# Patient Record
Sex: FEMALE | Race: WHITE | NOT HISPANIC OR LATINO | Employment: UNEMPLOYED | URBAN - METROPOLITAN AREA
[De-identification: names, ages, dates, MRNs, and addresses within clinical notes are randomized per-mention and may not be internally consistent; named-entity substitution may affect disease eponyms.]

---

## 2018-04-06 ENCOUNTER — HISTORICAL (OUTPATIENT)
Dept: ADMINISTRATIVE | Facility: HOSPITAL | Age: 23
End: 2018-04-06

## 2018-04-06 LAB
ALBUMIN SERPL-MCNC: 3.4 GM/DL (ref 3.4–5)
ALBUMIN/GLOB SERPL: 1 RATIO (ref 1–2)
ALP SERPL-CCNC: 128 UNIT/L (ref 45–117)
ALT SERPL-CCNC: 20 UNIT/L (ref 12–78)
APPEARANCE, UA: CLEAR
AST SERPL-CCNC: 16 UNIT/L (ref 15–37)
BACTERIA #/AREA URNS AUTO: ABNORMAL /[HPF]
BILIRUB SERPL-MCNC: 0.6 MG/DL (ref 0.2–1)
BILIRUB UR QL STRIP: NEGATIVE
BILIRUBIN DIRECT+TOT PNL SERPL-MCNC: 0.2 MG/DL
BILIRUBIN DIRECT+TOT PNL SERPL-MCNC: 0.4 MG/DL
BUN SERPL-MCNC: 7 MG/DL (ref 7–18)
CALCIUM SERPL-MCNC: 8.6 MG/DL (ref 8.5–10.1)
CHLORIDE SERPL-SCNC: 109 MMOL/L (ref 98–107)
CO2 SERPL-SCNC: 25 MMOL/L (ref 21–32)
COLOR UR: YELLOW
CREAT SERPL-MCNC: 0.9 MG/DL (ref 0.6–1.3)
DEPRECATED CALCIDIOL+CALCIFEROL SERPL-MC: 27.38 NG/ML (ref 30–80)
EST. AVERAGE GLUCOSE BLD GHB EST-MCNC: 103 MG/DL
GLOBULIN SER-MCNC: 4.4 GM/ML (ref 2.3–3.5)
GLUCOSE (UA): NORMAL
GLUCOSE SERPL-MCNC: 93 MG/DL (ref 74–106)
HAV IGM SERPL QL IA: NONREACTIVE
HBA1C MFR BLD: 5.2 % (ref 4.2–6.3)
HBV CORE IGM SERPL QL IA: ABNORMAL
HBV SURFACE AG SERPL QL IA: NEGATIVE
HCV AB SERPL QL IA: NONREACTIVE
HGB UR QL STRIP: NEGATIVE
HIV 1+2 AB+HIV1 P24 AG SERPL QL IA: NONREACTIVE
HYALINE CASTS #/AREA URNS LPF: ABNORMAL /[LPF]
KETONES UR QL STRIP: NEGATIVE
LEUKOCYTE ESTERASE UR QL STRIP: 25 LEU/UL
NITRITE UR QL STRIP: NEGATIVE
PH UR STRIP: 7 [PH] (ref 4.5–8)
POTASSIUM SERPL-SCNC: 4.1 MMOL/L (ref 3.5–5.1)
PROT SERPL-MCNC: 7.8 GM/DL (ref 6.4–8.2)
PROT UR QL STRIP: NEGATIVE
RBC #/AREA URNS AUTO: ABNORMAL /[HPF]
SODIUM SERPL-SCNC: 143 MMOL/L (ref 136–145)
SP GR UR STRIP: 1.01 (ref 1–1.03)
SQUAMOUS #/AREA URNS LPF: ABNORMAL /[LPF]
TSH SERPL-ACNC: 0.96 MIU/L (ref 0.36–3.74)
UROBILINOGEN UR STRIP-ACNC: NORMAL
WBC #/AREA URNS AUTO: ABNORMAL /HPF

## 2018-06-06 ENCOUNTER — HISTORICAL (OUTPATIENT)
Dept: ADMINISTRATIVE | Facility: HOSPITAL | Age: 23
End: 2018-06-06

## 2018-06-06 LAB
HBV CORE IGM SERPL QL IA: ABNORMAL
HBV SURFACE AB SER-ACNC: <3.1 MIU/ML
HBV SURFACE AB SERPL IA-ACNC: NONREACTIVE M[IU]/ML

## 2018-08-09 ENCOUNTER — HISTORICAL (OUTPATIENT)
Dept: INTERNAL MEDICINE | Facility: CLINIC | Age: 23
End: 2018-08-09

## 2018-08-09 LAB
ABS NEUT (OLG): 5.52 X10(3)/MCL (ref 2.1–9.2)
BASOPHILS # BLD AUTO: 0.08 X10(3)/MCL
BASOPHILS NFR BLD AUTO: 1 %
EOSINOPHIL # BLD AUTO: 0.26 X10(3)/MCL
EOSINOPHIL NFR BLD AUTO: 3 %
ERYTHROCYTE [DISTWIDTH] IN BLOOD BY AUTOMATED COUNT: 13.2 % (ref 11.5–14.5)
HCT VFR BLD AUTO: 46.1 % (ref 35–46)
HGB BLD-MCNC: 15.4 GM/DL (ref 12–16)
IMM GRANULOCYTES # BLD AUTO: 0.02 10*3/UL
IMM GRANULOCYTES NFR BLD AUTO: 0 %
LYMPHOCYTES # BLD AUTO: 1.8 X10(3)/MCL
LYMPHOCYTES NFR BLD AUTO: 22 % (ref 13–40)
MCH RBC QN AUTO: 28.5 PG (ref 26–34)
MCHC RBC AUTO-ENTMCNC: 33.4 GM/DL (ref 31–37)
MCV RBC AUTO: 85.4 FL (ref 80–100)
MONOCYTES # BLD AUTO: 0.46 X10(3)/MCL
MONOCYTES NFR BLD AUTO: 6 % (ref 4–12)
NEUTROPHILS # BLD AUTO: 5.52 X10(3)/MCL
NEUTROPHILS NFR BLD AUTO: 68 X10(3)/MCL
PLATELET # BLD AUTO: 382 X10(3)/MCL (ref 130–400)
PMV BLD AUTO: 10 FL (ref 7.4–10.4)
RBC # BLD AUTO: 5.4 X10(6)/MCL (ref 4–5.2)
TSH SERPL-ACNC: 1.61 MIU/L (ref 0.36–3.74)
WBC # SPEC AUTO: 8.1 X10(3)/MCL (ref 4.5–11)

## 2018-11-06 ENCOUNTER — HISTORICAL (OUTPATIENT)
Dept: RADIOLOGY | Facility: HOSPITAL | Age: 23
End: 2018-11-06

## 2019-05-24 ENCOUNTER — HISTORICAL (OUTPATIENT)
Dept: INTERNAL MEDICINE | Facility: CLINIC | Age: 24
End: 2019-05-24

## 2019-05-24 LAB
ABS NEUT (OLG): 6.34 X10(3)/MCL (ref 2.1–9.2)
ALBUMIN SERPL-MCNC: 3.4 GM/DL (ref 3.4–5)
ALBUMIN/GLOB SERPL: 0.8 RATIO (ref 1.1–2)
ALP SERPL-CCNC: 135 UNIT/L (ref 45–117)
ALT SERPL-CCNC: 25 UNIT/L (ref 12–78)
AST SERPL-CCNC: 20 UNIT/L (ref 15–37)
BASOPHILS # BLD AUTO: 0.07 X10(3)/MCL
BASOPHILS NFR BLD AUTO: 1 %
BILIRUB SERPL-MCNC: 0.4 MG/DL (ref 0.2–1)
BILIRUBIN DIRECT+TOT PNL SERPL-MCNC: 0.1 MG/DL
BILIRUBIN DIRECT+TOT PNL SERPL-MCNC: 0.3 MG/DL
BUN SERPL-MCNC: 10 MG/DL (ref 7–18)
CALCIUM SERPL-MCNC: 9.2 MG/DL (ref 8.5–10.1)
CHLORIDE SERPL-SCNC: 105 MMOL/L (ref 98–107)
CO2 SERPL-SCNC: 22 MMOL/L (ref 21–32)
CREAT SERPL-MCNC: 1 MG/DL (ref 0.6–1.3)
EOSINOPHIL # BLD AUTO: 0.3 X10(3)/MCL
EOSINOPHIL NFR BLD AUTO: 3 %
ERYTHROCYTE [DISTWIDTH] IN BLOOD BY AUTOMATED COUNT: 13.3 % (ref 11.5–14.5)
GLOBULIN SER-MCNC: 4.4 GM/ML (ref 2.3–3.5)
GLUCOSE SERPL-MCNC: 102 MG/DL (ref 74–106)
HCT VFR BLD AUTO: 43.3 % (ref 35–46)
HGB BLD-MCNC: 14.3 GM/DL (ref 12–16)
IMM GRANULOCYTES # BLD AUTO: 0.04 10*3/UL
IMM GRANULOCYTES NFR BLD AUTO: 0 %
LYMPHOCYTES # BLD AUTO: 2.43 X10(3)/MCL
LYMPHOCYTES NFR BLD AUTO: 25 % (ref 13–40)
MCH RBC QN AUTO: 27.7 PG (ref 26–34)
MCHC RBC AUTO-ENTMCNC: 33 GM/DL (ref 31–37)
MCV RBC AUTO: 83.9 FL (ref 80–100)
MONOCYTES # BLD AUTO: 0.55 X10(3)/MCL
MONOCYTES NFR BLD AUTO: 6 % (ref 4–12)
NEUTROPHILS # BLD AUTO: 6.34 X10(3)/MCL
NEUTROPHILS NFR BLD AUTO: 65 X10(3)/MCL
PLATELET # BLD AUTO: 387 X10(3)/MCL (ref 130–400)
PMV BLD AUTO: 9.9 FL (ref 7.4–10.4)
POTASSIUM SERPL-SCNC: 4 MMOL/L (ref 3.5–5.1)
PROT SERPL-MCNC: 7.8 GM/DL (ref 6.4–8.2)
RBC # BLD AUTO: 5.16 X10(6)/MCL (ref 4–5.2)
SODIUM SERPL-SCNC: 136 MMOL/L (ref 136–145)
WBC # SPEC AUTO: 9.7 X10(3)/MCL (ref 4.5–11)

## 2020-09-22 ENCOUNTER — HISTORICAL (OUTPATIENT)
Dept: INTERNAL MEDICINE | Facility: CLINIC | Age: 25
End: 2020-09-22

## 2020-09-22 LAB
ABS NEUT (OLG): 6.16 X10(3)/MCL (ref 2.1–9.2)
ALBUMIN SERPL-MCNC: 3.9 GM/DL (ref 3.4–5)
ALBUMIN/GLOB SERPL: 0.9 RATIO (ref 1.1–2)
ALP SERPL-CCNC: 114 UNIT/L (ref 45–117)
ALT SERPL-CCNC: 16 UNIT/L (ref 12–78)
AST SERPL-CCNC: 18 UNIT/L (ref 15–37)
BASOPHILS # BLD AUTO: 0.1 X10(3)/MCL (ref 0–0.2)
BASOPHILS NFR BLD AUTO: 1 %
BILIRUB SERPL-MCNC: 0.5 MG/DL (ref 0.2–1)
BILIRUBIN DIRECT+TOT PNL SERPL-MCNC: 0.2 MG/DL (ref 0–0.2)
BILIRUBIN DIRECT+TOT PNL SERPL-MCNC: 0.3 MG/DL
BUN SERPL-MCNC: 9 MG/DL (ref 7–18)
CALCIUM SERPL-MCNC: 9 MG/DL (ref 8.5–10.1)
CHLORIDE SERPL-SCNC: 107 MMOL/L (ref 98–107)
CHOLEST SERPL-MCNC: 213 MG/DL
CHOLEST/HDLC SERPL: 5.5 {RATIO} (ref 0–4.4)
CO2 SERPL-SCNC: 18 MMOL/L (ref 21–32)
CREAT SERPL-MCNC: 1 MG/DL (ref 0.6–1.3)
DEPRECATED CALCIDIOL+CALCIFEROL SERPL-MC: 50.9 NG/ML (ref 30–80)
EOSINOPHIL # BLD AUTO: 0.2 X10(3)/MCL (ref 0–0.9)
EOSINOPHIL NFR BLD AUTO: 2 %
ERYTHROCYTE [DISTWIDTH] IN BLOOD BY AUTOMATED COUNT: 14.5 % (ref 11.5–14.5)
EST. AVERAGE GLUCOSE BLD GHB EST-MCNC: 108 MG/DL
GLOBULIN SER-MCNC: 4.5 GM/ML (ref 2.3–3.5)
GLUCOSE SERPL-MCNC: 94 MG/DL (ref 74–106)
HAV IGM SERPL QL IA: NONREACTIVE
HBA1C MFR BLD: 5.4 % (ref 4.2–6.3)
HBV CORE IGM SERPL QL IA: NONREACTIVE
HBV SURFACE AG SERPL QL IA: NONREACTIVE
HCT VFR BLD AUTO: 44.5 % (ref 35–46)
HCV AB SERPL QL IA: NONREACTIVE
HDLC SERPL-MCNC: 39 MG/DL (ref 40–59)
HGB BLD-MCNC: 14.6 GM/DL (ref 12–16)
HIV 1+2 AB+HIV1 P24 AG SERPL QL IA: NONREACTIVE
IMM GRANULOCYTES # BLD AUTO: 0.03 10*3/UL
IMM GRANULOCYTES NFR BLD AUTO: 0 %
LDLC SERPL CALC-MCNC: 134 MG/DL
LYMPHOCYTES # BLD AUTO: 2.2 X10(3)/MCL (ref 0.6–4.6)
LYMPHOCYTES NFR BLD AUTO: 24 %
MCH RBC QN AUTO: 27 PG (ref 26–34)
MCHC RBC AUTO-ENTMCNC: 32.8 GM/DL (ref 31–37)
MCV RBC AUTO: 82.4 FL (ref 80–100)
MONOCYTES # BLD AUTO: 0.5 X10(3)/MCL (ref 0.1–1.3)
MONOCYTES NFR BLD AUTO: 6 %
NEUTROPHILS # BLD AUTO: 6.16 X10(3)/MCL (ref 2.1–9.2)
NEUTROPHILS NFR BLD AUTO: 68 %
PLATELET # BLD AUTO: 467 X10(3)/MCL (ref 130–400)
PMV BLD AUTO: 9.6 FL (ref 7.4–10.4)
POTASSIUM SERPL-SCNC: 4.3 MMOL/L (ref 3.5–5.1)
PROT SERPL-MCNC: 8.4 GM/DL (ref 6.4–8.2)
RBC # BLD AUTO: 5.4 X10(6)/MCL (ref 4–5.2)
SODIUM SERPL-SCNC: 137 MMOL/L (ref 136–145)
T PALLIDUM AB SER QL: NONREACTIVE
TRIGL SERPL-MCNC: 202 MG/DL
TSH SERPL-ACNC: 2.19 MIU/L (ref 0.36–3.74)
VLDLC SERPL CALC-MCNC: 40 MG/DL
WBC # SPEC AUTO: 9.1 X10(3)/MCL (ref 4.5–11)

## 2021-06-30 LAB
CHLAMYDIA TRACHOMATIS (PRECISION): NEGATIVE
NEISSERIA, GONORRHOAEA DNA, SDA: NEGATIVE
PAP RECOMMENDATION EXT: NORMAL
PAP SMEAR: NORMAL
TRICHOMONAS VAGINALIS: NEGATIVE

## 2021-10-20 ENCOUNTER — HISTORICAL (OUTPATIENT)
Dept: INTERNAL MEDICINE | Facility: CLINIC | Age: 26
End: 2021-10-20

## 2021-10-20 LAB
ABS NEUT (OLG): 4.93 X10(3)/MCL (ref 2.1–9.2)
ALBUMIN SERPL-MCNC: 3.5 GM/DL (ref 3.5–5)
ALBUMIN/GLOB SERPL: 1.1 RATIO (ref 1.1–2)
ALP SERPL-CCNC: 92 UNIT/L (ref 40–150)
ALT SERPL-CCNC: 9 UNIT/L (ref 0–55)
AST SERPL-CCNC: 15 UNIT/L (ref 5–34)
BASOPHILS # BLD AUTO: 0.1 X10(3)/MCL (ref 0–0.2)
BASOPHILS NFR BLD AUTO: 1 %
BILIRUB SERPL-MCNC: 0.2 MG/DL
BILIRUBIN DIRECT+TOT PNL SERPL-MCNC: 0.1 MG/DL (ref 0–0.5)
BILIRUBIN DIRECT+TOT PNL SERPL-MCNC: 0.1 MG/DL (ref 0–0.8)
BUN SERPL-MCNC: 9.7 MG/DL (ref 7–18.7)
CALCIUM SERPL-MCNC: 9.7 MG/DL (ref 8.4–10.2)
CHLORIDE SERPL-SCNC: 108 MMOL/L (ref 98–107)
CHOLEST SERPL-MCNC: 181 MG/DL
CHOLEST/HDLC SERPL: 6 {RATIO} (ref 0–5)
CO2 SERPL-SCNC: 20 MMOL/L (ref 22–29)
CREAT SERPL-MCNC: 0.99 MG/DL (ref 0.55–1.02)
DEPRECATED CALCIDIOL+CALCIFEROL SERPL-MC: 37.1 NG/ML (ref 30–80)
EOSINOPHIL # BLD AUTO: 0.2 X10(3)/MCL (ref 0–0.9)
EOSINOPHIL NFR BLD AUTO: 2 %
ERYTHROCYTE [DISTWIDTH] IN BLOOD BY AUTOMATED COUNT: 14 % (ref 11.5–14.5)
EST. AVERAGE GLUCOSE BLD GHB EST-MCNC: 99.7 MG/DL
GLOBULIN SER-MCNC: 3.3 GM/DL (ref 2.4–3.5)
GLUCOSE SERPL-MCNC: 99 MG/DL (ref 74–100)
HAV IGM SERPL QL IA: NONREACTIVE
HBA1C MFR BLD: 5.1 %
HBV CORE IGM SERPL QL IA: NONREACTIVE
HBV SURFACE AG SERPL QL IA: NONREACTIVE
HCT VFR BLD AUTO: 41.4 % (ref 35–46)
HCV AB SERPL QL IA: NONREACTIVE
HDLC SERPL-MCNC: 29 MG/DL (ref 35–60)
HGB BLD-MCNC: 14.1 GM/DL (ref 12–16)
HIV 1+2 AB+HIV1 P24 AG SERPL QL IA: NONREACTIVE
IMM GRANULOCYTES # BLD AUTO: 0.03 10*3/UL
IMM GRANULOCYTES NFR BLD AUTO: 0 %
LDLC SERPL CALC-MCNC: 82 MG/DL (ref 50–140)
LYMPHOCYTES # BLD AUTO: 3.3 X10(3)/MCL (ref 0.6–4.6)
LYMPHOCYTES NFR BLD AUTO: 36 %
MCH RBC QN AUTO: 27.3 PG (ref 26–34)
MCHC RBC AUTO-ENTMCNC: 34.1 GM/DL (ref 31–37)
MCV RBC AUTO: 80.2 FL (ref 80–100)
MONOCYTES # BLD AUTO: 0.6 X10(3)/MCL (ref 0.1–1.3)
MONOCYTES NFR BLD AUTO: 6 %
NEUTROPHILS # BLD AUTO: 4.93 X10(3)/MCL (ref 2.1–9.2)
NEUTROPHILS NFR BLD AUTO: 54 %
NRBC BLD AUTO-RTO: 0 % (ref 0–0.2)
PLATELET # BLD AUTO: 339 X10(3)/MCL (ref 130–400)
PMV BLD AUTO: 9.2 FL (ref 7.4–10.4)
POTASSIUM SERPL-SCNC: 3.6 MMOL/L (ref 3.5–5.1)
PROT SERPL-MCNC: 6.8 GM/DL (ref 6.4–8.3)
RBC # BLD AUTO: 5.16 X10(6)/MCL (ref 4–5.2)
SODIUM SERPL-SCNC: 136 MMOL/L (ref 136–145)
T PALLIDUM AB SER QL: NONREACTIVE
T4 FREE SERPL-MCNC: 0.78 NG/DL (ref 0.7–1.48)
TRIGL SERPL-MCNC: 351 MG/DL (ref 37–140)
TSH SERPL-ACNC: 1.31 UIU/ML (ref 0.35–4.94)
VLDLC SERPL CALC-MCNC: 70 MG/DL
WBC # SPEC AUTO: 9.1 X10(3)/MCL (ref 4.5–11)

## 2021-12-13 ENCOUNTER — HISTORICAL (OUTPATIENT)
Dept: ADMINISTRATIVE | Facility: HOSPITAL | Age: 26
End: 2021-12-13

## 2021-12-13 LAB
AMPHET UR QL SCN: NEGATIVE
BARBITURATE SCN PRESENT UR: NEGATIVE
BENZODIAZ UR QL SCN: NEGATIVE
CANNABINOIDS UR QL SCN: POSITIVE
COCAINE UR QL SCN: NEGATIVE
FENTANYL UR QL SCN: NEGATIVE
MDMA UR QL SCN: NEGATIVE
OPIATES UR QL SCN: NEGATIVE
PCP UR QL: NEGATIVE
PH UR STRIP.AUTO: 7.5 [PH] (ref 3–11)

## 2021-12-27 ENCOUNTER — HISTORICAL (OUTPATIENT)
Dept: RADIOLOGY | Facility: HOSPITAL | Age: 26
End: 2021-12-27

## 2022-01-19 ENCOUNTER — HISTORICAL (OUTPATIENT)
Dept: CARDIOLOGY | Facility: HOSPITAL | Age: 27
End: 2022-01-19

## 2022-02-10 ENCOUNTER — HISTORICAL (OUTPATIENT)
Dept: RADIOLOGY | Facility: HOSPITAL | Age: 27
End: 2022-02-10

## 2022-02-10 ENCOUNTER — HISTORICAL (OUTPATIENT)
Dept: ADMINISTRATIVE | Facility: HOSPITAL | Age: 27
End: 2022-02-10

## 2022-03-15 ENCOUNTER — HISTORICAL (OUTPATIENT)
Dept: RADIOLOGY | Facility: HOSPITAL | Age: 27
End: 2022-03-15

## 2022-03-15 ENCOUNTER — HISTORICAL (OUTPATIENT)
Dept: ADMINISTRATIVE | Facility: HOSPITAL | Age: 27
End: 2022-03-15

## 2022-04-11 ENCOUNTER — HISTORICAL (OUTPATIENT)
Dept: ADMINISTRATIVE | Facility: HOSPITAL | Age: 27
End: 2022-04-11
Payer: MEDICAID

## 2022-04-24 VITALS
WEIGHT: 245.38 LBS | OXYGEN SATURATION: 98 % | SYSTOLIC BLOOD PRESSURE: 117 MMHG | BODY MASS INDEX: 39.43 KG/M2 | DIASTOLIC BLOOD PRESSURE: 81 MMHG | HEIGHT: 66 IN

## 2022-06-09 PROBLEM — F41.1 ANXIETY STATE: Status: ACTIVE | Noted: 2022-06-09

## 2022-06-09 PROBLEM — E66.9 OBESITY: Status: ACTIVE | Noted: 2022-06-09

## 2022-06-09 PROBLEM — G43.909 MIGRAINE HEADACHE: Status: ACTIVE | Noted: 2022-06-09

## 2022-06-09 PROBLEM — G47.00 INSOMNIA: Status: ACTIVE | Noted: 2022-06-09

## 2022-06-09 PROBLEM — F32.A DEPRESSIVE DISORDER: Status: ACTIVE | Noted: 2022-06-09

## 2022-06-09 PROBLEM — H93.19 TINNITUS: Status: ACTIVE | Noted: 2022-06-09

## 2022-06-09 PROBLEM — R61 HYPERHIDROSIS: Status: ACTIVE | Noted: 2022-06-09

## 2022-06-09 PROBLEM — E55.9 VITAMIN D DEFICIENCY: Status: ACTIVE | Noted: 2022-06-09

## 2022-06-09 NOTE — PROGRESS NOTES
[facility], formerly [facility] Neurology follow-up Office Visit Note    Initial Visit Date:  March 8, 2022  Last Visit Date:  March 8, 2022  Current Visit Date:  06/10/2022    Chief Complaint:     Chief Complaint   Patient presents with    Migraine     States headaches are getting worst       History of Present Illness:      This is 27 y.o. female with history of Anxiety, Depression, Hyperhidrosis, Eating disorder, substance abuse, Tachycardia who is referred for dizziness, tachycardia, and worsening headache disorder due to polypharmacy. Also has over sleeping issue, which leads to worsening daytime fatigue.  During last visit, patient was advised that the concurrent use of prazosin, trazodone, Klonopin, hydroxyzine, glycopyrrolate, meclizine, Effexor can lead to tachycardia, dizziness, orthostatic hypotension, xerostomia, hyperhidrosis, and nightmares.  She had since then attempted to wean herself off of Effexor despite being told to discuss with her psychiatrist regarding medication adjustments.  She had since then been seen at the emergency room for SNRI withdrawal.  Also notes that she is taking cannabis on top of all her medications. Have just Trintellix.    Headache Frequency: 8 migraine headache days per month with daily headache.     Presenting History   Of note, patient was started on Glycopyrrolate in 11/2021 while on Hydroxyzine, Prazosin, Trazodone, Klonopin, Effexor. She noted that around 12/2021, she had one episode whereby she felt dizzy, palpitation, lightheaded, followed by LOC. She noted that since then, she would experience out of body sensation, dizziness, and palpitations during activities. She also noted worsening headaches for the past few months. She reports chronic trouble staying asleep and waking up tired. She would go to bed at around 9 - 10 pm and wake up at around 8 AM in the morning. She also notes hyperhydrosis that has been consistently getting worse in recent years, requiring her to start  glyocopyrrolate.      Medications:     Current Outpatient Medications on File Prior to Visit   Medication Sig Dispense Refill    clonazePAM (KLONOPIN) 0.5 MG tablet Take 0.5 mg by mouth 2 (two) times daily.      fexofenadine-pseudoephedrine (ALLEGRA-D 24) 180-240 mg per 24 hr tablet fexofenadine-pseudoephedrine  mg-240 mg tablet,ext.release 24 hr   Take 1 tablet every day by oral route.      L norgest/e.estradioL-e.estrad (SEASONIQUE) 0.15 mg-30 mcg (84)/10 mcg (7) 3MPk Ashlyna 0.15 mg-30 mcg (84)/10 mcg(7) tablets,3 month dose pack   Take 1 tablet every day by oral route.      OXcarbazepine (TRILEPTAL) 300 MG Tab Take 300 mg by mouth 2 (two) times daily.      pantoprazole (PROTONIX) 40 MG tablet pantoprazole 40 mg tablet,delayed release      traZODone (DESYREL) 50 MG tablet Take  mg by mouth nightly as needed.      glycopyrrolate (ROBINUL) 1 mg Tab Take 1 mg by mouth once daily.       No current facility-administered medications on file prior to visit.       Labs:     Results for orders placed or performed in visit on 12/13/21   Drug Screen, Urine   Result Value Ref Range    pH, UA 7.5 3.0 - 11.0    Fentanyl, Urine Negative >Negative    Cocaine, Urine Negative >Negative    Cannabinoids, Urine Positive (A) >Negative    Barbituates, Urine Negative >Negative    Amphetamines, Urine Negative >Negative    Benzodiazepine, Urine Negative >Negative    Opiates, Urine Negative >Negative    Phencyclidine, Urine Negative >Negative    MDMA, Urine Negative >Negative       Studies:       - routine EEG 1/19/2022: This is a normal routine awake EEG.  - Holter 24 hrs - 42% HR in 100s with premature beats.    Review of Systems:     Review of Systems   All other systems reviewed and are negative.      Physical Exams:     Vitals:    06/10/22 0940   BP: 110/77   Pulse: 63   Resp: 18   Temp: 99 °F (37.2 °C)       Physical Exam  Vitals and nursing note reviewed.   Constitutional:       Appearance: Normal appearance.    HENT:      Head: Normocephalic and atraumatic.      Nose: Nose normal.      Mouth/Throat:      Mouth: Mucous membranes are moist.      Pharynx: Oropharynx is clear.   Eyes:      Conjunctiva/sclera: Conjunctivae normal.   Cardiovascular:      Rate and Rhythm: Normal rate and regular rhythm.      Pulses: Normal pulses.   Pulmonary:      Effort: Pulmonary effort is normal.      Breath sounds: Normal breath sounds.   Abdominal:      General: Abdomen is flat.   Musculoskeletal:         General: Normal range of motion.      Cervical back: Normal range of motion.   Skin:     General: Skin is warm.   Neurological:      Mental Status: She is alert.         Comprehensive Neurological Exam:  Mental Status: Alert Oriented to Self, Date, and Place. Naming, repetition, reading, and writing wnl. Comprehension wnl. No dysarthria.   CN II - XII: EMMA, No APD, Fundus wnl OU, VA grossly intact to finger counting at 6 ft, VFFC, No ptosis OU, EOMI without nystagmus, LT/Temp symmetric in CN V1-3 distribution, Hearing grossly intact, Face Symmetric, Tongue and Uvula midline, Trapezius symmetric bilateral.   Motor: tone and bulk wnl throughout, no abnormal involuntary or voluntary movements, 5/5 to confrontation, Fine finger movements wnl b/l, No pronator drift.   Sensory: LT, Proprioception, Vibration, PP, Temp symmetric. No sensory simultagnosia.   Reflexes: 2+ throughout, plantar reflexes downward bilateral.   Cerebellar: FNF wnl b/l, RAHM wnl b/l  Romberg: Negative  Modified Tandem Romberg: wnl  Gait: normal.      Assessment:     This is 27 y.o. female with history of Anxiety, Depression, Hyperhidrosis, Eating disorder, substance abuse, Tachycardia who is referred for dizziness, tachycardia, and worsening headache disorder due to polypharmacy. Also has over sleeping issue, which leads to worsening daytime fatigue.     Problem List Items Addressed This Visit        Neuro    Migraine headache - Primary       Other    Hyperhidrosis       Other Visit Diagnoses     Polypharmacy              Plan:     [] start Propranolol 20 mg BID   [] start Promethazine 25 mg BID PRN   [] start Maxalt 5 mg BID PRN     RTC 3 months     I have explained the treatment plan, diagnosis, and prognosis to patient. All questions are answered to the best of my knowledge.     Face to face time 30 minutes, including documentation, chart review, counseling, education, review of test results, relevant medical records, and coordination of care.     I have explained the treatment plan, diagnosis, and prognosis to patient. All questions are answered to the best of my knowledge.       Daly Colon MD   General Neurology  06/10/2022

## 2022-06-10 ENCOUNTER — OFFICE VISIT (OUTPATIENT)
Dept: NEUROLOGY | Facility: CLINIC | Age: 27
End: 2022-06-10
Payer: MEDICAID

## 2022-06-10 VITALS
OXYGEN SATURATION: 98 % | SYSTOLIC BLOOD PRESSURE: 110 MMHG | HEIGHT: 66 IN | TEMPERATURE: 99 F | HEART RATE: 63 BPM | RESPIRATION RATE: 18 BRPM | BODY MASS INDEX: 38.72 KG/M2 | WEIGHT: 240.94 LBS | DIASTOLIC BLOOD PRESSURE: 77 MMHG

## 2022-06-10 DIAGNOSIS — R61 HYPERHIDROSIS: ICD-10-CM

## 2022-06-10 DIAGNOSIS — F41.1 ANXIETY STATE: ICD-10-CM

## 2022-06-10 DIAGNOSIS — Z79.899 POLYPHARMACY: ICD-10-CM

## 2022-06-10 DIAGNOSIS — F32.A DEPRESSIVE DISORDER: ICD-10-CM

## 2022-06-10 DIAGNOSIS — G43.709 CHRONIC MIGRAINE WITHOUT AURA WITHOUT STATUS MIGRAINOSUS, NOT INTRACTABLE: Primary | ICD-10-CM

## 2022-06-10 PROCEDURE — 1159F PR MEDICATION LIST DOCUMENTED IN MEDICAL RECORD: ICD-10-PCS | Mod: CPTII,,, | Performed by: PSYCHIATRY & NEUROLOGY

## 2022-06-10 PROCEDURE — 3078F DIAST BP <80 MM HG: CPT | Mod: CPTII,,, | Performed by: PSYCHIATRY & NEUROLOGY

## 2022-06-10 PROCEDURE — 99214 OFFICE O/P EST MOD 30 MIN: CPT | Mod: S$PBB,,, | Performed by: PSYCHIATRY & NEUROLOGY

## 2022-06-10 PROCEDURE — 3008F PR BODY MASS INDEX (BMI) DOCUMENTED: ICD-10-PCS | Mod: CPTII,,, | Performed by: PSYCHIATRY & NEUROLOGY

## 2022-06-10 PROCEDURE — 1159F MED LIST DOCD IN RCRD: CPT | Mod: CPTII,,, | Performed by: PSYCHIATRY & NEUROLOGY

## 2022-06-10 PROCEDURE — 3008F BODY MASS INDEX DOCD: CPT | Mod: CPTII,,, | Performed by: PSYCHIATRY & NEUROLOGY

## 2022-06-10 PROCEDURE — 99213 OFFICE O/P EST LOW 20 MIN: CPT | Mod: PBBFAC | Performed by: PSYCHIATRY & NEUROLOGY

## 2022-06-10 PROCEDURE — 99214 PR OFFICE/OUTPT VISIT, EST, LEVL IV, 30-39 MIN: ICD-10-PCS | Mod: S$PBB,,, | Performed by: PSYCHIATRY & NEUROLOGY

## 2022-06-10 PROCEDURE — 3078F PR MOST RECENT DIASTOLIC BLOOD PRESSURE < 80 MM HG: ICD-10-PCS | Mod: CPTII,,, | Performed by: PSYCHIATRY & NEUROLOGY

## 2022-06-10 PROCEDURE — 3074F SYST BP LT 130 MM HG: CPT | Mod: CPTII,,, | Performed by: PSYCHIATRY & NEUROLOGY

## 2022-06-10 PROCEDURE — 3074F PR MOST RECENT SYSTOLIC BLOOD PRESSURE < 130 MM HG: ICD-10-PCS | Mod: CPTII,,, | Performed by: PSYCHIATRY & NEUROLOGY

## 2022-06-10 RX ORDER — PANTOPRAZOLE SODIUM 40 MG/1
TABLET, DELAYED RELEASE ORAL
COMMUNITY
Start: 2021-10-07 | End: 2022-06-16 | Stop reason: SDUPTHER

## 2022-06-10 RX ORDER — TRAZODONE HYDROCHLORIDE 50 MG/1
50-100 TABLET ORAL NIGHTLY PRN
COMMUNITY
Start: 2022-02-03 | End: 2022-06-16 | Stop reason: SDUPTHER

## 2022-06-10 RX ORDER — PROMETHAZINE HYDROCHLORIDE 12.5 MG/1
12.5 TABLET ORAL EVERY 6 HOURS PRN
Qty: 90 TABLET | Refills: 1 | Status: SHIPPED | OUTPATIENT
Start: 2022-06-10 | End: 2022-06-10

## 2022-06-10 RX ORDER — PROPRANOLOL HYDROCHLORIDE 20 MG/1
20 TABLET ORAL 2 TIMES DAILY
Qty: 60 TABLET | Refills: 4 | Status: SHIPPED | OUTPATIENT
Start: 2022-06-10 | End: 2022-09-13

## 2022-06-10 RX ORDER — RIZATRIPTAN BENZOATE 5 MG/1
5 TABLET ORAL
Qty: 9 TABLET | Refills: 4 | Status: SHIPPED | OUTPATIENT
Start: 2022-06-10 | End: 2022-09-13

## 2022-06-10 RX ORDER — CLONAZEPAM 0.5 MG/1
0.5 TABLET ORAL 2 TIMES DAILY
COMMUNITY
Start: 2022-05-16 | End: 2022-06-16 | Stop reason: SDUPTHER

## 2022-06-10 RX ORDER — GLYCOPYRROLATE 1 MG/1
1 TABLET ORAL DAILY
COMMUNITY
End: 2023-12-19

## 2022-06-10 RX ORDER — LEVONORGESTREL / ETHINYL ESTRADIOL AND ETHINYL ESTRADIOL 150-30(84)
KIT ORAL
COMMUNITY
End: 2022-12-02

## 2022-06-10 RX ORDER — FEXOFENADINE HCL AND PSEUDOEPHEDRINE HCI 180; 240 MG/1; MG/1
TABLET, EXTENDED RELEASE ORAL
COMMUNITY
End: 2022-12-02

## 2022-06-10 RX ORDER — PROMETHAZINE HYDROCHLORIDE 12.5 MG/1
12.5 TABLET ORAL EVERY 4 HOURS PRN
Qty: 90 TABLET | Refills: 4 | Status: SHIPPED | OUTPATIENT
Start: 2022-06-10 | End: 2022-08-09

## 2022-06-10 RX ORDER — OXCARBAZEPINE 300 MG/1
300 TABLET, FILM COATED ORAL 2 TIMES DAILY
COMMUNITY
Start: 2022-05-05 | End: 2022-06-16 | Stop reason: SDUPTHER

## 2022-06-16 ENCOUNTER — TELEPHONE (OUTPATIENT)
Dept: INTERNAL MEDICINE | Facility: CLINIC | Age: 27
End: 2022-06-16

## 2022-06-16 ENCOUNTER — OFFICE VISIT (OUTPATIENT)
Dept: INTERNAL MEDICINE | Facility: CLINIC | Age: 27
End: 2022-06-16
Payer: MEDICAID

## 2022-06-16 VITALS
TEMPERATURE: 99 F | DIASTOLIC BLOOD PRESSURE: 74 MMHG | HEIGHT: 66 IN | SYSTOLIC BLOOD PRESSURE: 114 MMHG | WEIGHT: 234.38 LBS | BODY MASS INDEX: 37.67 KG/M2 | RESPIRATION RATE: 20 BRPM | HEART RATE: 83 BPM

## 2022-06-16 DIAGNOSIS — F32.A DEPRESSIVE DISORDER: Primary | ICD-10-CM

## 2022-06-16 DIAGNOSIS — R00.2 HEART PALPITATIONS: ICD-10-CM

## 2022-06-16 DIAGNOSIS — R55 SYNCOPE, UNSPECIFIED SYNCOPE TYPE: ICD-10-CM

## 2022-06-16 DIAGNOSIS — F41.1 ANXIETY STATE: ICD-10-CM

## 2022-06-16 DIAGNOSIS — G47.00 INSOMNIA, UNSPECIFIED TYPE: ICD-10-CM

## 2022-06-16 PROBLEM — J30.9 ALLERGIC RHINITIS: Status: ACTIVE | Noted: 2022-06-16

## 2022-06-16 PROBLEM — K21.9 GASTROESOPHAGEAL REFLUX DISEASE: Status: ACTIVE | Noted: 2022-06-16

## 2022-06-16 PROBLEM — E78.00 ELEVATED LOW DENSITY LIPOPROTEIN (LDL) CHOLESTEROL LEVEL: Status: ACTIVE | Noted: 2022-06-16

## 2022-06-16 PROCEDURE — 3008F PR BODY MASS INDEX (BMI) DOCUMENTED: ICD-10-PCS | Mod: CPTII,,, | Performed by: NURSE PRACTITIONER

## 2022-06-16 PROCEDURE — 1159F MED LIST DOCD IN RCRD: CPT | Mod: CPTII,,, | Performed by: NURSE PRACTITIONER

## 2022-06-16 PROCEDURE — 3008F BODY MASS INDEX DOCD: CPT | Mod: CPTII,,, | Performed by: NURSE PRACTITIONER

## 2022-06-16 PROCEDURE — 1159F PR MEDICATION LIST DOCUMENTED IN MEDICAL RECORD: ICD-10-PCS | Mod: CPTII,,, | Performed by: NURSE PRACTITIONER

## 2022-06-16 PROCEDURE — 99213 PR OFFICE/OUTPT VISIT, EST, LEVL III, 20-29 MIN: ICD-10-PCS | Mod: S$PBB,,, | Performed by: NURSE PRACTITIONER

## 2022-06-16 PROCEDURE — 3074F SYST BP LT 130 MM HG: CPT | Mod: CPTII,,, | Performed by: NURSE PRACTITIONER

## 2022-06-16 PROCEDURE — 3078F PR MOST RECENT DIASTOLIC BLOOD PRESSURE < 80 MM HG: ICD-10-PCS | Mod: CPTII,,, | Performed by: NURSE PRACTITIONER

## 2022-06-16 PROCEDURE — 99213 OFFICE O/P EST LOW 20 MIN: CPT | Mod: S$PBB,,, | Performed by: NURSE PRACTITIONER

## 2022-06-16 PROCEDURE — 3074F PR MOST RECENT SYSTOLIC BLOOD PRESSURE < 130 MM HG: ICD-10-PCS | Mod: CPTII,,, | Performed by: NURSE PRACTITIONER

## 2022-06-16 PROCEDURE — 3078F DIAST BP <80 MM HG: CPT | Mod: CPTII,,, | Performed by: NURSE PRACTITIONER

## 2022-06-16 PROCEDURE — 99214 OFFICE O/P EST MOD 30 MIN: CPT | Mod: PBBFAC | Performed by: NURSE PRACTITIONER

## 2022-06-16 RX ORDER — OXCARBAZEPINE 300 MG/1
300 TABLET, FILM COATED ORAL 2 TIMES DAILY
Qty: 60 TABLET | Refills: 0 | Status: SHIPPED | OUTPATIENT
Start: 2022-06-16 | End: 2022-07-01 | Stop reason: SDUPTHER

## 2022-06-16 RX ORDER — PANTOPRAZOLE SODIUM 40 MG/1
40 TABLET, DELAYED RELEASE ORAL DAILY
Qty: 90 TABLET | Refills: 3 | Status: SHIPPED | OUTPATIENT
Start: 2022-06-16 | End: 2022-09-13 | Stop reason: SDUPTHER

## 2022-06-16 RX ORDER — TRAZODONE HYDROCHLORIDE 50 MG/1
50 TABLET ORAL NIGHTLY PRN
Qty: 30 TABLET | Refills: 0 | Status: SHIPPED | OUTPATIENT
Start: 2022-06-16 | End: 2022-07-01 | Stop reason: SDUPTHER

## 2022-06-16 RX ORDER — CLONAZEPAM 0.5 MG/1
0.5 TABLET ORAL 2 TIMES DAILY
Qty: 60 TABLET | Refills: 0 | Status: SHIPPED | OUTPATIENT
Start: 2022-06-16 | End: 2022-07-01 | Stop reason: SDUPTHER

## 2022-06-16 NOTE — ASSESSMENT & PLAN NOTE
Chronic anxiety/ depression. She was previously seen by Dr. Carrillo and Shad Grullon, NP at [facility] however she states she has had issues receiving prescriptions and communication with providers/ clinics. She is requesting new provider. She needs refill of Trilleptal (1 day remaining of current script) and unable to receive from prior psychiatrist/ mental health provider. She was on samples of Trintellix per patient however unable to confirm this and she states her insurance was not covering it/ needed a PA?   Referral to Dr. Damian Ward and contact information provided to patient to follow up on appointment status. Any worsening s/s, notify provider.

## 2022-06-16 NOTE — ASSESSMENT & PLAN NOTE
H/o heart palpitations, syncope/ dizziness. See EKG/ Holter/Echo results.   Referred to Cardiology January 2022- no appt received ?   On propranolol per Neurologist pt reports

## 2022-06-16 NOTE — ASSESSMENT & PLAN NOTE
Chronic anxiety/ depression. She was previously seen by Dr. Carrillo and Shad Grullon, NP at [facility] however she states she has had issues receiving prescriptions and communication with providers/ clinics. She is requesting new provider. She needs refill of Klonopin (1 day remaining of current script) and unable to receive from prior psychiatrist/ mental health provider. Referral to Dr. Damian Ward and contact information provided to patient to follow up on appointment status. Any worsening s/s, notify provider.

## 2022-06-16 NOTE — TELEPHONE ENCOUNTER
Spoke with Regla in Cardio clinic. She scheduled pt for 6/21/22 @8:00am. Notified pt of time and date of appt.

## 2022-06-16 NOTE — PROGRESS NOTES
Ariela L Neyda, NP   [facility]  [address] [address]      PATIENT NAME: Salma Montero  : 1995  DATE: 22  MRN: [id number]      Billing Provider: Ariela Faye NP  Level of Service:   Patient PCP Information     Provider PCP Type    Ariela Faye NP General          Reason for Visit / Chief Complaint: Medication Refill       History of Present Illness / Problem Focused Workflow     Salma Montero presents to the clinic with Medication Refill     She presents today for medication refills. She reports she had withdrawals from weaning off of her antipsychotic medications. She reports having trouble getting medication refills from provider Dr. Carrillo and [facility]. She states she has been calling and waiting for a response for refills but has not received anything back. She has 1 day remaining of Trilleptal and Klonopin and unsure what will happen if she runs out of medication. She is anxious and tearful today. She is requesting to see new provider/ psychiatrist. She has not been happy with care previously received from other provider. She denies any other concerns/ complaints.       Review of Systems     Review of Systems   Constitutional: Negative.    HENT: Negative.    Eyes: Negative.    Respiratory: Negative.    Cardiovascular: Negative.    Gastrointestinal: Negative.    Endocrine: Negative.    Genitourinary: Negative.    Musculoskeletal: Negative.    Skin: Negative.    Allergic/Immunologic: Negative.    Neurological: Negative.    Hematological: Negative.    Psychiatric/Behavioral: Negative.        Medical / Social / Family History     Past Medical History:   Diagnosis Date    Headache     Obesity        Past Surgical History:   Procedure Laterality Date    NISSEN FUNDOPLICATION      THROAT SURGERY      TONSILLECTOMY AND ADENOIDECTOMY         Social History  Ms. Marsh  reports that  she has never smoked. She has never used smokeless tobacco. She reports current alcohol use of about 2.0 standard drinks of alcohol per week. She reports that she does not use drugs.    Family History  Ms. Marsh's family history includes Bipolar disorder in her maternal grandmother; Cancer in her maternal grandmother; Diabetes in her maternal grandfather; Heart failure in her father and maternal grandfather.    Medications and Allergies     Medications  Medication List with Changes/Refills   Current Medications    FEXOFENADINE-PSEUDOEPHEDRINE (ALLEGRA-D 24) 180-240 MG PER 24 HR TABLET    fexofenadine-pseudoephedrine  mg-240 mg tablet,ext.release 24 hr   Take 1 tablet every day by oral route.    GLYCOPYRROLATE (ROBINUL) 1 MG TAB    Take 1 mg by mouth once daily.    L NORGEST/E.ESTRADIOL-E.ESTRAD (SEASONIQUE) 0.15 MG-30 MCG (84)/10 MCG (7) 3MPK    Ashlyna 0.15 mg-30 mcg (84)/10 mcg(7) tablets,3 month dose pack   Take 1 tablet every day by oral route.    PROMETHAZINE (PHENERGAN) 12.5 MG TAB    Take 1 tablet (12.5 mg total) by mouth every 4 (four) hours as needed (nausea).    PROPRANOLOL (INDERAL) 20 MG TABLET    Take 1 tablet (20 mg total) by mouth 2 (two) times daily.    RIZATRIPTAN (MAXALT) 5 MG TABLET    Take 1 tablet (5 mg total) by mouth as needed for Migraine (every 2 hour as needed with 2 tablet in 24 hours).   Changed and/or Refilled Medications    Modified Medication Previous Medication    CLONAZEPAM (KLONOPIN) 0.5 MG TABLET clonazePAM (KLONOPIN) 0.5 MG tablet       Take 1 tablet (0.5 mg total) by mouth 2 (two) times daily.    Take 0.5 mg by mouth 2 (two) times daily.    OXCARBAZEPINE (TRILEPTAL) 300 MG TAB OXcarbazepine (TRILEPTAL) 300 MG Tab       Take 1 tablet (300 mg total) by mouth 2 (two) times daily.    Take 300 mg by mouth 2 (two) times daily.    PANTOPRAZOLE (PROTONIX) 40 MG TABLET pantoprazole (PROTONIX) 40 MG tablet       Take 1 tablet (40 mg total) by mouth once daily.    pantoprazole 40 mg  tablet,delayed release    TRAZODONE (DESYREL) 50 MG TABLET traZODone (DESYREL) 50 MG tablet       Take 1 tablet (50 mg total) by mouth nightly as needed for Insomnia.    Take  mg by mouth nightly as needed.       Allergies  Review of patient's allergies indicates:   Allergen Reactions    Corn containing products Anaphylaxis    Corn meal-luffa cylindrica frt Anaphylaxis, Hives, Itching and Rash    Orange Hives and Itching    Sulfur     Sulfa (sulfonamide antibiotics) Rash, Hives and Itching       Physical Examination     Vitals:    06/16/22 1334   BP: 114/74   Pulse: 83   Resp: 20   Temp: 98.6 °F (37 °C)     Physical Exam  Vitals and nursing note reviewed.   Constitutional:       Appearance: Normal appearance. She is not ill-appearing.   HENT:      Head: Normocephalic.      Right Ear: Tympanic membrane normal.      Left Ear: Tympanic membrane normal.      Nose: Nose normal.      Mouth/Throat:      Mouth: Mucous membranes are moist.   Eyes:      Extraocular Movements: Extraocular movements intact.      Conjunctiva/sclera: Conjunctivae normal.      Pupils: Pupils are equal, round, and reactive to light.   Cardiovascular:      Rate and Rhythm: Normal rate and regular rhythm.      Pulses: Normal pulses.   Pulmonary:      Effort: Pulmonary effort is normal. No respiratory distress.      Breath sounds: Normal breath sounds.   Abdominal:      General: Bowel sounds are normal. There is no distension.      Palpations: Abdomen is soft. There is no mass.      Tenderness: There is no abdominal tenderness.      Hernia: No hernia is present.   Musculoskeletal:         General: Normal range of motion.      Cervical back: Normal range of motion and neck supple.      Right lower leg: No edema.      Left lower leg: No edema.   Skin:     General: Skin is warm and dry.      Capillary Refill: Capillary refill takes less than 2 seconds.   Neurological:      Mental Status: She is alert and oriented to person, place, and time.  Mental status is at baseline.      Motor: No weakness.   Psychiatric:         Mood and Affect: Mood normal.         Behavior: Behavior normal.         Thought Content: Thought content normal.         Judgment: Judgment normal.           Results     Lab Results   Component Value Date    WBC 9.1 10/20/2021    RBC 5.16 10/20/2021    HGB 14.1 10/20/2021    HCT 41.4 10/20/2021    MCV 80.2 10/20/2021    MCH 27.3 10/20/2021    MCHC 34.1 10/20/2021    RDW 14.0 10/20/2021     10/20/2021    MPV 9.2 10/20/2021     CMP  Sodium Level   Date Value Ref Range Status   10/20/2021 136 136 - 145 mmol/L Final     Potassium Level   Date Value Ref Range Status   10/20/2021 3.6 3.5 - 5.1 mmol/L Final     Carbon Dioxide   Date Value Ref Range Status   10/20/2021 20 (L) 22 - 29 mmol/L Final     Blood Urea Nitrogen   Date Value Ref Range Status   10/20/2021 9.7 7.0 - 18.7 mg/dL Final     Creatinine   Date Value Ref Range Status   10/20/2021 0.99 0.55 - 1.02 mg/dL Final     Calcium Level Total   Date Value Ref Range Status   10/20/2021 9.7 8.4 - 10.2 mg/dL Final     Albumin Level   Date Value Ref Range Status   10/20/2021 3.5 3.5 - 5.0 gm/dL Final     Bilirubin Total   Date Value Ref Range Status   10/20/2021 0.2 <<=1.5 mg/dL Final     Alkaline Phosphatase   Date Value Ref Range Status   10/20/2021 92 40 - 150 unit/L Final     Aspartate Aminotransferase   Date Value Ref Range Status   10/20/2021 15 5 - 34 unit/L Final     Alanine Aminotransferase   Date Value Ref Range Status   10/20/2021 9 0 - 55 unit/L Final     Estimated GFR-Non    Date Value Ref Range Status   10/20/2021 72 (L) >>=90 mL/min/1.73 m2 Final     Lab Results   Component Value Date    CHOL 181 10/20/2021     Lab Results   Component Value Date    HDL 29 (L) 10/20/2021     No results found for: LDLCALC  Lab Results   Component Value Date    TRIG 351 (H) 10/20/2021     No results found for: CHOLHDL  Lab Results   Component Value Date    TSH 1.3068 10/20/2021      Lab Results   Component Value Date    PHUR 7.0 04/06/2018    PROTEINUA Negative 04/06/2018    GLUCUA Normal 04/06/2018    KETONESU Negative 04/06/2018    OCCULTUA Negative 04/06/2018    NITRITE Negative 04/06/2018    LEUKOCYTESUR 25 (A) 04/06/2018           Assessment and Plan (including Health Maintenance)     Plan:         Health Maintenance Due   Topic Date Due    Hepatitis C Screening  Never done    Pap Smear  Never done    COVID-19 Vaccine (3 - Booster for Pfizer series) 11/02/2021       Problem List Items Addressed This Visit        Psychiatric    Anxiety state    Current Assessment & Plan     Chronic anxiety/ depression. She was previously seen by Dr. Carrillo and Shad Grullon, NP at [facility] however she states she has had issues receiving prescriptions and communication with providers/ clinics. She is requesting new provider. She needs refill of Klonopin (1 day remaining of current script) and unable to receive from prior psychiatrist/ mental health provider. Referral to Dr. Damian Ward and contact information provided to patient to follow up on appointment status. Any worsening s/s, notify provider.            Relevant Orders    Ambulatory referral/consult to Psychiatry    Depressive disorder - Primary    Current Assessment & Plan     Chronic anxiety/ depression. She was previously seen by Dr. Carrillo and Shad Grullon, NP at [facility] however she states she has had issues receiving prescriptions and communication with providers/ clinics. She is requesting new provider. She needs refill of Trilleptal (1 day remaining of current script) and unable to receive from prior psychiatrist/ mental health provider. She was on samples of Trintellix per patient however unable to confirm this and she states her insurance was not covering it/ needed a PA?   Referral to Dr. Damian Ward and contact information provided to patient to follow up on appointment status. Any worsening s/s, notify provider.             Relevant Orders    Ambulatory referral/consult to Psychiatry       Cardiac/Vascular    Heart palpitations    Current Assessment & Plan     H/o heart palpitations, syncope/ dizziness. See EKG/ Holter/Echo results.   Referred to Cardiology January 2022- no appt received ?   On propranolol per Neurologist pt reports              Other    Insomnia    Current Assessment & Plan     Rx refilled for Trazodone.            Syncope    Current Assessment & Plan     H/o heart palpitations, syncope, dizziness. See EKG/ Holter/ Echo results. Referred to Cardiology January 2022- no appointment noted.                 Health Maintenance Topics with due status: Not Due       Topic Last Completion Date    TETANUS VACCINE 11/10/2020       Future Appointments   Date Time Provider Department Center   9/13/2022  8:15 AM Ariela Faye NP [facility]   9/13/2022 10:45 AM Daly Colon MD [facility] NEURO [facility]            Signature:  Ariela Faye NP  [facility]  [address] [id number]    Date of encounter: 6/16/22

## 2022-06-16 NOTE — TELEPHONE ENCOUNTER
Please follow up with [facility] Cardiology clinic. She was referred January 2022 however has not received an appointment. Please follow up regarding.     Thank you

## 2022-06-20 NOTE — PROGRESS NOTES
CHIEF COMPLAINT:   Chief Complaint   Patient presents with    palpitations, dizziness, racing heart     New patient, C/O years of racing heart, dizziness, palpitations, symptoms increased December 2021                   Review of patient's allergies indicates:   Allergen Reactions    Corn containing products Anaphylaxis    Corn meal-luffa cylindrica frt Anaphylaxis, Hives, Itching and Rash    Orange Hives and Itching    Sulfur     Sulfa (sulfonamide antibiotics) Rash, Hives and Itching                                          HPI:  Salma Montero 27 y.o. female who presents to [facility] as an initial referral for palpitations.  She has past medical history of anxiety, depression and GERD.  The patient endorses a long standing history of palpitations since age 14, but reports progression of symptoms with associated chest pressure, dizziness, and syncope.  She reports that the palpitations vary from feeling irregular beat and heart racing.  She also endorses multiple syncopal episodes with the palpitations, however she states this usually occurs in stressful situations.  Of note, the patient reports that her syncopal episode may have also been related to polypharmacy.  Upon adjustment of her medications (removing Effexor) and the addition of propranolol the patient does report improvement in her symptoms.  The patient had echocardiogram on 03/15/2022 that revealed an intact left ventricular ejection fraction that measured approximately 55% and no significant valvular abnormalities.(see below).  24 hour Holter monitor in Febuary 2022 revealed sinus rhythm, average ventricular rate is elevated 100.  Patient was noted to be tachycardiac 42% of the time.  There were rare PACs and PVCs.  No atrial fibrillation, SVT or VT noted.  Of note, the patient has also been followed by Neurology and had a normal EEG in January of 2022.    ECHO 3.15.22  Left ventricular ejection fraction measured  approximately 55%  Structurally normal mitral valve  Structurally normal trileaflet aortic valve  Tricuspid valve is structurally normal  Trace tricuspid regurgitation  Pulmonary arterial systolic pressure is estimated to be normal  Pulmonic valve is structurally normal  Mild pulmonic regurgitation present  Normal size left atrium  Number right atrial size  Normal right ventricular chamber size and function                                                                                                                                                                                          Patient Active Problem List   Diagnosis    Hyperhidrosis    Insomnia    Migraine headache    Tinnitus    Obesity    Depressive disorder    Anxiety state    Vitamin D deficiency    Polypharmacy    Allergic rhinitis    Elevated low density lipoprotein (LDL) cholesterol level    Gastroesophageal reflux disease    Heart palpitations    Syncope     Past Surgical History:   Procedure Laterality Date    NISSEN FUNDOPLICATION      THROAT SURGERY      TONSILLECTOMY AND ADENOIDECTOMY      TONSILLECTOMY, ADENOIDECTOMY       Social History     Socioeconomic History    Marital status:    Tobacco Use    Smoking status: Never Smoker    Smokeless tobacco: Never Used   Substance and Sexual Activity    Alcohol use: Yes     Alcohol/week: 2.0 standard drinks     Types: 2 Shots of liquor per week     Comment: once a month    Drug use: Never    Sexual activity: Yes     Partners: Male     Social Determinants of Health     Physical Activity: Inactive    Days of Exercise per Week: 0 days    Minutes of Exercise per Session: 0 min        Family History   Problem Relation Age of Onset    Heart failure Father     Cancer Maternal Grandmother     Bipolar disorder Maternal Grandmother     Heart failure Maternal Grandfather     Diabetes Maternal Grandfather          Current Outpatient Medications:     clonazePAM (KLONOPIN)  0.5 MG tablet, Take 1 tablet (0.5 mg total) by mouth 2 (two) times daily., Disp: 60 tablet, Rfl: 0    fexofenadine-pseudoephedrine (ALLEGRA-D 24) 180-240 mg per 24 hr tablet, fexofenadine-pseudoephedrine  mg-240 mg tablet,ext.release 24 hr  Take 1 tablet every day by oral route., Disp: , Rfl:     glycopyrrolate (ROBINUL) 1 mg Tab, Take 1 mg by mouth once daily., Disp: , Rfl:     L norgest/e.estradioL-e.estrad (SEASONIQUE) 0.15 mg-30 mcg (84)/10 mcg (7) 3MPk, Ashlyna 0.15 mg-30 mcg (84)/10 mcg(7) tablets,3 month dose pack  Take 1 tablet every day by oral route., Disp: , Rfl:     loratadine (CLARITIN) 10 mg tablet, Take 10 mg by mouth once daily., Disp: , Rfl:     OXcarbazepine (TRILEPTAL) 300 MG Tab, Take 1 tablet (300 mg total) by mouth 2 (two) times daily., Disp: 60 tablet, Rfl: 0    pantoprazole (PROTONIX) 40 MG tablet, Take 1 tablet (40 mg total) by mouth once daily., Disp: 90 tablet, Rfl: 3    promethazine (PHENERGAN) 12.5 MG Tab, Take 1 tablet (12.5 mg total) by mouth every 4 (four) hours as needed (nausea)., Disp: 90 tablet, Rfl: 4    propranoloL (INDERAL) 20 MG tablet, Take 1 tablet (20 mg total) by mouth 2 (two) times daily., Disp: 60 tablet, Rfl: 4    rizatriptan (MAXALT) 5 MG tablet, Take 1 tablet (5 mg total) by mouth as needed for Migraine (every 2 hour as needed with 2 tablet in 24 hours)., Disp: 9 tablet, Rfl: 4    traZODone (DESYREL) 50 MG tablet, Take 1 tablet (50 mg total) by mouth nightly as needed for Insomnia., Disp: 30 tablet, Rfl: 0    TRINTELLIX 5 mg Tab, Take 1 tablet by mouth once daily., Disp: , Rfl:     vitamin D (VITAMIN D3) 1000 units Tab, Take 2,000 Units by mouth once daily., Disp: , Rfl:      ROS:                                                                                                                                                                             Review of Systems   Constitutional: Negative.    HENT: Negative.    Eyes: Negative.    Respiratory:  "Negative.  Negative for shortness of breath.    Cardiovascular: Positive for chest pain and palpitations.   Gastrointestinal: Negative.    Genitourinary: Negative.    Musculoskeletal: Negative.    Skin: Negative.    Neurological: Positive for dizziness.        Syncope   Endo/Heme/Allergies: Negative.    Psychiatric/Behavioral: Negative.         Blood pressure 134/83, pulse 80, temperature 98.4 °F (36.9 °C), resp. rate 20, height 5' 5.75" (1.67 m), weight 107 kg (235 lb 14.3 oz), last menstrual period 05/15/2022.   PE:  Physical Exam  HENT:      Head: Normocephalic.      Nose: Nose normal.   Eyes:      Pupils: Pupils are equal, round, and reactive to light.   Cardiovascular:      Rate and Rhythm: Normal rate and regular rhythm.   Pulmonary:      Effort: Pulmonary effort is normal.   Abdominal:      General: Abdomen is flat. Bowel sounds are normal.      Palpations: Abdomen is soft.   Musculoskeletal:         General: Normal range of motion.      Cervical back: Normal range of motion.   Skin:     General: Skin is warm.   Neurological:      General: No focal deficit present.      Mental Status: She is alert.   Psychiatric:         Mood and Affect: Mood normal.           ASSESSMENT/PLAN:  Palpitations/Tachycardia   - Reports intermittent palpitations and tachycardia, but endorses some improvement with propanolol  - LVEF- 55% with significant valvular abnormalities (March 2022)  - Holter monitor- sinus rhythm, average ventricular rate is elevated 100.  Patient was noted to be tachycardiac 42% of the time.  There were rare PACs and PVCs.  No atrial fibrillation, SVT or VT noted (Feb. 2022)  - Continue propanolol 20 mg BID   - instructed to avoid caffeine and remain properly hydrated.      Syncope   - reprots multiple episodes of syncopal episodes. Last episode April 22 (see HPI)  - Holter monitor - (see above)  - EEG -normal (Jan. 2022)  - 2 week event monitor for further evaluation for any arrhthymias    - Carotid US r/o " carotid stenosis     Chest pain   - Endorses left sided chest pressure with palpitations. Relieves spontanelusly  - TST to assess for ischemic etiology of symptoms      TST  2 week event monitor   Carotid US  Follow up in cardiology clinic in 2 months or sooner pending results  Follow up with PCP/neurology as directed

## 2022-06-21 ENCOUNTER — OFFICE VISIT (OUTPATIENT)
Dept: CARDIOLOGY | Facility: CLINIC | Age: 27
End: 2022-06-21
Payer: MEDICAID

## 2022-06-21 VITALS
HEART RATE: 80 BPM | BODY MASS INDEX: 37.91 KG/M2 | TEMPERATURE: 98 F | SYSTOLIC BLOOD PRESSURE: 134 MMHG | RESPIRATION RATE: 20 BRPM | DIASTOLIC BLOOD PRESSURE: 83 MMHG | WEIGHT: 235.88 LBS | HEIGHT: 66 IN

## 2022-06-21 DIAGNOSIS — G43.709 CHRONIC MIGRAINE WITHOUT AURA WITHOUT STATUS MIGRAINOSUS, NOT INTRACTABLE: ICD-10-CM

## 2022-06-21 DIAGNOSIS — R00.2 PALPITATIONS: Primary | ICD-10-CM

## 2022-06-21 DIAGNOSIS — F41.1 ANXIETY STATE: ICD-10-CM

## 2022-06-21 DIAGNOSIS — R61 HYPERHIDROSIS: ICD-10-CM

## 2022-06-21 DIAGNOSIS — F32.A DEPRESSIVE DISORDER: ICD-10-CM

## 2022-06-21 DIAGNOSIS — R07.89 OTHER CHEST PAIN: ICD-10-CM

## 2022-06-21 DIAGNOSIS — R55 SYNCOPE AND COLLAPSE: ICD-10-CM

## 2022-06-21 DIAGNOSIS — Z79.899 POLYPHARMACY: ICD-10-CM

## 2022-06-21 PROCEDURE — 99214 OFFICE O/P EST MOD 30 MIN: CPT | Mod: PBBFAC | Performed by: NURSE PRACTITIONER

## 2022-06-21 PROCEDURE — 99214 OFFICE O/P EST MOD 30 MIN: CPT | Mod: S$PBB,,, | Performed by: NURSE PRACTITIONER

## 2022-06-21 PROCEDURE — 3079F PR MOST RECENT DIASTOLIC BLOOD PRESSURE 80-89 MM HG: ICD-10-PCS | Mod: CPTII,,, | Performed by: NURSE PRACTITIONER

## 2022-06-21 PROCEDURE — 3075F PR MOST RECENT SYSTOLIC BLOOD PRESS GE 130-139MM HG: ICD-10-PCS | Mod: CPTII,,, | Performed by: NURSE PRACTITIONER

## 2022-06-21 PROCEDURE — 3008F BODY MASS INDEX DOCD: CPT | Mod: CPTII,,, | Performed by: NURSE PRACTITIONER

## 2022-06-21 PROCEDURE — 1160F PR REVIEW ALL MEDS BY PRESCRIBER/CLIN PHARMACIST DOCUMENTED: ICD-10-PCS | Mod: CPTII,,, | Performed by: NURSE PRACTITIONER

## 2022-06-21 PROCEDURE — 1159F PR MEDICATION LIST DOCUMENTED IN MEDICAL RECORD: ICD-10-PCS | Mod: CPTII,,, | Performed by: NURSE PRACTITIONER

## 2022-06-21 PROCEDURE — 1159F MED LIST DOCD IN RCRD: CPT | Mod: CPTII,,, | Performed by: NURSE PRACTITIONER

## 2022-06-21 PROCEDURE — 1160F RVW MEDS BY RX/DR IN RCRD: CPT | Mod: CPTII,,, | Performed by: NURSE PRACTITIONER

## 2022-06-21 PROCEDURE — 3075F SYST BP GE 130 - 139MM HG: CPT | Mod: CPTII,,, | Performed by: NURSE PRACTITIONER

## 2022-06-21 PROCEDURE — 3079F DIAST BP 80-89 MM HG: CPT | Mod: CPTII,,, | Performed by: NURSE PRACTITIONER

## 2022-06-21 PROCEDURE — 99214 PR OFFICE/OUTPT VISIT, EST, LEVL IV, 30-39 MIN: ICD-10-PCS | Mod: S$PBB,,, | Performed by: NURSE PRACTITIONER

## 2022-06-21 PROCEDURE — 3008F PR BODY MASS INDEX (BMI) DOCUMENTED: ICD-10-PCS | Mod: CPTII,,, | Performed by: NURSE PRACTITIONER

## 2022-06-21 RX ORDER — CHOLECALCIFEROL (VITAMIN D3) 25 MCG
2000 TABLET ORAL DAILY
COMMUNITY
End: 2022-12-13 | Stop reason: SDUPTHER

## 2022-06-21 RX ORDER — VORTIOXETINE 5 MG/1
1 TABLET, FILM COATED ORAL DAILY
COMMUNITY
Start: 2022-06-18 | End: 2022-07-01 | Stop reason: DRUGHIGH

## 2022-06-21 RX ORDER — LORATADINE 10 MG/1
10 TABLET ORAL DAILY
COMMUNITY
End: 2023-09-26

## 2022-06-21 NOTE — PATIENT INSTRUCTIONS
TST  2 week event monitor   Carotid US  Follow up in cardiology clinic in 2 months or sooner pending results  Follow up with PCP/neurology as directed

## 2022-06-29 ENCOUNTER — HOSPITAL ENCOUNTER (OUTPATIENT)
Dept: CARDIOLOGY | Facility: HOSPITAL | Age: 27
Discharge: HOME | End: 2022-06-29
Attending: NURSE PRACTITIONER
Payer: MEDICAID

## 2022-06-29 VITALS — HEART RATE: 85 BPM | DIASTOLIC BLOOD PRESSURE: 88 MMHG | RESPIRATION RATE: 20 BRPM | SYSTOLIC BLOOD PRESSURE: 123 MMHG

## 2022-06-29 DIAGNOSIS — R00.2 PALPITATIONS: ICD-10-CM

## 2022-06-29 DIAGNOSIS — R07.89 OTHER CHEST PAIN: ICD-10-CM

## 2022-06-29 LAB
CV STRESS BASE HR: 96 BPM
DIASTOLIC BLOOD PRESSURE: 70 MMHG
OHS CV CPX 85 PERCENT MAX PREDICTED HEART RATE MALE: 155
OHS CV CPX ESTIMATED METS: 7
OHS CV CPX MAX PREDICTED HEART RATE: 182
OHS CV CPX PATIENT IS FEMALE: 1
OHS CV CPX PATIENT IS MALE: 0
OHS CV CPX PEAK DIASTOLIC BLOOD PRESSURE: 74 MMHG
OHS CV CPX PEAK HEAR RATE: 148 BPM
OHS CV CPX PEAK RATE PRESSURE PRODUCT: NORMAL
OHS CV CPX PEAK SYSTOLIC BLOOD PRESSURE: 139 MMHG
OHS CV CPX PERCENT MAX PREDICTED HEART RATE ACHIEVED: 81
OHS CV CPX RATE PRESSURE PRODUCT PRESENTING: NORMAL
STRESS ECHO POST EXERCISE DUR MIN: 10 MINUTES
STRESS ECHO POST EXERCISE DUR SEC: 13 SECONDS
SYSTOLIC BLOOD PRESSURE: 116 MMHG

## 2022-06-29 PROCEDURE — 93017 CV STRESS TEST TRACING ONLY: CPT

## 2022-07-01 ENCOUNTER — OFFICE VISIT (OUTPATIENT)
Dept: BEHAVIORAL HEALTH | Facility: CLINIC | Age: 27
End: 2022-07-01
Payer: MEDICAID

## 2022-07-01 VITALS
HEIGHT: 65 IN | DIASTOLIC BLOOD PRESSURE: 68 MMHG | HEART RATE: 85 BPM | RESPIRATION RATE: 20 BRPM | WEIGHT: 237.5 LBS | SYSTOLIC BLOOD PRESSURE: 119 MMHG | OXYGEN SATURATION: 98 % | TEMPERATURE: 98 F | BODY MASS INDEX: 39.57 KG/M2

## 2022-07-01 DIAGNOSIS — F41.1 GAD (GENERALIZED ANXIETY DISORDER): ICD-10-CM

## 2022-07-01 DIAGNOSIS — G47.00 INSOMNIA, UNSPECIFIED TYPE: ICD-10-CM

## 2022-07-01 DIAGNOSIS — F43.10 PTSD (POST-TRAUMATIC STRESS DISORDER): ICD-10-CM

## 2022-07-01 DIAGNOSIS — F33.1 MODERATE EPISODE OF RECURRENT MAJOR DEPRESSIVE DISORDER: Primary | ICD-10-CM

## 2022-07-01 PROCEDURE — 3008F BODY MASS INDEX DOCD: CPT | Mod: CPTII,AF,HB, | Performed by: STUDENT IN AN ORGANIZED HEALTH CARE EDUCATION/TRAINING PROGRAM

## 2022-07-01 PROCEDURE — 99204 OFFICE O/P NEW MOD 45 MIN: CPT | Mod: S$PBB,AF,HB, | Performed by: STUDENT IN AN ORGANIZED HEALTH CARE EDUCATION/TRAINING PROGRAM

## 2022-07-01 PROCEDURE — 1159F MED LIST DOCD IN RCRD: CPT | Mod: CPTII,AF,HB, | Performed by: STUDENT IN AN ORGANIZED HEALTH CARE EDUCATION/TRAINING PROGRAM

## 2022-07-01 PROCEDURE — 99214 OFFICE O/P EST MOD 30 MIN: CPT | Mod: PBBFAC,PN | Performed by: STUDENT IN AN ORGANIZED HEALTH CARE EDUCATION/TRAINING PROGRAM

## 2022-07-01 PROCEDURE — 3078F PR MOST RECENT DIASTOLIC BLOOD PRESSURE < 80 MM HG: ICD-10-PCS | Mod: CPTII,AF,HB, | Performed by: STUDENT IN AN ORGANIZED HEALTH CARE EDUCATION/TRAINING PROGRAM

## 2022-07-01 PROCEDURE — 3078F DIAST BP <80 MM HG: CPT | Mod: CPTII,AF,HB, | Performed by: STUDENT IN AN ORGANIZED HEALTH CARE EDUCATION/TRAINING PROGRAM

## 2022-07-01 PROCEDURE — 3074F SYST BP LT 130 MM HG: CPT | Mod: CPTII,AF,HB, | Performed by: STUDENT IN AN ORGANIZED HEALTH CARE EDUCATION/TRAINING PROGRAM

## 2022-07-01 PROCEDURE — 1159F PR MEDICATION LIST DOCUMENTED IN MEDICAL RECORD: ICD-10-PCS | Mod: CPTII,AF,HB, | Performed by: STUDENT IN AN ORGANIZED HEALTH CARE EDUCATION/TRAINING PROGRAM

## 2022-07-01 PROCEDURE — 3008F PR BODY MASS INDEX (BMI) DOCUMENTED: ICD-10-PCS | Mod: CPTII,AF,HB, | Performed by: STUDENT IN AN ORGANIZED HEALTH CARE EDUCATION/TRAINING PROGRAM

## 2022-07-01 PROCEDURE — 3074F PR MOST RECENT SYSTOLIC BLOOD PRESSURE < 130 MM HG: ICD-10-PCS | Mod: CPTII,AF,HB, | Performed by: STUDENT IN AN ORGANIZED HEALTH CARE EDUCATION/TRAINING PROGRAM

## 2022-07-01 PROCEDURE — 1160F RVW MEDS BY RX/DR IN RCRD: CPT | Mod: CPTII,AF,HB, | Performed by: STUDENT IN AN ORGANIZED HEALTH CARE EDUCATION/TRAINING PROGRAM

## 2022-07-01 PROCEDURE — 99204 PR OFFICE/OUTPT VISIT, NEW, LEVL IV, 45-59 MIN: ICD-10-PCS | Mod: S$PBB,AF,HB, | Performed by: STUDENT IN AN ORGANIZED HEALTH CARE EDUCATION/TRAINING PROGRAM

## 2022-07-01 PROCEDURE — 1160F PR REVIEW ALL MEDS BY PRESCRIBER/CLIN PHARMACIST DOCUMENTED: ICD-10-PCS | Mod: CPTII,AF,HB, | Performed by: STUDENT IN AN ORGANIZED HEALTH CARE EDUCATION/TRAINING PROGRAM

## 2022-07-01 RX ORDER — OXCARBAZEPINE 300 MG/1
300 TABLET, FILM COATED ORAL 2 TIMES DAILY
Qty: 60 TABLET | Refills: 2 | Status: SHIPPED | OUTPATIENT
Start: 2022-07-01 | End: 2022-09-27 | Stop reason: SDUPTHER

## 2022-07-01 RX ORDER — TRAZODONE HYDROCHLORIDE 50 MG/1
50 TABLET ORAL NIGHTLY PRN
Qty: 30 TABLET | Refills: 5 | Status: SHIPPED | OUTPATIENT
Start: 2022-07-01 | End: 2022-11-07 | Stop reason: SDUPTHER

## 2022-07-01 RX ORDER — CLONAZEPAM 0.5 MG/1
0.5 TABLET ORAL 2 TIMES DAILY
Qty: 60 TABLET | Refills: 2 | Status: SHIPPED | OUTPATIENT
Start: 2022-07-01 | End: 2022-09-27 | Stop reason: SDUPTHER
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
## 2022-07-19 ENCOUNTER — HOSPITAL ENCOUNTER (OUTPATIENT)
Dept: CARDIOLOGY | Facility: HOSPITAL | Age: 27
Discharge: HOME | End: 2022-07-19
Attending: NURSE PRACTITIONER
Payer: MEDICAID

## 2022-07-19 DIAGNOSIS — R00.2 PALPITATIONS: ICD-10-CM

## 2022-07-25 ENCOUNTER — CLINICAL SUPPORT (OUTPATIENT)
Dept: FAMILY MEDICINE | Facility: CLINIC | Age: 27
End: 2022-07-25
Payer: MEDICAID

## 2022-07-25 DIAGNOSIS — F33.1 MODERATE EPISODE OF RECURRENT MAJOR DEPRESSIVE DISORDER: ICD-10-CM

## 2022-07-25 DIAGNOSIS — F41.1 GAD (GENERALIZED ANXIETY DISORDER): ICD-10-CM

## 2022-07-25 LAB
ALBUMIN SERPL-MCNC: 3.9 GM/DL (ref 3.5–5)
ALBUMIN/GLOB SERPL: 1.1 RATIO (ref 1.1–2)
ALP SERPL-CCNC: 83 UNIT/L (ref 40–150)
ALT SERPL-CCNC: 10 UNIT/L (ref 0–55)
AMPHET UR QL SCN: NEGATIVE
AST SERPL-CCNC: 17 UNIT/L (ref 5–34)
BARBITURATE SCN PRESENT UR: NEGATIVE
BENZODIAZ UR QL SCN: POSITIVE
BILIRUBIN DIRECT+TOT PNL SERPL-MCNC: 0.6 MG/DL
BUN SERPL-MCNC: 8.6 MG/DL (ref 7–18.7)
CALCIUM SERPL-MCNC: 9.6 MG/DL (ref 8.4–10.2)
CANNABINOIDS UR QL SCN: POSITIVE
CHLORIDE SERPL-SCNC: 107 MMOL/L (ref 98–107)
CO2 SERPL-SCNC: 20 MMOL/L (ref 22–29)
COCAINE UR QL SCN: NEGATIVE
CREAT SERPL-MCNC: 1 MG/DL (ref 0.55–1.02)
ERYTHROCYTE [DISTWIDTH] IN BLOOD BY AUTOMATED COUNT: 12.5 % (ref 11.5–17)
FENTANYL UR QL SCN: NEGATIVE
GLOBULIN SER-MCNC: 3.7 GM/DL (ref 2.4–3.5)
GLUCOSE SERPL-MCNC: 107 MG/DL (ref 74–100)
HCT VFR BLD AUTO: 41.6 % (ref 37–47)
HGB BLD-MCNC: 14.1 GM/DL (ref 12–16)
MCH RBC QN AUTO: 27.8 PG (ref 27–31)
MCHC RBC AUTO-ENTMCNC: 33.9 MG/DL (ref 33–36)
MCV RBC AUTO: 81.9 FL (ref 80–94)
MDMA UR QL SCN: NEGATIVE
NRBC BLD AUTO-RTO: 0 %
OPIATES UR QL SCN: NEGATIVE
PCP UR QL: NEGATIVE
PH UR: 6 [PH] (ref 3–11)
PLATELET # BLD AUTO: 370 X10(3)/MCL (ref 130–400)
PMV BLD AUTO: 9.8 FL (ref 7.4–10.4)
POTASSIUM SERPL-SCNC: 4.3 MMOL/L (ref 3.5–5.1)
PROT SERPL-MCNC: 7.6 GM/DL (ref 6.4–8.3)
RBC # BLD AUTO: 5.08 X10(6)/MCL (ref 4.2–5.4)
SODIUM SERPL-SCNC: 138 MMOL/L (ref 136–145)
SPECIFIC GRAVITY, URINE AUTO (.000) (OHS): 1.03 (ref 1–1.03)
TSH SERPL-ACNC: 1.62 UIU/ML (ref 0.35–4.94)
WBC # SPEC AUTO: 8 X10(3)/MCL (ref 4.5–11.5)

## 2022-07-25 PROCEDURE — 36415 COLL VENOUS BLD VENIPUNCTURE: CPT

## 2022-07-25 PROCEDURE — 80053 COMPREHEN METABOLIC PANEL: CPT

## 2022-07-25 PROCEDURE — 85027 COMPLETE CBC AUTOMATED: CPT

## 2022-07-25 PROCEDURE — 80307 DRUG TEST PRSMV CHEM ANLYZR: CPT

## 2022-07-25 PROCEDURE — 84443 ASSAY THYROID STIM HORMONE: CPT

## 2022-07-28 ENCOUNTER — OFFICE VISIT (OUTPATIENT)
Dept: BEHAVIORAL HEALTH | Facility: CLINIC | Age: 27
End: 2022-07-28
Payer: MEDICAID

## 2022-07-28 VITALS
HEIGHT: 65 IN | BODY MASS INDEX: 38.84 KG/M2 | OXYGEN SATURATION: 98 % | SYSTOLIC BLOOD PRESSURE: 110 MMHG | DIASTOLIC BLOOD PRESSURE: 70 MMHG | HEART RATE: 75 BPM | WEIGHT: 233.13 LBS | RESPIRATION RATE: 18 BRPM

## 2022-07-28 DIAGNOSIS — G47.00 INSOMNIA, UNSPECIFIED TYPE: ICD-10-CM

## 2022-07-28 DIAGNOSIS — F43.10 PTSD (POST-TRAUMATIC STRESS DISORDER): ICD-10-CM

## 2022-07-28 DIAGNOSIS — F41.1 GAD (GENERALIZED ANXIETY DISORDER): ICD-10-CM

## 2022-07-28 DIAGNOSIS — F33.1 MODERATE EPISODE OF RECURRENT MAJOR DEPRESSIVE DISORDER: Primary | ICD-10-CM

## 2022-07-28 PROCEDURE — 3078F DIAST BP <80 MM HG: CPT | Mod: CPTII,AF,HB, | Performed by: STUDENT IN AN ORGANIZED HEALTH CARE EDUCATION/TRAINING PROGRAM

## 2022-07-28 PROCEDURE — 99213 OFFICE O/P EST LOW 20 MIN: CPT | Mod: S$PBB,AF,HB, | Performed by: STUDENT IN AN ORGANIZED HEALTH CARE EDUCATION/TRAINING PROGRAM

## 2022-07-28 PROCEDURE — 3008F PR BODY MASS INDEX (BMI) DOCUMENTED: ICD-10-PCS | Mod: CPTII,AF,HB, | Performed by: STUDENT IN AN ORGANIZED HEALTH CARE EDUCATION/TRAINING PROGRAM

## 2022-07-28 PROCEDURE — 1159F PR MEDICATION LIST DOCUMENTED IN MEDICAL RECORD: ICD-10-PCS | Mod: CPTII,AF,HB, | Performed by: STUDENT IN AN ORGANIZED HEALTH CARE EDUCATION/TRAINING PROGRAM

## 2022-07-28 PROCEDURE — 1160F RVW MEDS BY RX/DR IN RCRD: CPT | Mod: CPTII,AF,HB, | Performed by: STUDENT IN AN ORGANIZED HEALTH CARE EDUCATION/TRAINING PROGRAM

## 2022-07-28 PROCEDURE — 3074F SYST BP LT 130 MM HG: CPT | Mod: CPTII,AF,HB, | Performed by: STUDENT IN AN ORGANIZED HEALTH CARE EDUCATION/TRAINING PROGRAM

## 2022-07-28 PROCEDURE — 99213 PR OFFICE/OUTPT VISIT, EST, LEVL III, 20-29 MIN: ICD-10-PCS | Mod: S$PBB,AF,HB, | Performed by: STUDENT IN AN ORGANIZED HEALTH CARE EDUCATION/TRAINING PROGRAM

## 2022-07-28 PROCEDURE — 3008F BODY MASS INDEX DOCD: CPT | Mod: CPTII,AF,HB, | Performed by: STUDENT IN AN ORGANIZED HEALTH CARE EDUCATION/TRAINING PROGRAM

## 2022-07-28 PROCEDURE — 1160F PR REVIEW ALL MEDS BY PRESCRIBER/CLIN PHARMACIST DOCUMENTED: ICD-10-PCS | Mod: CPTII,AF,HB, | Performed by: STUDENT IN AN ORGANIZED HEALTH CARE EDUCATION/TRAINING PROGRAM

## 2022-07-28 PROCEDURE — 99214 OFFICE O/P EST MOD 30 MIN: CPT | Mod: PBBFAC,PN | Performed by: STUDENT IN AN ORGANIZED HEALTH CARE EDUCATION/TRAINING PROGRAM

## 2022-07-28 PROCEDURE — 1159F MED LIST DOCD IN RCRD: CPT | Mod: CPTII,AF,HB, | Performed by: STUDENT IN AN ORGANIZED HEALTH CARE EDUCATION/TRAINING PROGRAM

## 2022-07-28 PROCEDURE — 3074F PR MOST RECENT SYSTOLIC BLOOD PRESSURE < 130 MM HG: ICD-10-PCS | Mod: CPTII,AF,HB, | Performed by: STUDENT IN AN ORGANIZED HEALTH CARE EDUCATION/TRAINING PROGRAM

## 2022-07-28 PROCEDURE — 3078F PR MOST RECENT DIASTOLIC BLOOD PRESSURE < 80 MM HG: ICD-10-PCS | Mod: CPTII,AF,HB, | Performed by: STUDENT IN AN ORGANIZED HEALTH CARE EDUCATION/TRAINING PROGRAM

## 2022-07-28 NOTE — PROGRESS NOTES
"Outpatient Psychiatry Follow-Up Visit    7/28/2022    Clinical Status of Patient:  Outpatient (Ambulatory)    Chief Complaint:  Salma Montero is a 27 y.o. female who presents today for follow-up of MDD, FLORENCE, PTSD, insomnia. Patient last seen for initial evaluation on 7/1/2022. Met with patient.      Interval History and Content of Current Session:  Interim Events/Subjective Report/Content of Current Session:   Mood "cullen," better than at last visit.  Anxiety improved, occasionally elevated.  Appetite decreased.  Sleeping better, but sometimes difficult 2/2 migraines.  Energy "crappy," low.  Poor concentration.  Denies problems with motivation.  Denies SI/HI/AVH/paranoia, denies plan or desire for self harm or harm to others.   Notes vomiting (red at times), +GI cramping, +diarrhea.  Notes history of such symptoms (notes GI symptoms were worse in March, worsened recently).  Notes good medication adherence.  Pt with some concern that her GI symptoms and headaches are caused by her current medication regimen.  Pt voices interest in making medication change to address ongoing symptoms.     Psychiatric Review of Systems-is patient experiencing or having changes in  Anxiety: improved but still elevated  Sleep: better  Appetite: decreased  Weight: decreased  Energy: low  Concentration: poor   Libido: no problem voiced  Irritability: yes  Motivation: fair  Guilt/hopelessness: denies  Paranoia/delusions: denies  SIB/risky behavior: denies    Review of Systems   · PSYCHIATRIC: Pertinant items are noted in the narrative.  · CONSTITUTIONAL: No weight gain or loss.  · MUSCULOSKELETAL: No pain or stiffness of the joints.  · NEUROLOGIC: No weakness, sensory changes, seizures, confusion, memory loss, tremor or other abnormal movements.  · RESPIRATORY: No shortness of breath.  · CARDIOVASCULAR: No tachycardia or chest pain.  · GASTROINTESTINAL: +vomiting (sometimes red), poor appetite, diarrhea    Past Medical, Family and " "Social History: The patient's past medical, family and social history have been reviewed and updated as appropriate within the electronic medical record - see encounter notes.    Compliance: good    Side effects: HA and appetite loss    Risk Parameters:  Patient reports no suicidal ideation  Patient reports no homicidal ideation  Patient reports no self-injurious behavior  Patient reports no violent behavior    Exam (detailed: at least 9 elements; comprehensive: all 15 elements)   Constitutional  Vitals:  Most recent vital signs, dated less than 90 days prior to this appointment, were reviewed.     Vitals:    07/28/22 1357   BP: 110/70   Pulse: 75   Resp: 18   SpO2: 98%   Weight: 105.7 kg (233 lb 1.6 oz)   Height: 5' 5" (1.651 m)        General:   Constitutional: No acute distress, appears stated age, casually dressed    Neurologic:   Motor: moves all extremities spontaneously and without difficulty, no abnormal involuntary movements observed  Gait: normal gait and station    Mental status examination:   Appearance: appears stated age, casually dressed, no acute distress  Behavior: unremarkable for situation, calm and cooperative  Mood: "cullen"  Affect: mood congruent and euthymic  Thought process: linear and goal directed  Associations: appropriate for conversation  Thought content: no plan or desire for self harm or harm to others, denies paranoia, no delusional ideation volunteered  Perceptions: denies hallucinations or other altered perceptions  Orientation: oriented to day of week, month, year, location and situation  Language: English, fluid  Attention: able to attend to interview  Insight: good  Judgement: good    PHQ9 7/28/2022   Total Score 15     GAD7 7/28/2022 7/1/2022   1. Feeling nervous, anxious, or on edge? 2 3   2. Not being able to stop or control worrying? 1 3   3. Worrying too much about different things? 2 3   4. Trouble relaxing? 0 3   5. Being so restless that it is hard to sit still? 0 3   6. " Becoming easily annoyed or irritable? 2 3   7. Feeling afraid as if something awful might happen? 2 3   8. If you checked off any problems, how difficult have these problems made it for you to do your work, take care of things at home, or get along with other people? 1 3   FLORENCE-7 Score 9 21     Assessment and Diagnosis   Status/Progress: Based on the examination today, the patient's problem(s) is/are adequately but not ideally controlled.  New problems have not been presented today.   Co-morbidities and Lack of compliance are not complicating management of the primary condition.       General Impression:    ICD-10-CM ICD-9-CM   1. Moderate episode of recurrent major depressive disorder  F33.1 296.32   2. PTSD (post-traumatic stress disorder)  F43.10 309.81   3. FLORENCE (generalized anxiety disorder)  F41.1 300.02   4. Insomnia, unspecified type  G47.00 780.52     Intervention/Counseling/Treatment Plan   · Continue klonopin 1mg bid  · Continue trazodone 50mg nightly prn insomnia  · Continue trileptal 300mg bid  · Continue trintellix 10mg daily, consider uptitration at next visit  · Recommend pt establish with a psychotherapist/counselor, provided names of providers in the [place] area  · Recommended pt discuss her current GI symptoms with PCP, pt with some concern that her trintellix is causing her GI symptoms  · Ordered UDS, CMP, CBC, TSH  · No need for PEC as pt is not an imminent danger to self or others or gravely disabled due to acute psychiatric illness  · Discussed that pt should either call clinic for psychiatric crisis symptoms or present to nearest emergency room    Discussed with patient informed consent including diagnosis, risks and benefits of proposed treatment above vs. alternative treatments vs. no treatment, as well as serious and common side effects of these treatments, and the inherent unpredictability of individual responses to these treatments. The patient expresses understanding of the above and  displays the capacity to agree with this current plan. Patient also agrees that, currently, the benefits outweigh the risks and would like to pursue treatment at this time, and had no other questions.    Instructions:  · Take all medications as prescribed.    · Abstain from recreational drugs and alcohol.  · Present to ED or call 911 for SI/HI plan or intent, psychosis, or medical emergency.    Return to Clinic: Follow up in about 4 weeks (around 8/25/2022).    Total time: Total time spent with patient 20 minutes with >50% spent on counseling/coordination of care.     Damian Ward MD  [facility]

## 2022-08-16 DIAGNOSIS — R55 NEAR SYNCOPE: Primary | ICD-10-CM

## 2022-08-24 ENCOUNTER — DOCUMENTATION ONLY (OUTPATIENT)
Dept: CARDIOLOGY | Facility: CLINIC | Age: 27
End: 2022-08-24
Payer: MEDICAID

## 2022-08-24 ENCOUNTER — TELEPHONE (OUTPATIENT)
Dept: CARDIOLOGY | Facility: CLINIC | Age: 27
End: 2022-08-24
Payer: MEDICAID

## 2022-08-24 NOTE — PROGRESS NOTES
Please excuse patient from jury duty on 9.12.22. The patient has scheduled cardiac diagnostic testing on this day for further evaluation of syncopal episodes.    Thank you   [facility]- [facility]

## 2022-08-24 NOTE — TELEPHONE ENCOUNTER
Spoke with pt to inform her that Jury Duty request would be faxed to [address] at [phone].    ----- Message from Helene Briones sent at 8/24/2022  8:59 AM CDT -----  Patient called this morning and wants to know if she can get a doctor's excuse for Jury Duty that she is scheduled for on 9/12/2022.  She is scheduled that morning at 10:00 to have a Vas US done.  Please advise.    Thank you,

## 2022-08-25 ENCOUNTER — OFFICE VISIT (OUTPATIENT)
Dept: BEHAVIORAL HEALTH | Facility: CLINIC | Age: 27
End: 2022-08-25
Payer: MEDICAID

## 2022-08-25 VITALS
BODY MASS INDEX: 38.38 KG/M2 | WEIGHT: 230.38 LBS | SYSTOLIC BLOOD PRESSURE: 105 MMHG | HEART RATE: 109 BPM | HEIGHT: 65 IN | DIASTOLIC BLOOD PRESSURE: 70 MMHG

## 2022-08-25 DIAGNOSIS — F41.1 GAD (GENERALIZED ANXIETY DISORDER): ICD-10-CM

## 2022-08-25 DIAGNOSIS — F43.10 PTSD (POST-TRAUMATIC STRESS DISORDER): ICD-10-CM

## 2022-08-25 DIAGNOSIS — F33.1 MODERATE EPISODE OF RECURRENT MAJOR DEPRESSIVE DISORDER: Primary | ICD-10-CM

## 2022-08-25 PROCEDURE — 3008F PR BODY MASS INDEX (BMI) DOCUMENTED: ICD-10-PCS | Mod: CPTII,,, | Performed by: STUDENT IN AN ORGANIZED HEALTH CARE EDUCATION/TRAINING PROGRAM

## 2022-08-25 PROCEDURE — 99213 OFFICE O/P EST LOW 20 MIN: CPT | Mod: AF,HB,S$PBB, | Performed by: STUDENT IN AN ORGANIZED HEALTH CARE EDUCATION/TRAINING PROGRAM

## 2022-08-25 PROCEDURE — 3074F PR MOST RECENT SYSTOLIC BLOOD PRESSURE < 130 MM HG: ICD-10-PCS | Mod: CPTII,,, | Performed by: STUDENT IN AN ORGANIZED HEALTH CARE EDUCATION/TRAINING PROGRAM

## 2022-08-25 PROCEDURE — 3008F BODY MASS INDEX DOCD: CPT | Mod: CPTII,,, | Performed by: STUDENT IN AN ORGANIZED HEALTH CARE EDUCATION/TRAINING PROGRAM

## 2022-08-25 PROCEDURE — 99213 PR OFFICE/OUTPT VISIT, EST, LEVL III, 20-29 MIN: ICD-10-PCS | Mod: AF,HB,S$PBB, | Performed by: STUDENT IN AN ORGANIZED HEALTH CARE EDUCATION/TRAINING PROGRAM

## 2022-08-25 PROCEDURE — 99213 OFFICE O/P EST LOW 20 MIN: CPT | Mod: PBBFAC,PN | Performed by: STUDENT IN AN ORGANIZED HEALTH CARE EDUCATION/TRAINING PROGRAM

## 2022-08-25 PROCEDURE — 1160F PR REVIEW ALL MEDS BY PRESCRIBER/CLIN PHARMACIST DOCUMENTED: ICD-10-PCS | Mod: CPTII,,, | Performed by: STUDENT IN AN ORGANIZED HEALTH CARE EDUCATION/TRAINING PROGRAM

## 2022-08-25 PROCEDURE — 3078F DIAST BP <80 MM HG: CPT | Mod: CPTII,,, | Performed by: STUDENT IN AN ORGANIZED HEALTH CARE EDUCATION/TRAINING PROGRAM

## 2022-08-25 PROCEDURE — 3074F SYST BP LT 130 MM HG: CPT | Mod: CPTII,,, | Performed by: STUDENT IN AN ORGANIZED HEALTH CARE EDUCATION/TRAINING PROGRAM

## 2022-08-25 PROCEDURE — 1159F MED LIST DOCD IN RCRD: CPT | Mod: CPTII,,, | Performed by: STUDENT IN AN ORGANIZED HEALTH CARE EDUCATION/TRAINING PROGRAM

## 2022-08-25 PROCEDURE — 1159F PR MEDICATION LIST DOCUMENTED IN MEDICAL RECORD: ICD-10-PCS | Mod: CPTII,,, | Performed by: STUDENT IN AN ORGANIZED HEALTH CARE EDUCATION/TRAINING PROGRAM

## 2022-08-25 PROCEDURE — 1160F RVW MEDS BY RX/DR IN RCRD: CPT | Mod: CPTII,,, | Performed by: STUDENT IN AN ORGANIZED HEALTH CARE EDUCATION/TRAINING PROGRAM

## 2022-08-25 PROCEDURE — 3078F PR MOST RECENT DIASTOLIC BLOOD PRESSURE < 80 MM HG: ICD-10-PCS | Mod: CPTII,,, | Performed by: STUDENT IN AN ORGANIZED HEALTH CARE EDUCATION/TRAINING PROGRAM

## 2022-08-25 NOTE — PROGRESS NOTES
"Outpatient Psychiatry Follow-Up Visit    2022    Clinical Status of Patient:  Outpatient (Ambulatory)    Chief Complaint:  Salma Montero is a 27 y.o. female who presents today for follow-up of MDD, FLORENCE, PTSD, insomnia. Patient last seen for initial evaluation on 2022. Met with patient.      Interval History and Content of Current Session:  Interim Events/Subjective Report/Content of Current Session:   Pt reports doing "ok" since last visit.  Reports mood has been fair and anxiety increased.  Notes due to  father's birthday (today).  Notes sleep has been poor recently 2/2 nocturnal panic/nightmares.  Appetite low, losing weight.  Has ongoing medical workups for tachycardia and LOC.  Pt has upcoming appt with provider at [facility] next month.  Pt denies medication SE.  Pt voices interest in medication change to address ongoing symptoms.      Psychiatric Review of Systems-is patient experiencing or having changes in  Anxiety: increased  Sleep: nightmares, poor  Appetite: decreased  Weight: decreased  Energy: low  Concentration: poor   Libido: no problem voiced  Irritability: yes  Motivation: fair  Guilt/hopelessness: denies  Paranoia/delusions: denies  SIB/risky behavior: denies    Review of Systems   · PSYCHIATRIC: Pertinant items are noted in the narrative.  · CONSTITUTIONAL: No weight gain or loss.  · MUSCULOSKELETAL: No pain or stiffness of the joints.  · NEUROLOGIC: No weakness, sensory changes, seizures, confusion, memory loss, tremor or other abnormal movements.  LOC (  · RESPIRATORY: No shortness of breath.  · CARDIOVASCULAR: No tachycardia or chest pain.  +palpitations.   · GASTROINTESTINAL: +vomiting (sometimes red), poor appetite, diarrhea.  +heartburn.     Past Medical, Family and Social History: The patient's past medical, family and social history have been reviewed and updated as appropriate within the electronic medical record - see encounter notes.    Compliance: " "good    Side effects: denies    Risk Parameters:  Patient reports no suicidal ideation  Patient reports no homicidal ideation  Patient reports no self-injurious behavior  Patient reports no violent behavior    Exam (detailed: at least 9 elements; comprehensive: all 15 elements)   Constitutional  Vitals:  Most recent vital signs, dated less than 90 days prior to this appointment, were reviewed.     Vitals:    08/25/22 1444   BP: 105/70   Pulse: 109   Weight: 104.5 kg (230 lb 6.4 oz)   Height: 5' 5" (1.651 m)        General:   Constitutional: No acute distress, appears stated age, casually dressed    Neurologic:   Motor: moves all extremities spontaneously and without difficulty, no abnormal involuntary movements observed  Gait: normal gait and station    Mental status examination:   Appearance: appears stated age, casually dressed, no acute distress  Behavior: unremarkable for situation, calm and cooperative  Mood: "neutral"  Affect: mood congruent and euthymic  Thought process: linear and goal directed  Associations: appropriate for conversation  Thought content: no plan or desire for self harm or harm to others, denies paranoia, no delusional ideation volunteered  Perceptions: denies hallucinations or other altered perceptions  Orientation: oriented to day of week, month, year, location and situation  Language: English, fluid  Attention: able to attend to interview  Insight: good  Judgement: good    PHQ9 8/25/2022   Total Score 8     GAD7 8/25/2022 7/28/2022 7/1/2022   1. Feeling nervous, anxious, or on edge? 3 2 3   2. Not being able to stop or control worrying? 3 1 3   3. Worrying too much about different things? 3 2 3   4. Trouble relaxing? 1 0 3   5. Being so restless that it is hard to sit still? 1 0 3   6. Becoming easily annoyed or irritable? 1 2 3   7. Feeling afraid as if something awful might happen? 1 2 3   8. If you checked off any problems, how difficult have these problems made it for you to do your " work, take care of things at home, or get along with other people? 3 1 3   FLORENCE-7 Score 13 9 21     Assessment and Diagnosis   Status/Progress: Based on the examination today, the patient's problem(s) is/are improved.  New problems have not been presented today.   Co-morbidities and Lack of compliance are not complicating management of the primary condition.       General Impression:    ICD-10-CM ICD-9-CM   1. Moderate episode of recurrent major depressive disorder  F33.1 296.32   2. FLORENCE (generalized anxiety disorder)  F41.1 300.02   3. PTSD (post-traumatic stress disorder)  F43.10 309.81     Intervention/Counseling/Treatment Plan   · Continue klonopin 0.5mg bid  · Continue trazodone 50mg nightly prn insomnia  · Continue trileptal 300mg bid  · Increase trintellix to 20mg daily  · Consider retrial of prazosin, likely will need higher dose for ideal symptom control (took 1mg nightly in the past)  · Encouraged pt to keep appt with provider at [facility] counseling  · Recommended pt discuss her current GI symptoms with PCP, pt with some concern that her trintellix is causing her GI symptoms  · CBC and CMP unremarkable, TSH wnl, UDS +cannabis and +benzo  · No need for PEC as pt is not an imminent danger to self or others or gravely disabled due to acute psychiatric illness  · Discussed that pt should either call clinic for psychiatric crisis symptoms or present to nearest emergency room    Discussed with patient informed consent including diagnosis, risks and benefits of proposed treatment above vs. alternative treatments vs. no treatment, as well as serious and common side effects of these treatments, and the inherent unpredictability of individual responses to these treatments. The patient expresses understanding of the above and displays the capacity to agree with this current plan. Patient also agrees that, currently, the benefits outweigh the risks and would like to pursue treatment at this time, and had no other  questions.    Instructions:  · Take all medications as prescribed.    · Abstain from recreational drugs and alcohol.  · Present to ED or call 911 for SI/HI plan or intent, psychosis, or medical emergency.    Return to Clinic: Follow up in about 4 weeks (around 9/22/2022).    Total time: Total time spent with patient 20 minutes with >50% spent on counseling/coordination of care.     Damian Ward MD  [facility]

## 2022-09-12 ENCOUNTER — HOSPITAL ENCOUNTER (OUTPATIENT)
Dept: RADIOLOGY | Facility: HOSPITAL | Age: 27
Discharge: HOME | End: 2022-09-12
Attending: NURSE PRACTITIONER
Payer: MEDICAID

## 2022-09-12 DIAGNOSIS — R55 NEAR SYNCOPE: ICD-10-CM

## 2022-09-12 PROBLEM — G43.709 CHRONIC MIGRAINE WITHOUT AURA WITHOUT STATUS MIGRAINOSUS, NOT INTRACTABLE: Status: ACTIVE | Noted: 2022-09-12

## 2022-09-12 PROBLEM — G43.709 CHRONIC MIGRAINE WITHOUT AURA WITHOUT STATUS MIGRAINOSUS, NOT INTRACTABLE: Chronic | Status: ACTIVE | Noted: 2022-09-12

## 2022-09-12 PROBLEM — F41.1 GAD (GENERALIZED ANXIETY DISORDER): Chronic | Status: ACTIVE | Noted: 2022-06-09

## 2022-09-12 PROBLEM — R00.2 HEART PALPITATIONS: Chronic | Status: ACTIVE | Noted: 2022-06-16

## 2022-09-12 PROBLEM — F33.1 MODERATE EPISODE OF RECURRENT MAJOR DEPRESSIVE DISORDER: Chronic | Status: ACTIVE | Noted: 2022-06-09

## 2022-09-12 PROBLEM — F43.10 PTSD (POST-TRAUMATIC STRESS DISORDER): Chronic | Status: ACTIVE | Noted: 2022-07-28

## 2022-09-12 PROCEDURE — 93880 EXTRACRANIAL BILAT STUDY: CPT

## 2022-09-12 NOTE — PROGRESS NOTES
[facility] Neurology Follow Up Office Visit Note    Initial Visit Date: 3/8/2022  Last Visit Date: 6/10/2022  Current Visit Date:  09/13/2022    Chief Complaint:     Chief Complaint   Patient presents with    Headache     F/u headaches and dizziness ,states passed out ,continue dizziness       History of Present Illness:      This is 27 y.o. female with history of Anxiety, Depression, Hyperhidrosis, Eating disorder, substance abuse, Tachycardia who is referred for dizziness, tachycardia, and worsening headache disorder due to polypharmacy. Also has over sleeping issue, which leads to worsening daytime fatigue.  During last visit, propanolol 20 mg twice a day, promethazine 25 mg twice a day as needed, and Maxalt 5 mg twice a day as needed was started. States that headache had gotten worse with Rizatriptan. States that she had been having lightheadedness, palpitations, with position change and LOC spells. Had just started on Trintellix.      Headache Frequency: 8 migraine headache days per month with daily headache.      Presenting History   Of note, patient was started on Glycopyrrolate in 11/2021 while on Hydroxyzine, Prazosin, Trazodone, Klonopin, Effexor. She noted that around 12/2021, she had one episode whereby she felt dizzy, palpitation, lightheaded, followed by LOC. She noted that since then, she would experience out of body sensation, dizziness, and palpitations during activities. She also noted worsening headaches for the past few months. She reports chronic trouble staying asleep and waking up tired. She would go to bed at around 9 - 10 pm and wake up at around 8 AM in the morning. She also notes hyperhydrosis that has been consistently getting worse in recent years, requiring her to start glyocopyrrolate.      Medications:     Current Outpatient Medications on File Prior to Visit   Medication Sig Dispense Refill    clonazePAM (KLONOPIN) 0.5 MG tablet Take 1 tablet (0.5 mg total) by mouth 2 (two) times daily. 60  tablet 2    fexofenadine-pseudoephedrine (ALLEGRA-D 24) 180-240 mg per 24 hr tablet fexofenadine-pseudoephedrine  mg-240 mg tablet,ext.release 24 hr   Take 1 tablet every day by oral route.      glycopyrrolate (ROBINUL) 1 mg Tab Take 1 mg by mouth once daily.      L norgest/e.estradioL-e.estrad (SEASONIQUE) 0.15 mg-30 mcg (84)/10 mcg (7) 3MPk Ashlyna 0.15 mg-30 mcg (84)/10 mcg(7) tablets,3 month dose pack   Take 1 tablet every day by oral route.      loratadine (CLARITIN) 10 mg tablet Take 10 mg by mouth once daily.      OXcarbazepine (TRILEPTAL) 300 MG Tab Take 1 tablet (300 mg total) by mouth 2 (two) times daily. 60 tablet 2    pantoprazole (PROTONIX) 40 MG tablet Take 1 tablet (40 mg total) by mouth once daily. (Patient not taking: Reported on 8/25/2022) 90 tablet 3    propranoloL (INDERAL) 20 MG tablet Take 1 tablet (20 mg total) by mouth 2 (two) times daily. 60 tablet 4    rizatriptan (MAXALT) 5 MG tablet Take 1 tablet (5 mg total) by mouth as needed for Migraine (every 2 hour as needed with 2 tablet in 24 hours). 9 tablet 4    traZODone (DESYREL) 50 MG tablet Take 1 tablet (50 mg total) by mouth nightly as needed for Insomnia. 30 tablet 5    vitamin D (VITAMIN D3) 1000 units Tab Take 2,000 Units by mouth once daily.      vortioxetine (TRINTELLIX) 20 mg Tab Take 1 tablet (20 mg total) by mouth Daily. 30 tablet 5     No current facility-administered medications on file prior to visit.       Labs:     Results for orders placed or performed in visit on 07/25/22   CBC Without Differential   Result Value Ref Range    WBC 8.0 4.5 - 11.5 x10(3)/mcL    RBC 5.08 4.20 - 5.40 x10(6)/mcL    Hgb 14.1 12.0 - 16.0 gm/dL    Hct 41.6 37.0 - 47.0 %    Platelet 370 130 - 400 x10(3)/mcL    MCV 81.9 80.0 - 94.0 fL    MCH 27.8 27.0 - 31.0 pg    MCHC 33.9 33.0 - 36.0 mg/dL    RDW 12.5 11.5 - 17.0 %    MPV 9.8 7.4 - 10.4 fL    NRBC% 0.0 %   Comprehensive Metabolic Panel   Result Value Ref Range    Sodium Level 138 136 - 145  mmol/L    Potassium Level 4.3 3.5 - 5.1 mmol/L    Chloride 107 98 - 107 mmol/L    Carbon Dioxide 20 (L) 22 - 29 mmol/L    Glucose Level 107 (H) 74 - 100 mg/dL    Blood Urea Nitrogen 8.6 7.0 - 18.7 mg/dL    Creatinine 1.00 0.55 - 1.02 mg/dL    Calcium Level Total 9.6 8.4 - 10.2 mg/dL    Protein Total 7.6 6.4 - 8.3 gm/dL    Albumin Level 3.9 3.5 - 5.0 gm/dL    Globulin 3.7 (H) 2.4 - 3.5 gm/dL    Albumin/Globulin Ratio 1.1 1.1 - 2.0 ratio    Bilirubin Total 0.6 <=1.5 mg/dL    Alkaline Phosphatase 83 40 - 150 unit/L    Alanine Aminotransferase 10 0 - 55 unit/L    Aspartate Aminotransferase 17 5 - 34 unit/L    Estimated GFR-Non  >60 mls/min/1.73/m2   Drug Screen, Urine   Result Value Ref Range    Amphetamines, Urine Negative Negative    Barbituates, Urine Negative Negative    Benzodiazepine, Urine Positive (A) Negative    Cannabinoids, Urine Positive (A) Negative    Cocaine, Urine Negative Negative    Fentanyl, Urine Negative Negative    MDMA, Urine Negative Negative    Opiates, Urine Negative Negative    Phencyclidine, Urine Negative Negative    pH, Urine 6.0 3.0 - 11.0    Specific Gravity, Urine Auto 1.026 1.001 - 1.035   TSH   Result Value Ref Range    Thyroid Stimulating Hormone 1.6205 0.3500 - 4.9400 uIU/mL       Studies:      - routine EEG 1/19/2022: This is a normal routine awake EEG.  - Holter 24 hrs - 42% HR in 100s with premature beats.    Review of Systems:     Review of Systems   All other systems reviewed and are negative.    Physical Exams:     Vitals:    09/13/22 1050   BP: 119/83   Pulse: 74   Resp: 18   Temp: 98.2 °F (36.8 °C)       Physical Exam  Vitals and nursing note reviewed.   Constitutional:       Appearance: Normal appearance.   HENT:      Head: Normocephalic and atraumatic.      Nose: Nose normal.      Mouth/Throat:      Mouth: Mucous membranes are moist.      Pharynx: Oropharynx is clear.   Eyes:      Conjunctiva/sclera: Conjunctivae normal.   Cardiovascular:      Rate and  Rhythm: Normal rate and regular rhythm.      Pulses: Normal pulses.   Pulmonary:      Effort: Pulmonary effort is normal.      Breath sounds: Normal breath sounds.   Abdominal:      General: Abdomen is flat.   Musculoskeletal:         General: Normal range of motion.      Cervical back: Normal range of motion.   Skin:     General: Skin is warm.   Neurological:      Mental Status: She is alert.       Comprehensive Neurological Exam:  Mental Status: Alert Oriented to Self, Date, and Place. Comprehension wnl. No dysarthria.   CN II - XII: EMMA, No APD, Fundus wnl OU, VA grossly intact to finger counting at 6 ft, VFFC, No ptosis OU, EOMI without nystagmus, LT/Temp symmetric in CN V1-3 distribution, Hearing grossly intact, Face Symmetric, Tongue and Uvula midline, Trapezius symmetric bilateral.   Motor: tone and bulk wnl throughout, no abnormal involuntary or voluntary movements, 5/5 to confrontation, Fine finger movements wnl b/l, No pronator drift.   Sensory: LT, Proprioception, Vibration, PP, Temp symmetric. No sensory simultagnosia.   Reflexes: 2+ throughout, plantar reflexes downward bilateral.   Cerebellar: FNF wnl b/l, RAHM wnl b/l  Romberg: Negative  Gait: normal.      Assessment:     This is 27 y.o. female with history of Anxiety, Depression, Hyperhidrosis, Eating disorder, Palpitations, and Tachycardia.     Problem List Items Addressed This Visit          Neuro    Chronic migraine without aura without status migrainosus, not intractable - Primary (Chronic)       Psychiatric    PTSD (post-traumatic stress disorder) (Chronic)       Palliative Care    Polypharmacy       Plan:     [] stop Propranolol 20 mg BID   [] Hold Promethazine 25 mg BID PRN for now   [] stop Maxalt 5 mg BID PRN   [] EKG today to check QT   [] Start Metoprolol ER 25 mg daily   [] Referral to [facility] for Tilt Table Testing    RTC 3 monthd     I have explained the treatment plan, diagnosis, and prognosis to patient. All questions are answered to  the best of my knowledge.     Face to face time 40 minutes, including documentation, chart review, counseling, education, review of test results, relevant medical records, and coordination of care.   I have explained the treatment plan, diagnosis, and prognosis to patient. All questions are answered to the best of my knowledge.       Daly Colon MD   General Neurology  09/13/2022

## 2022-09-13 ENCOUNTER — TELEPHONE (OUTPATIENT)
Dept: INTERNAL MEDICINE | Facility: CLINIC | Age: 27
End: 2022-09-13

## 2022-09-13 ENCOUNTER — HOSPITAL ENCOUNTER (OUTPATIENT)
Dept: CARDIOLOGY | Facility: HOSPITAL | Age: 27
Discharge: HOME | End: 2022-09-13
Attending: PSYCHIATRY & NEUROLOGY
Payer: MEDICAID

## 2022-09-13 ENCOUNTER — OFFICE VISIT (OUTPATIENT)
Dept: INTERNAL MEDICINE | Facility: CLINIC | Age: 27
End: 2022-09-13
Payer: MEDICAID

## 2022-09-13 ENCOUNTER — OFFICE VISIT (OUTPATIENT)
Dept: NEUROLOGY | Facility: CLINIC | Age: 27
End: 2022-09-13
Payer: MEDICAID

## 2022-09-13 VITALS
RESPIRATION RATE: 18 BRPM | OXYGEN SATURATION: 98 % | HEIGHT: 65 IN | DIASTOLIC BLOOD PRESSURE: 83 MMHG | HEART RATE: 74 BPM | BODY MASS INDEX: 39.56 KG/M2 | WEIGHT: 237.44 LBS | TEMPERATURE: 98 F | SYSTOLIC BLOOD PRESSURE: 119 MMHG

## 2022-09-13 VITALS
TEMPERATURE: 98 F | SYSTOLIC BLOOD PRESSURE: 111 MMHG | RESPIRATION RATE: 20 BRPM | DIASTOLIC BLOOD PRESSURE: 77 MMHG | HEIGHT: 65 IN | WEIGHT: 237.81 LBS | HEART RATE: 65 BPM | BODY MASS INDEX: 39.62 KG/M2

## 2022-09-13 DIAGNOSIS — F33.1 MODERATE EPISODE OF RECURRENT MAJOR DEPRESSIVE DISORDER: Chronic | ICD-10-CM

## 2022-09-13 DIAGNOSIS — R42 ORTHOSTATIC DIZZINESS: ICD-10-CM

## 2022-09-13 DIAGNOSIS — Z79.899 POLYPHARMACY: ICD-10-CM

## 2022-09-13 DIAGNOSIS — F43.10 PTSD (POST-TRAUMATIC STRESS DISORDER): Chronic | ICD-10-CM

## 2022-09-13 DIAGNOSIS — G43.709 CHRONIC MIGRAINE WITHOUT AURA WITHOUT STATUS MIGRAINOSUS, NOT INTRACTABLE: ICD-10-CM

## 2022-09-13 DIAGNOSIS — F32.A DEPRESSIVE DISORDER: ICD-10-CM

## 2022-09-13 DIAGNOSIS — Z00.00 WELL ADULT EXAM: ICD-10-CM

## 2022-09-13 DIAGNOSIS — E78.5 HYPERLIPIDEMIA, UNSPECIFIED HYPERLIPIDEMIA TYPE: ICD-10-CM

## 2022-09-13 DIAGNOSIS — F43.10 PTSD (POST-TRAUMATIC STRESS DISORDER): ICD-10-CM

## 2022-09-13 DIAGNOSIS — R61 HYPERHIDROSIS: ICD-10-CM

## 2022-09-13 DIAGNOSIS — G43.709 CHRONIC MIGRAINE WITHOUT AURA WITHOUT STATUS MIGRAINOSUS, NOT INTRACTABLE: Primary | ICD-10-CM

## 2022-09-13 DIAGNOSIS — F41.1 ANXIETY STATE: ICD-10-CM

## 2022-09-13 DIAGNOSIS — F41.1 GAD (GENERALIZED ANXIETY DISORDER): Chronic | ICD-10-CM

## 2022-09-13 DIAGNOSIS — K21.9 GASTROESOPHAGEAL REFLUX DISEASE, UNSPECIFIED WHETHER ESOPHAGITIS PRESENT: Primary | ICD-10-CM

## 2022-09-13 DIAGNOSIS — Z11.9 SCREENING EXAMINATION FOR INFECTIOUS DISEASE: ICD-10-CM

## 2022-09-13 LAB
LEFT CBA DIAS: 23 CM/S
LEFT CBA SYS: 125 CM/S
LEFT CCA DIST DIAS: 27 CM/S
LEFT CCA DIST SYS: 123 CM/S
LEFT CCA MID DIAS: 27 CM/S
LEFT CCA MID SYS: 131 CM/S
LEFT CCA PROX DIAS: 25 CM/S
LEFT CCA PROX SYS: 147 CM/S
LEFT ECA DIAS: 11 CM/S
LEFT ECA SYS: 111 CM/S
LEFT ICA DIST DIAS: 29 CM/S
LEFT ICA DIST SYS: 77 CM/S
LEFT ICA MID DIAS: 29 CM/S
LEFT ICA MID SYS: 75 CM/S
LEFT ICA PROX DIAS: 23 CM/S
LEFT ICA PROX SYS: 77 CM/S
LEFT VERTEBRAL DIAS: 14 CM/S
LEFT VERTEBRAL SYS: 50 CM/S
OHS CV CAROTID RIGHT ICA EDV HIGHEST: 28
OHS CV CAROTID ULTRASOUND LEFT ICA/CCA RATIO: 0.63
OHS CV CAROTID ULTRASOUND RIGHT ICA/CCA RATIO: 0.62
OHS CV PV CAROTID LEFT HIGHEST CCA: 147
OHS CV PV CAROTID LEFT HIGHEST ICA: 77
OHS CV PV CAROTID RIGHT HIGHEST CCA: 155
OHS CV PV CAROTID RIGHT HIGHEST ICA: 85
OHS CV US CAROTID LEFT HIGHEST EDV: 29
RIGHT CBA DIAS: 19 CM/S
RIGHT CBA SYS: 89 CM/S
RIGHT CCA DIST DIAS: 22 CM/S
RIGHT CCA DIST SYS: 138 CM/S
RIGHT CCA MID DIAS: 21 CM/S
RIGHT CCA MID SYS: 135 CM/S
RIGHT CCA PROX DIAS: 19 CM/S
RIGHT CCA PROX SYS: 155 CM/S
RIGHT ECA DIAS: 9 CM/S
RIGHT ECA SYS: 98 CM/S
RIGHT ICA DIST DIAS: 25 CM/S
RIGHT ICA DIST SYS: 80 CM/S
RIGHT ICA MID DIAS: 28 CM/S
RIGHT ICA MID SYS: 75 CM/S
RIGHT ICA PROX DIAS: 18 CM/S
RIGHT ICA PROX SYS: 85 CM/S
RIGHT VERTEBRAL DIAS: 15 CM/S
RIGHT VERTEBRAL SYS: 61 CM/S

## 2022-09-13 PROCEDURE — 99215 PR OFFICE/OUTPT VISIT, EST, LEVL V, 40-54 MIN: ICD-10-PCS | Mod: S$PBB,,, | Performed by: PSYCHIATRY & NEUROLOGY

## 2022-09-13 PROCEDURE — 3078F DIAST BP <80 MM HG: CPT | Mod: CPTII,,, | Performed by: NURSE PRACTITIONER

## 2022-09-13 PROCEDURE — 3074F SYST BP LT 130 MM HG: CPT | Mod: CPTII,,, | Performed by: NURSE PRACTITIONER

## 2022-09-13 PROCEDURE — 3078F PR MOST RECENT DIASTOLIC BLOOD PRESSURE < 80 MM HG: ICD-10-PCS | Mod: CPTII,,, | Performed by: NURSE PRACTITIONER

## 2022-09-13 PROCEDURE — 3079F PR MOST RECENT DIASTOLIC BLOOD PRESSURE 80-89 MM HG: ICD-10-PCS | Mod: CPTII,,, | Performed by: PSYCHIATRY & NEUROLOGY

## 2022-09-13 PROCEDURE — 1159F PR MEDICATION LIST DOCUMENTED IN MEDICAL RECORD: ICD-10-PCS | Mod: CPTII,,, | Performed by: PSYCHIATRY & NEUROLOGY

## 2022-09-13 PROCEDURE — 3008F PR BODY MASS INDEX (BMI) DOCUMENTED: ICD-10-PCS | Mod: CPTII,,, | Performed by: NURSE PRACTITIONER

## 2022-09-13 PROCEDURE — 99215 OFFICE O/P EST HI 40 MIN: CPT | Mod: 25,PBBFAC | Performed by: NURSE PRACTITIONER

## 2022-09-13 PROCEDURE — 93005 ELECTROCARDIOGRAM TRACING: CPT

## 2022-09-13 PROCEDURE — 3074F PR MOST RECENT SYSTOLIC BLOOD PRESSURE < 130 MM HG: ICD-10-PCS | Mod: CPTII,,, | Performed by: NURSE PRACTITIONER

## 2022-09-13 PROCEDURE — 1159F MED LIST DOCD IN RCRD: CPT | Mod: CPTII,,, | Performed by: PSYCHIATRY & NEUROLOGY

## 2022-09-13 PROCEDURE — 3008F BODY MASS INDEX DOCD: CPT | Mod: CPTII,,, | Performed by: PSYCHIATRY & NEUROLOGY

## 2022-09-13 PROCEDURE — 3008F PR BODY MASS INDEX (BMI) DOCUMENTED: ICD-10-PCS | Mod: CPTII,,, | Performed by: PSYCHIATRY & NEUROLOGY

## 2022-09-13 PROCEDURE — 99214 PR OFFICE/OUTPT VISIT, EST, LEVL IV, 30-39 MIN: ICD-10-PCS | Mod: S$PBB,,, | Performed by: NURSE PRACTITIONER

## 2022-09-13 PROCEDURE — 99215 OFFICE O/P EST HI 40 MIN: CPT | Mod: S$PBB,,, | Performed by: PSYCHIATRY & NEUROLOGY

## 2022-09-13 PROCEDURE — 3074F SYST BP LT 130 MM HG: CPT | Mod: CPTII,,, | Performed by: PSYCHIATRY & NEUROLOGY

## 2022-09-13 PROCEDURE — 99214 OFFICE O/P EST MOD 30 MIN: CPT | Mod: PBBFAC | Performed by: PSYCHIATRY & NEUROLOGY

## 2022-09-13 PROCEDURE — 3074F PR MOST RECENT SYSTOLIC BLOOD PRESSURE < 130 MM HG: ICD-10-PCS | Mod: CPTII,,, | Performed by: PSYCHIATRY & NEUROLOGY

## 2022-09-13 PROCEDURE — 3008F BODY MASS INDEX DOCD: CPT | Mod: CPTII,,, | Performed by: NURSE PRACTITIONER

## 2022-09-13 PROCEDURE — 99214 OFFICE O/P EST MOD 30 MIN: CPT | Mod: S$PBB,,, | Performed by: NURSE PRACTITIONER

## 2022-09-13 PROCEDURE — 3079F DIAST BP 80-89 MM HG: CPT | Mod: CPTII,,, | Performed by: PSYCHIATRY & NEUROLOGY

## 2022-09-13 RX ORDER — METOPROLOL SUCCINATE 25 MG/1
25 TABLET, EXTENDED RELEASE ORAL DAILY
Qty: 30 TABLET | Refills: 4 | Status: SHIPPED | OUTPATIENT
Start: 2022-09-13 | End: 2022-11-30 | Stop reason: SDUPTHER

## 2022-09-13 RX ORDER — PANTOPRAZOLE SODIUM 40 MG/1
40 TABLET, DELAYED RELEASE ORAL DAILY
Qty: 90 TABLET | Refills: 3 | Status: SHIPPED | OUTPATIENT
Start: 2022-09-13 | End: 2022-09-13

## 2022-09-13 RX ORDER — SUCRALFATE 1 G/1
1 TABLET ORAL
Qty: 28 TABLET | Refills: 0 | Status: SHIPPED | OUTPATIENT
Start: 2022-09-13 | End: 2022-09-20

## 2022-09-13 NOTE — ASSESSMENT & PLAN NOTE
Patient under care of Dr. Ward- continue with f/u, medications and has upcoming appointment with counselor at [facility]

## 2022-09-13 NOTE — ASSESSMENT & PLAN NOTE
Patient under care of Dr. Ward- continue with f/u, medications and has upcoming appointment with counselor at [facility]

## 2022-09-13 NOTE — ASSESSMENT & PLAN NOTE
Uncontrolled since out of Protonix in July  Taking OTC antacid from [facility]  Had bloody emesis in July  + nausea in the morning  Prior EGD with Dr. Britton several years ago- no records for review   GERD diet and precautions discussed such as:  Avoid tobacco/ alcohol, NSAID use; Avoid spicy/ acidic foods, tomato sauce, omer, caffeine, chocolate, onions  Eat smaller meals; keep HOB elevated at least 2 hours after eating  Will attempt PA for Protonix for insurance coverage; in the mean time patient needs to take Prilosec of Nexium 40 mg, famotidine 40 mg at bedtime and Carafate 1 gm QIDACHS  Referral to GI for further evaluation/ EGD  CBC today

## 2022-09-13 NOTE — ASSESSMENT & PLAN NOTE
Patient under care of Dr. Ward- continue with f/u, medications and has upcoming appointment with counselor at [facility]

## 2022-09-13 NOTE — PROGRESS NOTES
Ariela L Neyda, NP   [facility]  [address] [facility] [address]      PATIENT NAME: Salma Montero  : 1995  DATE: 22  MRN: [id number]      Billing Provider: Ariela Faye NP  Level of Service: SD OFFICE/OUTPT VISIT, EST, LEVL IV, 30-39 MIN  Patient PCP Information       Provider PCP Type    Ariela Faye NP General            Reason for Visit / Chief Complaint: bloody emesis July and Gastroesophageal Reflux (Cannot get protonix with insurance )       History of Present Illness / Problem Focused Workflow     Salma Montero presents to the clinic with bloody emesis July and Gastroesophageal Reflux (Cannot get protonix with insurance )     28 yo WF for 3 mo follow up. PMhx includes tachycardia, syncope, hyperhidrosis, anxiety/ depression, h/o eating disorder (childhood), GERD, migraine HA, chronic AR, and obesity. Reports bloody emesis that she describes as bright red blood and coffee ground emesis end of July/ beginning of August (last episode). She states this started when insurance was not paying for Protonix and was not taking anything for acid reflux. She has since started on OTC [employer] brand for antacid. Continues to endorse nausea upon awakening. Trying to follow GERD diet very carefully. Sometimes sips of water cause nausea and GI discomfort. Had prior EGD man years ago with Dr. Britton (no records for review). Appetite is fair since March when her medications were changed per mental health provider. She has since established with [facility] and Dr. Ward for mental health. Has f/u appointments with specialists. Otherwise, no other concerns, bloody stools, diarrhea or vomiting.       Review of Systems     Review of Systems   Constitutional: Negative.  Negative for activity change and unexpected weight change.   HENT: Negative.  Negative for hearing loss, rhinorrhea and trouble swallowing.    Eyes: Negative.   Negative for discharge and visual disturbance.   Respiratory: Negative.  Negative for chest tightness and wheezing.    Cardiovascular:  Positive for palpitations. Negative for chest pain.   Gastrointestinal:  Positive for nausea. Negative for blood in stool, constipation, diarrhea and vomiting.        Acid reflux  Bloody emesis July- August   Endocrine: Negative.  Negative for polydipsia and polyuria.   Genitourinary: Negative.  Negative for difficulty urinating, dysuria, hematuria and menstrual problem.   Musculoskeletal: Negative.  Negative for arthralgias, joint swelling and neck pain.   Skin: Negative.    Allergic/Immunologic: Negative.    Neurological: Negative.  Negative for headaches.   Hematological: Negative.    Psychiatric/Behavioral:  Positive for dysphoric mood. Negative for confusion.      Medical / Social / Family History     Past Medical History:   Diagnosis Date    Dizziness     Headache     Obesity     Palpitations        Past Surgical History:   Procedure Laterality Date    NISSEN FUNDOPLICATION      THROAT SURGERY      TONSILLECTOMY AND ADENOIDECTOMY      TONSILLECTOMY, ADENOIDECTOMY         Social History  Ms. Marsh  reports that she has never smoked. She has never used smokeless tobacco. She reports current alcohol use of about 2.0 standard drinks per week. She reports that she does not use drugs.    Family History  Ms. Marsh's family history includes Anxiety disorder in her maternal grandmother; Bipolar disorder in her cousin, cousin, and sister; Cancer in her maternal grandmother; Diabetes in her maternal grandfather; Heart failure in her father and maternal grandfather.    Medications and Allergies     Medications  Medication List with Changes/Refills   New Medications    METOPROLOL SUCCINATE (TOPROL-XL) 25 MG 24 HR TABLET    Take 1 tablet (25 mg total) by mouth once daily.    SUCRALFATE (CARAFATE) 1 GRAM TABLET    Take 1 tablet (1 g total) by mouth 4 (four) times daily before meals and  nightly. Take on empty stomach for 7 days   Current Medications    CLONAZEPAM (KLONOPIN) 0.5 MG TABLET    Take 1 tablet (0.5 mg total) by mouth 2 (two) times daily.    FEXOFENADINE-PSEUDOEPHEDRINE (ALLEGRA-D 24) 180-240 MG PER 24 HR TABLET    fexofenadine-pseudoephedrine  mg-240 mg tablet,ext.release 24 hr   Take 1 tablet every day by oral route.    GLYCOPYRROLATE (ROBINUL) 1 MG TAB    Take 1 mg by mouth once daily.    L NORGEST/E.ESTRADIOL-E.ESTRAD (SEASONIQUE) 0.15 MG-30 MCG (84)/10 MCG (7) 3MPK    Ashlyna 0.15 mg-30 mcg (84)/10 mcg(7) tablets,3 month dose pack   Take 1 tablet every day by oral route.    LORATADINE (CLARITIN) 10 MG TABLET    Take 10 mg by mouth once daily.    OXCARBAZEPINE (TRILEPTAL) 300 MG TAB    Take 1 tablet (300 mg total) by mouth 2 (two) times daily.    TRAZODONE (DESYREL) 50 MG TABLET    Take 1 tablet (50 mg total) by mouth nightly as needed for Insomnia.    VITAMIN D (VITAMIN D3) 1000 UNITS TAB    Take 2,000 Units by mouth once daily.    VORTIOXETINE (TRINTELLIX) 20 MG TAB    Take 1 tablet (20 mg total) by mouth Daily.   Discontinued Medications    PANTOPRAZOLE (PROTONIX) 40 MG TABLET    Take 1 tablet (40 mg total) by mouth once daily.       Allergies  Review of patient's allergies indicates:   Allergen Reactions    Corn containing products Anaphylaxis    Corn meal-luffa cylindrica frt Anaphylaxis, Hives, Itching and Rash    Orange Hives and Itching    Sulfur     Sulfa (sulfonamide antibiotics) Rash, Hives and Itching       Physical Examination     Vitals:    09/13/22 0923   BP: 111/77   Pulse: 65   Resp: 20   Temp: 98.3 °F (36.8 °C)     Physical Exam  Vitals and nursing note reviewed.   Constitutional:       Appearance: Normal appearance. She is not ill-appearing.   HENT:      Head: Normocephalic.      Right Ear: Tympanic membrane normal.      Left Ear: Tympanic membrane normal.      Nose: Nose normal.      Mouth/Throat:      Mouth: Mucous membranes are moist.   Eyes:       Extraocular Movements: Extraocular movements intact.      Conjunctiva/sclera: Conjunctivae normal.      Pupils: Pupils are equal, round, and reactive to light.   Cardiovascular:      Rate and Rhythm: Normal rate and regular rhythm.      Pulses: Normal pulses.   Pulmonary:      Effort: Pulmonary effort is normal. No respiratory distress.      Breath sounds: Normal breath sounds.   Abdominal:      General: Bowel sounds are normal. There is no distension.      Palpations: Abdomen is soft. There is no mass.      Tenderness: There is no abdominal tenderness.      Hernia: No hernia is present.   Musculoskeletal:         General: Normal range of motion.      Cervical back: Normal range of motion and neck supple.      Right lower leg: No edema.      Left lower leg: No edema.   Skin:     General: Skin is warm and dry.      Capillary Refill: Capillary refill takes less than 2 seconds.   Neurological:      Mental Status: She is alert and oriented to person, place, and time. Mental status is at baseline.      Motor: No weakness.   Psychiatric:         Mood and Affect: Mood normal.         Behavior: Behavior normal.         Thought Content: Thought content normal.         Judgment: Judgment normal.         Results       Assessment and Plan (including Health Maintenance)     Plan:         Health Maintenance Due   Topic Date Due    Hepatitis C Screening  Never done    Pap Smear  Never done    COVID-19 Vaccine (3 - Booster for Pfizer series) 11/02/2021    Influenza Vaccine (1) 09/01/2022       Problem List Items Addressed This Visit          Psychiatric    FLORENCE (generalized anxiety disorder) (Chronic)    Current Assessment & Plan     Patient under care of Dr. Ward- continue with f/u, medications and has upcoming appointment with counselor at [facility]         Moderate episode of recurrent major depressive disorder (Chronic)    Current Assessment & Plan     Patient under care of Dr. Ward- continue with f/u, medications and has  upcoming appointment with counselor at [facility]         PTSD (post-traumatic stress disorder) (Chronic)    Current Assessment & Plan     Patient under care of Dr. Ward- continue with f/u, medications and has upcoming appointment with counselor at [facility]            GI    Gastroesophageal reflux disease - Primary    Current Assessment & Plan     Uncontrolled since out of Protonix in July  Taking OTC antacid from [employer]  Had bloody emesis in July  + nausea in the morning  Prior EGD with Dr. Britton several years ago- no records for review   GERD diet and precautions discussed such as:  Avoid tobacco/ alcohol, NSAID use; Avoid spicy/ acidic foods, tomato sauce, omer, caffeine, chocolate, onions  Eat smaller meals; keep HOB elevated at least 2 hours after eating  Will attempt PA for Protonix for insurance coverage; in the mean time patient needs to take Prilosec of Nexium 40 mg, famotidine 40 mg at bedtime and Carafate 1 gm QIDACHS  Referral to GI for further evaluation/ EGD  CBC today           Relevant Medications    sucralfate (CARAFATE) 1 gram tablet    Other Relevant Orders    Ambulatory referral/consult to Gastroenterology    CBC Auto Differential (Completed)    CBC Auto Differential     Other Visit Diagnoses       Well adult exam        Relevant Orders    CBC Auto Differential    Comprehensive Metabolic Panel    Hemoglobin A1C    Lipid Panel    TSH    Hyperlipidemia, unspecified hyperlipidemia type        Relevant Orders    CBC Auto Differential    Comprehensive Metabolic Panel    Screening examination for infectious disease        Relevant Orders    Hepatitis Panel, Acute    HIV 1/2 Ag/Ab (4th Gen)    SYPHILIS ANTIBODY (WITH REFLEX RPR)    Chlamydia/GC, PCR            Health Maintenance Topics with due status: Not Due       Topic Last Completion Date    TETANUS VACCINE 11/10/2020       Future Appointments   Date Time Provider Department Center   9/21/2022  2:00 PM HANNAH Massey [facility] CARD [place]    9/27/2022  2:30 PM Damian Ward MD [facility]   12/13/2022  9:45 AM Daly Colon MD [facility] NEURO [place]   12/14/2022 12:45 PM [facility]       Follow up in 3 months virtual audio with labs.       Signature:  Ariela Faye NP  [facility]  [address] [facility] [address]    Date of encounter: 9/13/22      Answers submitted by the patient for this visit:  Review of Systems Questionnaire (Submitted on 9/6/2022)  activity change: No  unexpected weight change: No  neck pain: No  hearing loss: No  rhinorrhea: No  trouble swallowing: No  eye discharge: No  visual disturbance: No  chest tightness: No  wheezing: No  chest pain: No  palpitations: Yes  blood in stool: No  constipation: No  vomiting: No  diarrhea: No  polydipsia: No  polyuria: No  difficulty urinating: No  hematuria: No  menstrual problem: No  dysuria: No  joint swelling: No  arthralgias: No  headaches: No  confusion: No  dysphoric mood: Yes

## 2022-09-15 ENCOUNTER — TELEPHONE (OUTPATIENT)
Dept: INTERNAL MEDICINE | Facility: CLINIC | Age: 27
End: 2022-09-15
Payer: MEDICAID

## 2022-09-15 DIAGNOSIS — K21.01 GASTROESOPHAGEAL REFLUX DISEASE WITH ESOPHAGITIS AND HEMORRHAGE: Primary | ICD-10-CM

## 2022-09-15 RX ORDER — PANTOPRAZOLE SODIUM 40 MG/1
40 TABLET, DELAYED RELEASE ORAL DAILY
Qty: 90 TABLET | Refills: 3 | Status: SHIPPED | OUTPATIENT
Start: 2022-09-15 | End: 2023-08-08

## 2022-09-21 ENCOUNTER — OFFICE VISIT (OUTPATIENT)
Dept: CARDIOLOGY | Facility: CLINIC | Age: 27
End: 2022-09-21
Payer: MEDICAID

## 2022-09-21 ENCOUNTER — DOCUMENTATION ONLY (OUTPATIENT)
Dept: CARDIOLOGY | Facility: CLINIC | Age: 27
End: 2022-09-21

## 2022-09-21 VITALS
HEART RATE: 104 BPM | HEIGHT: 66 IN | TEMPERATURE: 98 F | BODY MASS INDEX: 37.99 KG/M2 | SYSTOLIC BLOOD PRESSURE: 114 MMHG | OXYGEN SATURATION: 98 % | RESPIRATION RATE: 17 BRPM | DIASTOLIC BLOOD PRESSURE: 78 MMHG | WEIGHT: 236.38 LBS

## 2022-09-21 DIAGNOSIS — R00.2 HEART PALPITATIONS: Primary | Chronic | ICD-10-CM

## 2022-09-21 DIAGNOSIS — R55 SYNCOPE, UNSPECIFIED SYNCOPE TYPE: ICD-10-CM

## 2022-09-21 PROCEDURE — 3074F PR MOST RECENT SYSTOLIC BLOOD PRESSURE < 130 MM HG: ICD-10-PCS | Mod: CPTII,,, | Performed by: NURSE PRACTITIONER

## 2022-09-21 PROCEDURE — 99214 PR OFFICE/OUTPT VISIT, EST, LEVL IV, 30-39 MIN: ICD-10-PCS | Mod: S$PBB,,, | Performed by: NURSE PRACTITIONER

## 2022-09-21 PROCEDURE — 3008F BODY MASS INDEX DOCD: CPT | Mod: CPTII,,, | Performed by: NURSE PRACTITIONER

## 2022-09-21 PROCEDURE — 99215 OFFICE O/P EST HI 40 MIN: CPT | Mod: PBBFAC | Performed by: NURSE PRACTITIONER

## 2022-09-21 PROCEDURE — 3078F PR MOST RECENT DIASTOLIC BLOOD PRESSURE < 80 MM HG: ICD-10-PCS | Mod: CPTII,,, | Performed by: NURSE PRACTITIONER

## 2022-09-21 PROCEDURE — 1160F PR REVIEW ALL MEDS BY PRESCRIBER/CLIN PHARMACIST DOCUMENTED: ICD-10-PCS | Mod: CPTII,,, | Performed by: NURSE PRACTITIONER

## 2022-09-21 PROCEDURE — 1159F MED LIST DOCD IN RCRD: CPT | Mod: CPTII,,, | Performed by: NURSE PRACTITIONER

## 2022-09-21 PROCEDURE — 3078F DIAST BP <80 MM HG: CPT | Mod: CPTII,,, | Performed by: NURSE PRACTITIONER

## 2022-09-21 PROCEDURE — 3008F PR BODY MASS INDEX (BMI) DOCUMENTED: ICD-10-PCS | Mod: CPTII,,, | Performed by: NURSE PRACTITIONER

## 2022-09-21 PROCEDURE — 1160F RVW MEDS BY RX/DR IN RCRD: CPT | Mod: CPTII,,, | Performed by: NURSE PRACTITIONER

## 2022-09-21 PROCEDURE — 3074F SYST BP LT 130 MM HG: CPT | Mod: CPTII,,, | Performed by: NURSE PRACTITIONER

## 2022-09-21 PROCEDURE — 99214 OFFICE O/P EST MOD 30 MIN: CPT | Mod: S$PBB,,, | Performed by: NURSE PRACTITIONER

## 2022-09-21 PROCEDURE — 1159F PR MEDICATION LIST DOCUMENTED IN MEDICAL RECORD: ICD-10-PCS | Mod: CPTII,,, | Performed by: NURSE PRACTITIONER

## 2022-09-21 NOTE — PROGRESS NOTES
Please excuse patient from jury duty on 10.3.22. The patient has scheduled cardiac diagnostic testing during this time for further evaluation of syncopal episodes.     Thank you   [facility]

## 2022-09-21 NOTE — PROGRESS NOTES
"        CHIEF COMPLAINT:   Chief Complaint   Patient presents with    Follow-up    Dizziness    Palpitations    Shortness of Breath     Patient here for 2 mth f/u. Pt c/o dizziness w position change. Pt stated she fainted when going from sitting on the bed to a standing position. Pt c/o palpitations daily. Pt c/o sob while walking. Pt c/o yifan knees swelling "at times." Pt denies cp. Questions.                    Review of patient's allergies indicates:   Allergen Reactions    Corn containing products Anaphylaxis    Corn meal-luffa cylindrica frt Anaphylaxis, Hives, Itching and Rash    Orange Hives and Itching    Sulfur     Sulfa (sulfonamide antibiotics) Rash, Hives and Itching                                          HPI:  Salma Montero 27 y.o. female with anxiety, depression and GERD who presents for routine follow-up on results of testing.  The patient was originally referred to [facility] as an initial referral palpitations and syncope.  Of note, the patient endorses a long standing history of palpitations since age 14, but reports progression of symptoms with associated chest pressure, dizziness, and syncope. She completed an echocardiogram on 03/15/2022 that revealed an intact left ventricular ejection fraction that measured approximately 55% and no significant valvular abnormalities.(see below).  24 hour Holter monitor in [facility] 2022 revealed sinus rhythm, average ventricular rate is elevated 100.  Patient was noted to be tachycardiac 42% of the time.  There were rare PACs and PVCs.  No atrial fibrillation, SVT or VT noted. The patient completed a 2 week event monitor that revealed the patient was in sinus rhythm with average heart rate is 78 bpm, minimal 56 bpm and maximum 134 bpm.  There were no significant pauses, PVCs, VT, or AFib.  There was 1 run SVT for 4 beats. She completed a treadmill stress test on 06/29/2022 in which the Pineda treadmill score indicated low risk for cardiovascular " events.  She also completed a carotid ultrasound on 9.12.22 that demonstrated no hemodynamically significant stenosis to bilateral internal carotid arteries.  The patient reported symptoms during sinus rhythm.  Today the patient endorses ongoing palpitations and intermittent syncopal episodes.  She reports that she has had 2-3 episodes of syncope since last seen.  Latest episode occurring approximately a week ago.  Patient reports that she was sitting in bed in which she began to have tunnel vision, with associated numbness from head to toe and a tingling sensation.  She describes it as a static feeling.  She denies any precipitating chest pain, orthopnea, or PND.  Of note, the patient has also been followed by Neurology and had a normal EEG in January of 2022.  She reports that she has upcoming tilt table test scheduled for October                                                                                                                                                                                                                                                                                                                                                                                                                                                                                      CARDIAC TESTING:  No results found for this or any previous visit.    Results for orders placed during the hospital encounter of 06/29/22    Exercise Stress - EKG    Interpretation Summary    The EKG portion of this study is negative for ischemia.    The patient reported no chest pain during the stress test.    The blood pressure response to stress was normal.    There were no arrhythmias during stress.    Minutes exercised:  5  - ST deviation:  0 mm  Angina:  no angina (0)  - Angina index:  0  Duke treadmill score:  5    Patient has Duke Treadmill Score = 5.  This indicates low risk for cardiovascular events (predicted 4 year  survival is 99%    ECHO 3.15.22  Left ventricular ejection fraction measured approximately 55%  Structurally normal mitral valve  Structurally normal trileaflet aortic valve  Tricuspid valve is structurally normal  Trace tricuspid regurgitation  Pulmonary arterial systolic pressure is estimated to be normal  Pulmonic valve is structurally normal  Mild pulmonic regurgitation present  Normal size left atrium  Number right atrial size  Normal right ventricular chamber size and function                                                                                            Patient Active Problem List   Diagnosis    Hyperhidrosis    Insomnia    Migraine headache    Tinnitus    Obesity    Moderate episode of recurrent major depressive disorder    FLORENCE (generalized anxiety disorder)    Vitamin D deficiency    Allergic rhinitis    Elevated low density lipoprotein (LDL) cholesterol level    Gastroesophageal reflux disease    Heart palpitations    Syncope    PTSD (post-traumatic stress disorder)    Chronic migraine without aura without status migrainosus, not intractable    Orthostatic dizziness     Past Surgical History:   Procedure Laterality Date    NISSEN FUNDOPLICATION      THROAT SURGERY      TONSILLECTOMY AND ADENOIDECTOMY      TONSILLECTOMY, ADENOIDECTOMY       Social History     Socioeconomic History    Marital status:    Tobacco Use    Smoking status: Never    Smokeless tobacco: Never   Substance and Sexual Activity    Alcohol use: Yes     Alcohol/week: 2.0 standard drinks     Types: 2 Shots of liquor per week     Comment: once a month    Drug use: Never    Sexual activity: Yes     Partners: Male     Social Determinants of Health     Physical Activity: Inactive    Days of Exercise per Week: 0 days    Minutes of Exercise per Session: 0 min        Family History   Problem Relation Age of Onset    Heart failure Father     Bipolar disorder Sister     Cancer Maternal Grandmother     Anxiety disorder Maternal  Grandmother     Heart failure Maternal Grandfather     Diabetes Maternal Grandfather     Bipolar disorder Cousin     Bipolar disorder Cousin          Current Outpatient Medications:     clonazePAM (KLONOPIN) 0.5 MG tablet, Take 1 tablet (0.5 mg total) by mouth 2 (two) times daily., Disp: 60 tablet, Rfl: 2    fexofenadine-pseudoephedrine (ALLEGRA-D 24) 180-240 mg per 24 hr tablet, fexofenadine-pseudoephedrine  mg-240 mg tablet,ext.release 24 hr  Take 1 tablet every day by oral route., Disp: , Rfl:     glycopyrrolate (ROBINUL) 1 mg Tab, Take 1 mg by mouth once daily., Disp: , Rfl:     L norgest/e.estradioL-e.estrad (SEASONIQUE) 0.15 mg-30 mcg (84)/10 mcg (7) 3MPk, Ashlyna 0.15 mg-30 mcg (84)/10 mcg(7) tablets,3 month dose pack  Take 1 tablet every day by oral route., Disp: , Rfl:     loratadine (CLARITIN) 10 mg tablet, Take 10 mg by mouth once daily., Disp: , Rfl:     metoprolol succinate (TOPROL-XL) 25 MG 24 hr tablet, Take 1 tablet (25 mg total) by mouth once daily., Disp: 30 tablet, Rfl: 4    OXcarbazepine (TRILEPTAL) 300 MG Tab, Take 1 tablet (300 mg total) by mouth 2 (two) times daily., Disp: 60 tablet, Rfl: 2    pantoprazole (PROTONIX) 40 MG tablet, Take 1 tablet (40 mg total) by mouth once daily., Disp: 90 tablet, Rfl: 3    traZODone (DESYREL) 50 MG tablet, Take 1 tablet (50 mg total) by mouth nightly as needed for Insomnia., Disp: 30 tablet, Rfl: 5    vitamin D (VITAMIN D3) 1000 units Tab, Take 2,000 Units by mouth once daily., Disp: , Rfl:     vortioxetine (TRINTELLIX) 20 mg Tab, Take 1 tablet (20 mg total) by mouth Daily., Disp: 30 tablet, Rfl: 5     ROS:                                                                                                                                                                             Review of Systems   Constitutional: Negative.    HENT: Negative.     Eyes: Negative.    Respiratory: Negative.  Negative for shortness of breath.    Cardiovascular:  Positive for  "palpitations.   Gastrointestinal: Negative.    Genitourinary: Negative.    Musculoskeletal: Negative.    Skin: Negative.    Neurological: Negative.         Syncope   Endo/Heme/Allergies: Negative.    Psychiatric/Behavioral: Negative.        Blood pressure 114/78, pulse 104, temperature 98.4 °F (36.9 °C), resp. rate 17, height 5' 6" (1.676 m), weight 107.2 kg (236 lb 6.4 oz), last menstrual period 08/17/2022, SpO2 98 %.   PE:  Physical Exam  HENT:      Head: Normocephalic.      Nose: Nose normal.   Eyes:      Pupils: Pupils are equal, round, and reactive to light.   Cardiovascular:      Rate and Rhythm: Normal rate and regular rhythm.   Pulmonary:      Effort: Pulmonary effort is normal.   Abdominal:      General: Abdomen is flat. Bowel sounds are normal.      Palpations: Abdomen is soft.   Musculoskeletal:         General: Normal range of motion.      Cervical back: Normal range of motion.   Skin:     General: Skin is warm.   Neurological:      General: No focal deficit present.      Mental Status: She is alert.   Psychiatric:         Mood and Affect: Mood normal.              ASSESSMENT/PLAN:  Palpitations/Tachycardia   - Reports continued palpitations without improvement.  - LVEF- 55% with significant valvular abnormalities (March 2022)  - Holter monitor- sinus rhythm, average ventricular rate is elevated 100.  Patient was noted to be tachycardiac 42% of the time.  There were rare PACs and PVCs.  No atrial fibrillation, SVT or VT noted (Feb. 2022)  - Reports recently switched from propranolol to metoprolol succinate due to continued palpitations.  Patient would like to hold off on med adjustment at this time because she recent started on the medication.    - Will schedule a nurse visit in 4 weeks.  If patient remains tachycardic with palpitations, will plan to increase metoprolol succinate to 50 mg   - instructed to avoid caffeine and remain properly hydrated.       Syncope   - April 2022, June 30th. Last " September 17th  (see HPI)  - 2 week event monitor for further evaluation - SR with average heart rate is 78 bpm, minimal 56 bpm and maximum 134 bpm.  There were no significant pauses, PVCs, VT, or AFib.  There was 1 run SVT for 4 beats  - Holter monitor - (see above)  - EEG -normal (Jan. 2022)  - TST -  low risk for cardiovascular events, EKG portion of study is negative for ischemia (6.29.22)  - Carotid US - no hemodynamically significant stenosis of bilateral internal carotid arteries (9.12.22)  - Strict ED precautions provided. Denies any precipiating symptoms   - Has scheduled upcoming tilt table scheduled in October                Nurse visit in 4 weeks for BP/HR   Follow up in cardiology clinic in two months after tilt table testing completed   Follow up with PCP/neurology as directed

## 2022-09-21 NOTE — PATIENT INSTRUCTIONS
Nurse visit in 4 weeks for BP/HR   Follow up in cardiology clinic in two months after tilt table testing completed   Follow up with PCP/neurology as directed

## 2022-09-27 ENCOUNTER — OFFICE VISIT (OUTPATIENT)
Dept: BEHAVIORAL HEALTH | Facility: CLINIC | Age: 27
End: 2022-09-27
Payer: MEDICAID

## 2022-09-27 VITALS
WEIGHT: 233.13 LBS | HEIGHT: 66 IN | HEART RATE: 98 BPM | RESPIRATION RATE: 20 BRPM | DIASTOLIC BLOOD PRESSURE: 82 MMHG | BODY MASS INDEX: 37.47 KG/M2 | SYSTOLIC BLOOD PRESSURE: 126 MMHG | OXYGEN SATURATION: 97 %

## 2022-09-27 DIAGNOSIS — F41.1 GAD (GENERALIZED ANXIETY DISORDER): Chronic | ICD-10-CM

## 2022-09-27 DIAGNOSIS — F33.1 MODERATE EPISODE OF RECURRENT MAJOR DEPRESSIVE DISORDER: Primary | Chronic | ICD-10-CM

## 2022-09-27 DIAGNOSIS — F43.10 PTSD (POST-TRAUMATIC STRESS DISORDER): Chronic | ICD-10-CM

## 2022-09-27 PROCEDURE — 3079F PR MOST RECENT DIASTOLIC BLOOD PRESSURE 80-89 MM HG: ICD-10-PCS | Mod: CPTII,,, | Performed by: STUDENT IN AN ORGANIZED HEALTH CARE EDUCATION/TRAINING PROGRAM

## 2022-09-27 PROCEDURE — 3008F PR BODY MASS INDEX (BMI) DOCUMENTED: ICD-10-PCS | Mod: CPTII,,, | Performed by: STUDENT IN AN ORGANIZED HEALTH CARE EDUCATION/TRAINING PROGRAM

## 2022-09-27 PROCEDURE — 1159F MED LIST DOCD IN RCRD: CPT | Mod: CPTII,,, | Performed by: STUDENT IN AN ORGANIZED HEALTH CARE EDUCATION/TRAINING PROGRAM

## 2022-09-27 PROCEDURE — 1160F PR REVIEW ALL MEDS BY PRESCRIBER/CLIN PHARMACIST DOCUMENTED: ICD-10-PCS | Mod: CPTII,,, | Performed by: STUDENT IN AN ORGANIZED HEALTH CARE EDUCATION/TRAINING PROGRAM

## 2022-09-27 PROCEDURE — 3079F DIAST BP 80-89 MM HG: CPT | Mod: CPTII,,, | Performed by: STUDENT IN AN ORGANIZED HEALTH CARE EDUCATION/TRAINING PROGRAM

## 2022-09-27 PROCEDURE — 3008F BODY MASS INDEX DOCD: CPT | Mod: CPTII,,, | Performed by: STUDENT IN AN ORGANIZED HEALTH CARE EDUCATION/TRAINING PROGRAM

## 2022-09-27 PROCEDURE — 99214 PR OFFICE/OUTPT VISIT, EST, LEVL IV, 30-39 MIN: ICD-10-PCS | Mod: AF,HB,S$PBB, | Performed by: STUDENT IN AN ORGANIZED HEALTH CARE EDUCATION/TRAINING PROGRAM

## 2022-09-27 PROCEDURE — 90833 PSYTX W PT W E/M 30 MIN: CPT | Mod: AF,HB,S$PBB, | Performed by: STUDENT IN AN ORGANIZED HEALTH CARE EDUCATION/TRAINING PROGRAM

## 2022-09-27 PROCEDURE — 99214 OFFICE O/P EST MOD 30 MIN: CPT | Mod: AF,HB,S$PBB, | Performed by: STUDENT IN AN ORGANIZED HEALTH CARE EDUCATION/TRAINING PROGRAM

## 2022-09-27 PROCEDURE — 3074F PR MOST RECENT SYSTOLIC BLOOD PRESSURE < 130 MM HG: ICD-10-PCS | Mod: CPTII,,, | Performed by: STUDENT IN AN ORGANIZED HEALTH CARE EDUCATION/TRAINING PROGRAM

## 2022-09-27 PROCEDURE — 1160F RVW MEDS BY RX/DR IN RCRD: CPT | Mod: CPTII,,, | Performed by: STUDENT IN AN ORGANIZED HEALTH CARE EDUCATION/TRAINING PROGRAM

## 2022-09-27 PROCEDURE — 90833 PR PSYCHOTHERAPY W/PATIENT W/E&M, 30 MIN (ADD ON): ICD-10-PCS | Mod: AF,HB,S$PBB, | Performed by: STUDENT IN AN ORGANIZED HEALTH CARE EDUCATION/TRAINING PROGRAM

## 2022-09-27 PROCEDURE — 99213 OFFICE O/P EST LOW 20 MIN: CPT | Mod: PBBFAC,PN | Performed by: STUDENT IN AN ORGANIZED HEALTH CARE EDUCATION/TRAINING PROGRAM

## 2022-09-27 PROCEDURE — 1159F PR MEDICATION LIST DOCUMENTED IN MEDICAL RECORD: ICD-10-PCS | Mod: CPTII,,, | Performed by: STUDENT IN AN ORGANIZED HEALTH CARE EDUCATION/TRAINING PROGRAM

## 2022-09-27 PROCEDURE — 3074F SYST BP LT 130 MM HG: CPT | Mod: CPTII,,, | Performed by: STUDENT IN AN ORGANIZED HEALTH CARE EDUCATION/TRAINING PROGRAM

## 2022-09-27 RX ORDER — CLONAZEPAM 0.5 MG/1
0.5 TABLET ORAL 2 TIMES DAILY
Qty: 60 TABLET | Refills: 2 | Status: SHIPPED | OUTPATIENT
Start: 2022-09-27 | End: 2022-12-22 | Stop reason: SDUPTHER

## 2022-09-27 RX ORDER — PRAZOSIN HYDROCHLORIDE 2 MG/1
2 CAPSULE ORAL 2 TIMES DAILY
Qty: 30 CAPSULE | Refills: 5 | Status: SHIPPED | OUTPATIENT
Start: 2022-09-27 | End: 2022-11-07 | Stop reason: SDUPTHER

## 2022-09-27 RX ORDER — OXCARBAZEPINE 300 MG/1
300 TABLET, FILM COATED ORAL 2 TIMES DAILY
Qty: 60 TABLET | Refills: 5 | Status: SHIPPED | OUTPATIENT
Start: 2022-09-27 | End: 2023-02-06 | Stop reason: SDUPTHER

## 2022-09-27 NOTE — PROGRESS NOTES
"Outpatient Psychiatry Follow-Up Visit    9/27/2022    Clinical Status of Patient:  Outpatient (Ambulatory)    Chief Complaint:  Salma Montero is a 27 y.o. female who presents today for follow-up of MDD, FLORENCE, PTSD, insomnia. Patient last seen for initial evaluation on 7/1/2022. Met with patient.      Interval History and Content of Current Session:  Interim Events/Subjective Report/Content of Current Session:   Pt reports doing poorly since last visit.  Reports that she has continued to experience somatic symptoms (LOC, palpitations), neurological and cardiac workup has been thus far unrevealing.  Also notes continue GI distress.  Pt reports that she was told that she is being overmedicated by her provider and that mental health concerns were probably the cause of her symptoms, pt voiced that she found this very upsetting.  Pt notes mood has been poor, anxiety elevated.  Sleep poor 2/2 nightmares.  Appetite low, continues to lose weight.  Notes hypervigilance.  Endorses some suicidal thinking, denies any specific plan or intention.  Says 0% chance of acting on suicidal thinking in next few weeks.  Denies HI.  Endorses VH of "seeing things in the corner of my eye."  Denies SE from current regimen, feels that her regimen is helpful.  Wants to adjust regimen to address ongoing symptoms.     Psychiatric Review of Systems-is patient experiencing or having changes in  Anxiety: increased  Sleep: nightmares, poor  Appetite: low  Weight: decreased  Energy: low  Concentration: poor   Libido: no problem voiced  Irritability: yes  Motivation: fair  Guilt/hopelessness: denies  Paranoia/delusions: denies  SIB/risky behavior: denies    Review of Systems   PSYCHIATRIC: Pertinant items are noted in the narrative.  CONSTITUTIONAL: No weight gain or loss.  MUSCULOSKELETAL: No pain or stiffness of the joints.  NEUROLOGIC: No weakness, sensory changes, seizures, confusion, memory loss, tremor or other abnormal movements.  +LOC. " "  RESPIRATORY: No shortness of breath.  CARDIOVASCULAR: No tachycardia or chest pain.  +palpitations.   GASTROINTESTINAL: +vomiting (sometimes red), poor appetite, diarrhea.  +heartburn.     Past Medical, Family and Social History: The patient's past medical, family and social history have been reviewed and updated as appropriate within the electronic medical record - see encounter notes.    Compliance: good    Side effects: denies    Risk Parameters:  Patient reports no suicidal ideation  Patient reports no homicidal ideation  Patient reports no self-injurious behavior  Patient reports no violent behavior    Exam (detailed: at least 9 elements; comprehensive: all 15 elements)   Constitutional  Vitals:  Most recent vital signs, dated less than 90 days prior to this appointment, were reviewed.     Vitals:    09/27/22 1428   BP: 126/82   Pulse: 98   Resp: 20   SpO2: 97%   Weight: 105.7 kg (233 lb 1.6 oz)   Height: 5' 6" (1.676 m)        General:   Constitutional: No acute distress, appears stated age, casually dressed    Neurologic:   Motor: moves all extremities spontaneously and without difficulty, no abnormal involuntary movements observed  Gait: normal gait and station    Mental status examination:   Appearance: appears stated age, casually dressed, no acute distress  Behavior: unremarkable for situation, calm and cooperative  Mood: "bad"  Affect: mood congruent, dysphoric, tearful throughout, irritable  Thought process: linear and goal directed  Associations: appropriate for conversation  Thought content: no plan or desire for self harm or harm to others, denies paranoia, no delusional ideation volunteered  Perceptions: denies hallucinations or other altered perceptions  Orientation: oriented to day of week, month, year, location and situation  Language: English, fluid  Attention: able to attend to interview  Insight: good  Judgement: good    PHQ9 9/27/2022   Total Score 24     GAD7 9/27/2022 8/25/2022 7/28/2022 "   1. Feeling nervous, anxious, or on edge? 3 3 2   2. Not being able to stop or control worrying? 3 3 1   3. Worrying too much about different things? 3 3 2   4. Trouble relaxing? 3 1 0   5. Being so restless that it is hard to sit still? 3 1 0   6. Becoming easily annoyed or irritable? 3 1 2   7. Feeling afraid as if something awful might happen? 3 1 2   8. If you checked off any problems, how difficult have these problems made it for you to do your work, take care of things at home, or get along with other people? 3 3 1   FLORENCE-7 Score 21 13 9     PSYCHOTHERAPY ADD-ON +[id number]   16-37 minutes    Site: [facility]  Time: 20 minutes  Participants: Met with patient    Therapeutic Intervention Type: supportive psychotherapy  Why chosen therapy is appropriate versus another modality: relevant to diagnosis    Target symptoms: depression, anxiety   Primary focus: anxiety and depression due to lack of medical explanation for somatic symptoms, h/o trauma  Psychotherapeutic techniques: Discussed recent medical events.  Utilized reflective listening and empathic communication techniques.  Discussed ongoing psychiatric symptoms, particularly hypervigilance and nightmares.  Discussed likely cause is trauma.  Provided psychoeducation on trauma and PTSD.  Discussed treatment options.  Challenged some of pt's beliefs (especially self blame related to trauma).  Discussed available crisis support resources.     Outcome monitoring methods: self-report, checklist/rating scale    Patient's response to intervention:  The patient's response to intervention is accepting.    Progress toward goals:  The patient's progress toward goals is fair .    Assessment and Diagnosis   Status/Progress: Based on the examination today, the patient's problem(s) is/are worsening.  New problems have not been presented today.   Co-morbidities and Lack of compliance are not complicating management of the primary condition.       General  Impression:    ICD-10-CM ICD-9-CM   1. Moderate episode of recurrent major depressive disorder  F33.1 296.32   2. FLORENCE (generalized anxiety disorder)  F41.1 300.02   3. PTSD (post-traumatic stress disorder)  F43.10 309.81     - R/o cluster B personality    Intervention/Counseling/Treatment Plan   Will work to optimize/simplify pt's regimen in the future, but will try to achieve good control of intrusive memories/nightmares/hypervigilance first  Continue klonopin 0.5mg bid  Continue trazodone 50mg nightly prn insomnia  Continue trileptal 300mg bid  Continue trintellix 20mg daily  Start prazosin 2mg nightly, consider uptitrating as needed for good symptom relief  Encouraged pt to reschedule appt with provider at [facility]  Recommended pt continue to follow up with other providers  CBC and CMP unremarkable, TSH wnl, UDS +cannabis and +benzo  Discussed option for IOP/voluntary admission if needed, discussed that pt should call clinic if her symptoms worsen  Will plan for close follow up in the next few weeks, low threshold to escalate level of care  No need for PEC as pt is not an imminent danger to self or others or gravely disabled due to acute psychiatric illness  Discussed that pt should either call clinic for psychiatric crisis symptoms or present to nearest emergency room    Discussed with patient informed consent including diagnosis, risks and benefits of proposed treatment above vs. alternative treatments vs. no treatment, as well as serious and common side effects of these treatments, and the inherent unpredictability of individual responses to these treatments. The patient expresses understanding of the above and displays the capacity to agree with this current plan. Patient also agrees that, currently, the benefits outweigh the risks and would like to pursue treatment at this time, and had no other questions.    Instructions:  Take all medications as prescribed.    Abstain from recreational drugs and  alcohol.  Present to ED or call 911 for SI/HI plan or intent, psychosis, or medical emergency.    Return to Clinic: Follow up in about 2 weeks (around 10/11/2022).    Total time: Total time spent with patient 40 minutes with 20 minutes spent on medication management and 20 minutes spent on psychotherapy.     Damian Ward MD  [facility]

## 2022-10-19 ENCOUNTER — CLINICAL SUPPORT (OUTPATIENT)
Dept: CARDIOLOGY | Facility: CLINIC | Age: 27
End: 2022-10-19
Payer: MEDICAID

## 2022-10-19 VITALS
TEMPERATURE: 98 F | DIASTOLIC BLOOD PRESSURE: 82 MMHG | RESPIRATION RATE: 16 BRPM | HEIGHT: 66 IN | SYSTOLIC BLOOD PRESSURE: 130 MMHG | WEIGHT: 234.56 LBS | OXYGEN SATURATION: 98 % | BODY MASS INDEX: 37.69 KG/M2

## 2022-10-19 DIAGNOSIS — Z01.30 BLOOD PRESSURE CHECK: Primary | ICD-10-CM

## 2022-10-19 PROCEDURE — 99215 OFFICE O/P EST HI 40 MIN: CPT | Mod: PBBFAC

## 2022-10-19 NOTE — PROGRESS NOTES
"Patient here for a nursing visit to assess BP/HR. Pt states she had a syncopal episode on 9/30/2022. Pt states "I'm always dizzy." Pt c/o palpitations, but states "my heart rate is always normal like 70s and 80s when I check it even though I can feel my heart beating fast." Pt denies sob and cp. Pt has home BP log. V/S obtained BP-133/93; 130/82 (Manual), Pulse-95, Resp-16, and [id number]%. Information above presented to [facility]ANNA[facility]. No new orders. Keep appt with [facility] for tilt table test and keep f/u appt w [facility]. Patient acknowledged and verbalized understanding.   "

## 2022-11-07 ENCOUNTER — OFFICE VISIT (OUTPATIENT)
Dept: BEHAVIORAL HEALTH | Facility: CLINIC | Age: 27
End: 2022-11-07
Payer: MEDICAID

## 2022-11-07 VITALS
OXYGEN SATURATION: 97 % | TEMPERATURE: 98 F | DIASTOLIC BLOOD PRESSURE: 80 MMHG | HEART RATE: 79 BPM | WEIGHT: 232.19 LBS | SYSTOLIC BLOOD PRESSURE: 128 MMHG | BODY MASS INDEX: 37.43 KG/M2

## 2022-11-07 DIAGNOSIS — F33.1 MODERATE EPISODE OF RECURRENT MAJOR DEPRESSIVE DISORDER: Chronic | ICD-10-CM

## 2022-11-07 DIAGNOSIS — G47.00 INSOMNIA, UNSPECIFIED TYPE: ICD-10-CM

## 2022-11-07 DIAGNOSIS — F43.10 PTSD (POST-TRAUMATIC STRESS DISORDER): Primary | Chronic | ICD-10-CM

## 2022-11-07 DIAGNOSIS — F41.1 GAD (GENERALIZED ANXIETY DISORDER): Chronic | ICD-10-CM

## 2022-11-07 PROCEDURE — 3079F PR MOST RECENT DIASTOLIC BLOOD PRESSURE 80-89 MM HG: ICD-10-PCS | Mod: AF,HB,CPTII, | Performed by: STUDENT IN AN ORGANIZED HEALTH CARE EDUCATION/TRAINING PROGRAM

## 2022-11-07 PROCEDURE — 3079F DIAST BP 80-89 MM HG: CPT | Mod: AF,HB,CPTII, | Performed by: STUDENT IN AN ORGANIZED HEALTH CARE EDUCATION/TRAINING PROGRAM

## 2022-11-07 PROCEDURE — 99213 OFFICE O/P EST LOW 20 MIN: CPT | Mod: AF,HB,S$PBB, | Performed by: STUDENT IN AN ORGANIZED HEALTH CARE EDUCATION/TRAINING PROGRAM

## 2022-11-07 PROCEDURE — 3074F PR MOST RECENT SYSTOLIC BLOOD PRESSURE < 130 MM HG: ICD-10-PCS | Mod: AF,HB,CPTII, | Performed by: STUDENT IN AN ORGANIZED HEALTH CARE EDUCATION/TRAINING PROGRAM

## 2022-11-07 PROCEDURE — 1160F PR REVIEW ALL MEDS BY PRESCRIBER/CLIN PHARMACIST DOCUMENTED: ICD-10-PCS | Mod: AF,HB,CPTII, | Performed by: STUDENT IN AN ORGANIZED HEALTH CARE EDUCATION/TRAINING PROGRAM

## 2022-11-07 PROCEDURE — 3074F SYST BP LT 130 MM HG: CPT | Mod: AF,HB,CPTII, | Performed by: STUDENT IN AN ORGANIZED HEALTH CARE EDUCATION/TRAINING PROGRAM

## 2022-11-07 PROCEDURE — 3008F BODY MASS INDEX DOCD: CPT | Mod: AF,HB,CPTII, | Performed by: STUDENT IN AN ORGANIZED HEALTH CARE EDUCATION/TRAINING PROGRAM

## 2022-11-07 PROCEDURE — 1159F PR MEDICATION LIST DOCUMENTED IN MEDICAL RECORD: ICD-10-PCS | Mod: AF,HB,CPTII, | Performed by: STUDENT IN AN ORGANIZED HEALTH CARE EDUCATION/TRAINING PROGRAM

## 2022-11-07 PROCEDURE — 1160F RVW MEDS BY RX/DR IN RCRD: CPT | Mod: AF,HB,CPTII, | Performed by: STUDENT IN AN ORGANIZED HEALTH CARE EDUCATION/TRAINING PROGRAM

## 2022-11-07 PROCEDURE — 99213 PR OFFICE/OUTPT VISIT, EST, LEVL III, 20-29 MIN: ICD-10-PCS | Mod: AF,HB,S$PBB, | Performed by: STUDENT IN AN ORGANIZED HEALTH CARE EDUCATION/TRAINING PROGRAM

## 2022-11-07 PROCEDURE — 99213 OFFICE O/P EST LOW 20 MIN: CPT | Mod: PBBFAC,PN | Performed by: STUDENT IN AN ORGANIZED HEALTH CARE EDUCATION/TRAINING PROGRAM

## 2022-11-07 PROCEDURE — 1159F MED LIST DOCD IN RCRD: CPT | Mod: AF,HB,CPTII, | Performed by: STUDENT IN AN ORGANIZED HEALTH CARE EDUCATION/TRAINING PROGRAM

## 2022-11-07 PROCEDURE — 3008F PR BODY MASS INDEX (BMI) DOCUMENTED: ICD-10-PCS | Mod: AF,HB,CPTII, | Performed by: STUDENT IN AN ORGANIZED HEALTH CARE EDUCATION/TRAINING PROGRAM

## 2022-11-07 RX ORDER — TRAZODONE HYDROCHLORIDE 50 MG/1
TABLET ORAL
Qty: 60 TABLET | Refills: 5 | Status: SHIPPED | OUTPATIENT
Start: 2022-11-07 | End: 2022-12-22 | Stop reason: SDUPTHER

## 2022-11-07 RX ORDER — PRAZOSIN HYDROCHLORIDE 2 MG/1
4 CAPSULE ORAL NIGHTLY
Qty: 60 CAPSULE | Refills: 5 | Status: SHIPPED | OUTPATIENT
Start: 2022-11-07 | End: 2022-12-22 | Stop reason: SDUPTHER

## 2022-11-07 NOTE — PROGRESS NOTES
Outpatient Psychiatry Follow-Up Visit    11/7/2022    Clinical Status of Patient:  Outpatient (Ambulatory)    Chief Complaint:  Salma Montero is a 27 y.o. female who presents today for follow-up of MDD, FLORENCE, PTSD, insomnia. Patient last seen for follow-up on 9/27/2022. Met with patient.      Interval History and Content of Current Session:  Interim Events/Subjective Report/Content of Current Session:   Pt somewhat improved relative to last visit.  Reports she's having a hard time dealing with her grandmother's declining health (end stage Alzheimer's, now on hospice).  She reports mood has been down, anxiety stable.  Diagnosed with syncope, cardiology workup ongoing.  Slept better with prazosin but had increased syncope episodes (now taking 2mg nightly).  Cardiologist stopped daytime prazosin.  Tried to set up counseling with [facility] but clinic now closed.  Wants to look into alternative options.  Denies SI/HI/AVH/paranoia, denies plan or desire for self harm or harm to others.  Denies new somatic complaints.  Wants to adjust regimen to address ongoing symptoms.      Review of Systems   PSYCHIATRIC: Pertinant items are noted in the narrative.  CONSTITUTIONAL: No weight gain or loss.  MUSCULOSKELETAL: No pain or stiffness of the joints.  NEUROLOGIC: No weakness, sensory changes, seizures, confusion, memory loss, tremor or other abnormal movements.  +LOC.   RESPIRATORY: No shortness of breath.  CARDIOVASCULAR: No tachycardia or chest pain.  +palpitations.   GASTROINTESTINAL: denies vomiting poor appetite, diarrhea.  +heartburn.     Past Medical, Family and Social History: The patient's past medical, family and social history have been reviewed and updated as appropriate within the electronic medical record - see encounter notes.    Compliance: good    Side effects: denies    Risk Parameters:  Patient reports no suicidal ideation  Patient reports no homicidal ideation  Patient reports no self-injurious  "behavior  Patient reports no violent behavior    Exam (detailed: at least 9 elements; comprehensive: all 15 elements)   Constitutional  Vitals:  Most recent vital signs, dated less than 90 days prior to this appointment, were reviewed.     Vitals:    11/07/22 1056   BP: 128/80   Pulse: 79   Temp: 98 °F (36.7 °C)   SpO2: 97%   Weight: 105.3 kg (232 lb 3.2 oz)        General:   Constitutional: No acute distress, appears stated age, casually dressed    Neurologic:   Motor: moves all extremities spontaneously and without difficulty, no abnormal involuntary movements observed  Gait: normal gait and station    Mental status examination:   Appearance: appears stated age, casually dressed, no acute distress  Behavior: unremarkable for situation, calm and cooperative  Mood: "not good"  Affect: mood congruent, constricted, brighter  Thought process: linear and goal directed  Associations: appropriate for conversation  Thought content: no plan or desire for self harm or harm to others, denies paranoia, no delusional ideation volunteered  Perceptions: denies hallucinations or other altered perceptions  Orientation: oriented to day of week, month, year, location and situation  Language: English, fluid  Attention: able to attend to interview  Insight: good  Judgement: good    PHQ9 11/7/2022   Total Score 17     GAD7 9/27/2022 8/25/2022 7/28/2022   1. Feeling nervous, anxious, or on edge? 3 3 2   2. Not being able to stop or control worrying? 3 3 1   3. Worrying too much about different things? 3 3 2   4. Trouble relaxing? 3 1 0   5. Being so restless that it is hard to sit still? 3 1 0   6. Becoming easily annoyed or irritable? 3 1 2   7. Feeling afraid as if something awful might happen? 3 1 2   8. If you checked off any problems, how difficult have these problems made it for you to do your work, take care of things at home, or get along with other people? 3 3 1   FLORENCE-7 Score 21 13 9     Assessment and Diagnosis "   Status/Progress: Based on the examination today, the patient's problem(s) is/are worsening.  New problems have not been presented today.   Co-morbidities and Lack of compliance are not complicating management of the primary condition.       General Impression:    ICD-10-CM ICD-9-CM   1. PTSD (post-traumatic stress disorder)  F43.10 309.81   2. Moderate episode of recurrent major depressive disorder  F33.1 296.32   3. FLORENCE (generalized anxiety disorder)  F41.1 300.02   4. Insomnia, unspecified type  G47.00 780.52     - R/o cluster B personality    Intervention/Counseling/Treatment Plan   Continue klonopin 0.5mg bid  Change trazodone to 50-100mg nightly prn insomnia  Continue trileptal 300mg bid  Continue trintellix 20mg daily  Continue prazosin 2mg nightly for now.  Recommended pt take BP when taking and about 1 hr after taking.  If BP unchanged, will trial 4mg nightly.  Discussed that I am happy to discuss with her cardiologist.   Recommended pt continue to follow up with other providers  CBC and CMP unremarkable, TSH wnl, UDS +cannabis and +benzo  No need for PEC as pt is not an imminent danger to self or others or gravely disabled due to acute psychiatric illness  Discussed that pt should either call clinic for psychiatric crisis symptoms or present to nearest emergency room    Discussed with patient informed consent including diagnosis, risks and benefits of proposed treatment above vs. alternative treatments vs. no treatment, as well as serious and common side effects of these treatments, and the inherent unpredictability of individual responses to these treatments. The patient expresses understanding of the above and displays the capacity to agree with this current plan. Patient also agrees that, currently, the benefits outweigh the risks and would like to pursue treatment at this time, and had no other questions.    Instructions:  Take all medications as prescribed.    Abstain from recreational drugs and  alcohol.  Present to ED or call 911 for SI/HI plan or intent, psychosis, or medical emergency.    Return to Clinic: Follow up in about 4 weeks (around 12/5/2022).    Total time: Total time spent with patient 20 minutes.     Damian Ward MD  [facility]

## 2022-11-29 ENCOUNTER — PATIENT MESSAGE (OUTPATIENT)
Dept: BEHAVIORAL HEALTH | Facility: CLINIC | Age: 27
End: 2022-11-29
Payer: MEDICAID

## 2022-11-29 NOTE — PROGRESS NOTES
CHIEF COMPLAINT:   Chief Complaint   Patient presents with    Follow-up    Dizziness    Palpitations    Shortness of Breath     Patient here for 2 mth f/u. Pt c/o dizziness while walking and resting. Pt c/o sob while walking and resting. Pt c/o palpitations while walking and resting. Pt denies swelling and cp.                      Review of patient's allergies indicates:   Allergen Reactions    Corn containing products Anaphylaxis    Corn meal-luffa cylindrica frt Anaphylaxis, Hives, Itching and Rash    Orange Hives and Itching    Sulfur     Sulfa (sulfonamide antibiotics) Rash, Hives and Itching                                          HPI:  Salma Montero 27 y.o. female with a past medical history of anxiety, depression and GERD presents for follow up and ongoing care.  Patient was originally referred to Cardiology Clinic for evaluation of palpitations and syncope.  Of note, the patient endorses a long standing history of palpitations since age 14, but reported progression of symptoms with associated chest pressure, dizziness, and syncope.  She completed an Echocardiogram on 03/15/2022 which revealed an intact left ventricular ejection fraction that measured approximately 55% and no significant valvular abnormalities.(see below).  24 hour Holter monitor in [facility] 2022 revealed sinus rhythm, average ventricular rate is elevated 100.  Patient was noted to be tachycardiac 42% of the time.  There were rare PACs and PVCs.  No atrial fibrillation, SVT or VT noted. The patient completed a 2 week event monitor that revealed the patient was in sinus rhythm with average heart rate is 78 bpm, minimal 56 bpm and maximum 134 bpm.  There were no significant pauses, PVCs, VT, or AFib.  There was 1 run SVT for 4 beats. She completed a treadmill stress test on 06/29/2022 in which the Pineda treadmill score indicated low risk for cardiovascular events.  She also completed a carotid ultrasound on 9.12.22 that demonstrated no  hemodynamically significant stenosis to bilateral internal carotid arteries.  The patient reported symptoms during sinus rhythm.      Patient continues to endorse near-syncope, syncope, palpitations, and SOB since her previous appointment.  Of note, patient recently completed a tilt-table test with [facility] in [facility] which was negative for neurocardiogenic syncope.  Patient has also been followed by Neurology and had a normal EEG in January of 2022.  She continues to follow up with Neurology as directed.  She reports compliance with her medications.                                                                                                                                                                                       Exercise Stress - EKG 6.29.22:  Interpretation Summary  The EKG portion of this study is negative for ischemia.  The patient reported no chest pain during the stress test.  The blood pressure response to stress was normal.  There were no arrhythmias during stress.  Minutes exercised:  5  ST deviation:  0 mm  Angina:  no angina (0)  Angina index:  0  [facility] treadmill score:  5  Patient has Pineda Treadmill Score = 5.  This indicates low risk for cardiovascular events (predicted 4 year survival is 99%    ECHO 3.15.22  Left ventricular ejection fraction measured approximately 55%  Structurally normal mitral valve  Structurally normal trileaflet aortic valve  Tricuspid valve is structurally normal  Trace tricuspid regurgitation  Pulmonary arterial systolic pressure is estimated to be normal  Pulmonic valve is structurally normal  Mild pulmonic regurgitation present  Normal size left atrium  Number right atrial size  Normal right ventricular chamber size and function                                                                                            Patient Active Problem List   Diagnosis    Hyperhidrosis    Insomnia    Migraine headache    Tinnitus    Obesity    Moderate episode of recurrent  major depressive disorder    FLORENCE (generalized anxiety disorder)    Vitamin D deficiency    Allergic rhinitis    Elevated low density lipoprotein (LDL) cholesterol level    Gastroesophageal reflux disease    Heart palpitations    Syncope    PTSD (post-traumatic stress disorder)    Chronic migraine without aura without status migrainosus, not intractable    Orthostatic dizziness     Past Surgical History:   Procedure Laterality Date    NISSEN FUNDOPLICATION      THROAT SURGERY      TONSILLECTOMY AND ADENOIDECTOMY      TONSILLECTOMY, ADENOIDECTOMY       Social History     Socioeconomic History    Marital status:    Tobacco Use    Smoking status: Never    Smokeless tobacco: Never   Substance and Sexual Activity    Alcohol use: Yes     Alcohol/week: 2.0 standard drinks     Types: 2 Shots of liquor per week     Comment: once a month    Drug use: Yes     Types: Marijuana    Sexual activity: Yes     Partners: Male     Social Determinants of Health     Physical Activity: Inactive    Days of Exercise per Week: 0 days    Minutes of Exercise per Session: 0 min        Family History   Problem Relation Age of Onset    Heart failure Father     Bipolar disorder Sister     Cancer Maternal Grandmother     Anxiety disorder Maternal Grandmother     Heart failure Maternal Grandfather     Diabetes Maternal Grandfather     Bipolar disorder Cousin     Bipolar disorder Cousin          Current Outpatient Medications:     clonazePAM (KLONOPIN) 0.5 MG tablet, Take 1 tablet (0.5 mg total) by mouth 2 (two) times daily., Disp: 60 tablet, Rfl: 2    fexofenadine-pseudoephedrine (ALLEGRA-D 24) 180-240 mg per 24 hr tablet, fexofenadine-pseudoephedrine  mg-240 mg tablet,ext.release 24 hr  Take 1 tablet every day by oral route., Disp: , Rfl:     glycopyrrolate (ROBINUL) 1 mg Tab, Take 1 mg by mouth once daily., Disp: , Rfl:     L norgest/e.estradioL-e.estrad (SEASONIQUE) 0.15 mg-30 mcg (84)/10 mcg (7) 3MPk, Ashlyna 0.15 mg-30 mcg (84)/10  "mcg(7) tablets,3 month dose pack  Take 1 tablet every day by oral route., Disp: , Rfl:     loratadine (CLARITIN) 10 mg tablet, Take 10 mg by mouth once daily., Disp: , Rfl:     metoprolol succinate (TOPROL-XL) 25 MG 24 hr tablet, Take 1 tablet (25 mg total) by mouth once daily., Disp: 30 tablet, Rfl: 4    OXcarbazepine (TRILEPTAL) 300 MG Tab, Take 1 tablet (300 mg total) by mouth 2 (two) times daily., Disp: 60 tablet, Rfl: 5    pantoprazole (PROTONIX) 40 MG tablet, Take 1 tablet (40 mg total) by mouth once daily., Disp: 90 tablet, Rfl: 3    prazosin (MINIPRESS) 2 MG Cap, Take 2 capsules (4 mg total) by mouth every evening., Disp: 60 capsule, Rfl: 5    traZODone (DESYREL) 50 MG tablet, Take 50-100mg by mouth nightly as needed for insomnia., Disp: 60 tablet, Rfl: 5    vitamin D (VITAMIN D3) 1000 units Tab, Take 2,000 Units by mouth once daily., Disp: , Rfl:     vortioxetine (TRINTELLIX) 20 mg Tab, Take 1 tablet (20 mg total) by mouth Daily., Disp: 30 tablet, Rfl: 5     ROS:                                                                                                                                                                             Review of Systems   Constitutional: Negative.    HENT: Negative.     Eyes: Negative.    Respiratory:  Positive for shortness of breath.    Cardiovascular:  Positive for palpitations.   Gastrointestinal: Negative.    Genitourinary: Negative.    Musculoskeletal: Negative.    Skin: Negative.    Neurological:  Positive for dizziness.        Syncope   Endo/Heme/Allergies: Negative.    Psychiatric/Behavioral: Negative.        Blood pressure 120/81, pulse 105, temperature 98.1 °F (36.7 °C), temperature source Oral, resp. rate 16, height 5' 6" (1.676 m), weight 106.4 kg (234 lb 9.6 oz), last menstrual period 11/10/2022, SpO2 100 %.   PE:  Physical Exam  HENT:      Head: Normocephalic.      Nose: Nose normal.   Eyes:      Pupils: Pupils are equal, round, and reactive to light. "   Cardiovascular:      Rate and Rhythm: Normal rate and regular rhythm.   Pulmonary:      Effort: Pulmonary effort is normal.   Abdominal:      General: Abdomen is flat. Bowel sounds are normal.      Palpations: Abdomen is soft.   Musculoskeletal:         General: Normal range of motion.      Cervical back: Normal range of motion.   Skin:     General: Skin is warm.   Neurological:      General: No focal deficit present.      Mental Status: She is alert.   Psychiatric:         Mood and Affect: Mood normal.        ASSESSMENT/PLAN:  Palpitations/Tachycardia   - LVEF- 55% with significant valvular abnormalities (March 2022)  - Holter monitor- sinus rhythm, average ventricular rate is elevated 100.  Patient was noted to be tachycardiac 42% of the time.  There were rare PACs and PVCs.  No atrial fibrillation, SVT or VT noted (Feb. 2022)  - Will repeat 30 day event monitor  - Increase Metoprolol Succinate to 50mg daily  - Instructed to avoid caffeine and stay hydrated       Syncope   - April 2022, June 30th. Last September 17th  (see HPI)  - 2 week event monitor for further evaluation - SR with average heart rate is 78 bpm, minimal 56 bpm and maximum 134 bpm.  There were no significant pauses, PVCs, VT, or AFib.  There was 1 run SVT for 4 beats  - Holter monitor - (see above)  - EEG -normal (Jan. 2022)  - TST -  low risk for cardiovascular events, EKG portion of study is negative for ischemia (6.29.22)  - Carotid US - no hemodynamically significant stenosis of bilateral internal carotid arteries (9.12.22)  -Tilt Table test October 2022: negative for neurocardiogenic syncope.   - Will repeat 30 day event monitor as patient has had multiple syncopal episodes since her previous appt    Increase metoprolol succinate to 50 mg p.o. daily  30 day event monitor  Follow-up in cardiology clinic in 3 months   Continue to follow up with PCP and Neurology as directed

## 2022-11-30 ENCOUNTER — OFFICE VISIT (OUTPATIENT)
Dept: CARDIOLOGY | Facility: CLINIC | Age: 27
End: 2022-11-30
Payer: MEDICAID

## 2022-11-30 VITALS
TEMPERATURE: 98 F | OXYGEN SATURATION: 100 % | DIASTOLIC BLOOD PRESSURE: 81 MMHG | SYSTOLIC BLOOD PRESSURE: 120 MMHG | RESPIRATION RATE: 16 BRPM | HEIGHT: 66 IN | HEART RATE: 105 BPM | BODY MASS INDEX: 37.71 KG/M2 | WEIGHT: 234.63 LBS

## 2022-11-30 DIAGNOSIS — R55 SYNCOPE, UNSPECIFIED SYNCOPE TYPE: Primary | ICD-10-CM

## 2022-11-30 DIAGNOSIS — R06.02 SOB (SHORTNESS OF BREATH): ICD-10-CM

## 2022-11-30 DIAGNOSIS — R61 HYPERHIDROSIS: ICD-10-CM

## 2022-11-30 DIAGNOSIS — Z79.899 POLYPHARMACY: ICD-10-CM

## 2022-11-30 DIAGNOSIS — F32.A DEPRESSIVE DISORDER: ICD-10-CM

## 2022-11-30 DIAGNOSIS — R00.2 HEART PALPITATIONS: Chronic | ICD-10-CM

## 2022-11-30 DIAGNOSIS — F43.10 PTSD (POST-TRAUMATIC STRESS DISORDER): ICD-10-CM

## 2022-11-30 DIAGNOSIS — F41.1 ANXIETY STATE: ICD-10-CM

## 2022-11-30 DIAGNOSIS — G43.709 CHRONIC MIGRAINE WITHOUT AURA WITHOUT STATUS MIGRAINOSUS, NOT INTRACTABLE: ICD-10-CM

## 2022-11-30 PROCEDURE — 1160F PR REVIEW ALL MEDS BY PRESCRIBER/CLIN PHARMACIST DOCUMENTED: ICD-10-PCS | Mod: CPTII,,, | Performed by: NURSE PRACTITIONER

## 2022-11-30 PROCEDURE — 1159F PR MEDICATION LIST DOCUMENTED IN MEDICAL RECORD: ICD-10-PCS | Mod: CPTII,,, | Performed by: NURSE PRACTITIONER

## 2022-11-30 PROCEDURE — 99215 OFFICE O/P EST HI 40 MIN: CPT | Mod: PBBFAC | Performed by: NURSE PRACTITIONER

## 2022-11-30 PROCEDURE — 3074F PR MOST RECENT SYSTOLIC BLOOD PRESSURE < 130 MM HG: ICD-10-PCS | Mod: CPTII,,, | Performed by: NURSE PRACTITIONER

## 2022-11-30 PROCEDURE — 3008F PR BODY MASS INDEX (BMI) DOCUMENTED: ICD-10-PCS | Mod: CPTII,,, | Performed by: NURSE PRACTITIONER

## 2022-11-30 PROCEDURE — 99214 OFFICE O/P EST MOD 30 MIN: CPT | Mod: S$PBB,,, | Performed by: NURSE PRACTITIONER

## 2022-11-30 PROCEDURE — 1159F MED LIST DOCD IN RCRD: CPT | Mod: CPTII,,, | Performed by: NURSE PRACTITIONER

## 2022-11-30 PROCEDURE — 3074F SYST BP LT 130 MM HG: CPT | Mod: CPTII,,, | Performed by: NURSE PRACTITIONER

## 2022-11-30 PROCEDURE — 3008F BODY MASS INDEX DOCD: CPT | Mod: CPTII,,, | Performed by: NURSE PRACTITIONER

## 2022-11-30 PROCEDURE — 3079F DIAST BP 80-89 MM HG: CPT | Mod: CPTII,,, | Performed by: NURSE PRACTITIONER

## 2022-11-30 PROCEDURE — 3079F PR MOST RECENT DIASTOLIC BLOOD PRESSURE 80-89 MM HG: ICD-10-PCS | Mod: CPTII,,, | Performed by: NURSE PRACTITIONER

## 2022-11-30 PROCEDURE — 99214 PR OFFICE/OUTPT VISIT, EST, LEVL IV, 30-39 MIN: ICD-10-PCS | Mod: S$PBB,,, | Performed by: NURSE PRACTITIONER

## 2022-11-30 PROCEDURE — 1160F RVW MEDS BY RX/DR IN RCRD: CPT | Mod: CPTII,,, | Performed by: NURSE PRACTITIONER

## 2022-11-30 RX ORDER — METOPROLOL SUCCINATE 50 MG/1
50 TABLET, EXTENDED RELEASE ORAL DAILY
Qty: 30 TABLET | Refills: 11 | Status: SHIPPED | OUTPATIENT
Start: 2022-11-30 | End: 2022-12-13

## 2022-11-30 NOTE — PATIENT INSTRUCTIONS
Increase Metoprolol Succinate to 50mg daily  30 Day event monitor  Follow up in Dr. Carcamo's clinic in 3 months

## 2022-12-02 ENCOUNTER — OFFICE VISIT (OUTPATIENT)
Dept: GASTROENTEROLOGY | Facility: CLINIC | Age: 27
End: 2022-12-02
Payer: MEDICAID

## 2022-12-02 VITALS
HEART RATE: 103 BPM | SYSTOLIC BLOOD PRESSURE: 117 MMHG | OXYGEN SATURATION: 96 % | BODY MASS INDEX: 37.26 KG/M2 | WEIGHT: 231.81 LBS | HEIGHT: 66 IN | TEMPERATURE: 98 F | RESPIRATION RATE: 16 BRPM | DIASTOLIC BLOOD PRESSURE: 84 MMHG

## 2022-12-02 DIAGNOSIS — R11.2 NAUSEA AND VOMITING, UNSPECIFIED VOMITING TYPE: Primary | ICD-10-CM

## 2022-12-02 DIAGNOSIS — K21.9 GASTROESOPHAGEAL REFLUX DISEASE, UNSPECIFIED WHETHER ESOPHAGITIS PRESENT: ICD-10-CM

## 2022-12-02 PROCEDURE — 99214 PR OFFICE/OUTPT VISIT, EST, LEVL IV, 30-39 MIN: ICD-10-PCS | Mod: S$PBB,,, | Performed by: NURSE PRACTITIONER

## 2022-12-02 PROCEDURE — 99215 OFFICE O/P EST HI 40 MIN: CPT | Mod: PBBFAC | Performed by: NURSE PRACTITIONER

## 2022-12-02 PROCEDURE — 3008F BODY MASS INDEX DOCD: CPT | Mod: CPTII,,, | Performed by: NURSE PRACTITIONER

## 2022-12-02 PROCEDURE — 3074F PR MOST RECENT SYSTOLIC BLOOD PRESSURE < 130 MM HG: ICD-10-PCS | Mod: CPTII,,, | Performed by: NURSE PRACTITIONER

## 2022-12-02 PROCEDURE — 3079F DIAST BP 80-89 MM HG: CPT | Mod: CPTII,,, | Performed by: NURSE PRACTITIONER

## 2022-12-02 PROCEDURE — 1160F PR REVIEW ALL MEDS BY PRESCRIBER/CLIN PHARMACIST DOCUMENTED: ICD-10-PCS | Mod: CPTII,,, | Performed by: NURSE PRACTITIONER

## 2022-12-02 PROCEDURE — 3074F SYST BP LT 130 MM HG: CPT | Mod: CPTII,,, | Performed by: NURSE PRACTITIONER

## 2022-12-02 PROCEDURE — 3008F PR BODY MASS INDEX (BMI) DOCUMENTED: ICD-10-PCS | Mod: CPTII,,, | Performed by: NURSE PRACTITIONER

## 2022-12-02 PROCEDURE — 3079F PR MOST RECENT DIASTOLIC BLOOD PRESSURE 80-89 MM HG: ICD-10-PCS | Mod: CPTII,,, | Performed by: NURSE PRACTITIONER

## 2022-12-02 PROCEDURE — 1159F MED LIST DOCD IN RCRD: CPT | Mod: CPTII,,, | Performed by: NURSE PRACTITIONER

## 2022-12-02 PROCEDURE — 1160F RVW MEDS BY RX/DR IN RCRD: CPT | Mod: CPTII,,, | Performed by: NURSE PRACTITIONER

## 2022-12-02 PROCEDURE — 1159F PR MEDICATION LIST DOCUMENTED IN MEDICAL RECORD: ICD-10-PCS | Mod: CPTII,,, | Performed by: NURSE PRACTITIONER

## 2022-12-02 PROCEDURE — 99214 OFFICE O/P EST MOD 30 MIN: CPT | Mod: S$PBB,,, | Performed by: NURSE PRACTITIONER

## 2022-12-02 RX ORDER — ACETAMINOPHEN 500 MG
50 TABLET ORAL DAILY
COMMUNITY
Start: 2022-10-19

## 2022-12-02 RX ORDER — LEVONORGESTREL / ETHINYL ESTRADIOL AND ETHINYL ESTRADIOL 150-30(84)
1 KIT ORAL DAILY
COMMUNITY
Start: 2022-03-08 | End: 2023-05-24 | Stop reason: HOSPADM

## 2022-12-02 RX ORDER — LORATADINE 10 MG
2 TABLET ORAL DAILY
COMMUNITY

## 2022-12-02 RX ORDER — PROMETHAZINE HYDROCHLORIDE 12.5 MG/1
12.5 TABLET ORAL
COMMUNITY
Start: 2022-12-01 | End: 2023-08-14

## 2022-12-02 RX ORDER — FAMOTIDINE, CALCIUM CARBONATE, AND MAGNESIUM HYDROXIDE 10; 800; 165 MG/1; MG/1; MG/1
1 TABLET, CHEWABLE ORAL DAILY PRN
COMMUNITY

## 2022-12-02 NOTE — ASSESSMENT & PLAN NOTE
GERD lifestyle modifications  Reflux precautions  Avoid NSAID use  Recommend cessation of marijuana use  Continue pantoprazole and Pepcid as directed  EGD  Call with updates

## 2022-12-02 NOTE — PROGRESS NOTES
"Subjective:       Patient ID: Salma Montero is a 27 y.o. female.    Chief Complaint: Gastroesophageal Reflux (Worse around menstrual cycle) and Emesis (Every morning)    This 27-year-old  female with a history of anorexia with purging and obesity is referred for GERD.  She presents unaccompanied.  She reports a history of purging which she started in the 6th grade.  She does continue to purge "mostly around the holidays."  She reports taking 125 mg of Effexor for approximately 1 year and adding another 75 mg dosage which she took for approximately 6 months.  The medication was stopped altogether (weaned over a 2 week period) in March 2022 as she felt that this was causing her frequent nausea with subsequent vomiting.  She reports being unable to hold down liquids or solids and having other side effects while on this medication.  She reports that the symptoms have persisted since that time.  She will awaken in the morning and experience gagging with subsequent vomiting.  Emesis is consistent of food particles and what she describes as acid as well.  She reports relief with promethazine in the past.  She reports that if she eats supper later than 7:00 p.m., she will vomit up food from the night before the following morning.  Her appetite is decreased and she reports an unintentional weight loss of 20 lb since March 2022.  She has frequent acid reflux symptoms.  She takes pantoprazole 40 mg daily and Pepcid complete on an as-needed basis with some relief noted.  She denies fever, chills, nocturnal symptoms, odynophagia, dysphagia, early satiety, or abdominal pain.  She has 1 formed stool daily and denies melena, hematochezia, fecal urgency, fecal incontinence, or pain with defecation.  She takes a fiber gummy daily.      She reports having an EGD with dilation done in 2009.  She denies ever having a colonoscopy done.  She denies regular NSAID use or use of blood thinners.  She denies tobacco use and " drinks alcohol on occasion.  She smokes marijuana several times per week.  Her sister has a history of Crohn's disease.    Review of patient's allergies indicates:   Allergen Reactions    Corn meal-luffa cylindrica frt Anaphylaxis, Hives, Itching and Rash    Orange Hives and Itching    Sulfa (sulfonamide antibiotics) Rash, Hives and Itching     Past Medical History:   Diagnosis Date    Dizziness     GERD (gastroesophageal reflux disease)     Headache     History of anorexia nervosa     with purging    Obesity     Palpitations      Past Surgical History:   Procedure Laterality Date    TONSILLECTOMY AND ADENOIDECTOMY      UPPER GASTROINTESTINAL ENDOSCOPY       Family History:   family history includes Alzheimer's disease in her maternal grandmother; Anxiety disorder in her maternal grandmother; Bipolar disorder in her cousin, cousin, and sister; Cancer in her maternal grandmother; Crohn's disease in her sister; Diabetes in her maternal grandfather; Heart failure in her father and maternal grandfather; Skin cancer in her maternal grandfather.    Social History:    reports that she has never smoked. She has never used smokeless tobacco. She reports current alcohol use of about 2.0 standard drinks per week. She reports current drug use. Frequency: 5.00 times per week. Drug: Marijuana.    Review of Systems  Negative except as noted in the HPI.      Objective:      Physical Exam  Constitutional:       Appearance: Normal appearance.   HENT:      Head: Normocephalic.      Mouth/Throat:      Mouth: Mucous membranes are moist.   Eyes:      Extraocular Movements: Extraocular movements intact.      Conjunctiva/sclera: Conjunctivae normal.      Pupils: Pupils are equal, round, and reactive to light.   Cardiovascular:      Rate and Rhythm: Normal rate and regular rhythm.      Pulses: Normal pulses.      Heart sounds: Normal heart sounds.   Pulmonary:      Effort: Pulmonary effort is normal.      Breath sounds: Normal breath  sounds.   Abdominal:      General: Bowel sounds are normal.      Palpations: Abdomen is soft.   Musculoskeletal:         General: Normal range of motion.      Cervical back: Normal range of motion and neck supple.   Skin:     General: Skin is warm and dry.   Neurological:      General: No focal deficit present.      Mental Status: She is alert and oriented to person, place, and time.   Psychiatric:         Mood and Affect: Mood normal.         Behavior: Behavior normal.         Thought Content: Thought content normal.         Judgment: Judgment normal.       Home Medications:     Current Outpatient Medications   Medication Sig    cholecalciferol, vitamin D3, (VITAMIN D3) 50 mcg (2,000 unit) Cap capsule Take 50 mcg by mouth once daily.    clonazePAM (KLONOPIN) 0.5 MG tablet Take 1 tablet (0.5 mg total) by mouth 2 (two) times daily.    famotidine-calcium carbonate-magnesium hydroxide (PEPCID COMPLETE) -165 mg Take 1 tablet by mouth daily as needed.    glycopyrrolate (ROBINUL) 1 mg Tab Take 1 mg by mouth once daily.    inulin-chromium picolinate (FIBER SELECT GUMMIES) 2-100 gram-mcg Chew Take 2 tablets by mouth once daily.    L norgest/e.estradioL-e.estrad (SEASONIQUE) 0.15 mg-30 mcg (84)/10 mcg (7) 3MPk Take 1 tablet by mouth once daily.    loratadine (CLARITIN) 10 mg tablet Take 10 mg by mouth once daily.    metoprolol succinate (TOPROL-XL) 50 MG 24 hr tablet Take 1 tablet (50 mg total) by mouth once daily.    OXcarbazepine (TRILEPTAL) 300 MG Tab Take 1 tablet (300 mg total) by mouth 2 (two) times daily.    pantoprazole (PROTONIX) 40 MG tablet Take 1 tablet (40 mg total) by mouth once daily.    prazosin (MINIPRESS) 2 MG Cap Take 2 capsules (4 mg total) by mouth every evening.    promethazine (PHENERGAN) 12.5 MG Tab Take 12.5 mg by mouth as needed.    traZODone (DESYREL) 50 MG tablet Take 50-100mg by mouth nightly as needed for insomnia.    vitamin D (VITAMIN D3) 1000 units Tab Take 2,000 Units by mouth once  daily.    vortioxetine (TRINTELLIX) 20 mg Tab Take 1 tablet (20 mg total) by mouth Daily.     Laboratory Results:     Recent Results (from the past 4032 hour(s))   Exercise Stress - EKG    Collection Time: 06/29/22 10:35 AM   Result Value Ref Range    85% Max Predicted      Max Predicted      [facility] CV CPX PATIENT IS MALE 0.0     [facility] CV CPX PATIENT IS FEMALE 1.0     HR at rest 96 bpm    Systolic blood pressure 116 mmHg    Diastolic blood pressure 70 mmHg    RPP 11,136     Exercise duration (min) 10 minutes    Exercise duration (sec) 13 seconds    Peak  bpm    Peak Systolic  mmHg    Peak Diatolic BP 74 mmHg    Peak RPP 20,572     Estimated METs 7     % Max HR Achieved 81    CBC Without Differential    Collection Time: 07/25/22 10:15 AM   Result Value Ref Range    WBC 8.0 4.5 - 11.5 x10(3)/mcL    RBC 5.08 4.20 - 5.40 x10(6)/mcL    Hgb 14.1 12.0 - 16.0 gm/dL    Hct 41.6 37.0 - 47.0 %    Platelet 370 130 - 400 x10(3)/mcL    MCV 81.9 80.0 - 94.0 fL    MCH 27.8 27.0 - 31.0 pg    MCHC 33.9 33.0 - 36.0 mg/dL    RDW 12.5 11.5 - 17.0 %    MPV 9.8 7.4 - 10.4 fL    NRBC% 0.0 %   Comprehensive Metabolic Panel    Collection Time: 07/25/22 10:15 AM   Result Value Ref Range    Sodium Level 138 136 - 145 mmol/L    Potassium Level 4.3 3.5 - 5.1 mmol/L    Chloride 107 98 - 107 mmol/L    Carbon Dioxide 20 (L) 22 - 29 mmol/L    Glucose Level 107 (H) 74 - 100 mg/dL    Blood Urea Nitrogen 8.6 7.0 - 18.7 mg/dL    Creatinine 1.00 0.55 - 1.02 mg/dL    Calcium Level Total 9.6 8.4 - 10.2 mg/dL    Protein Total 7.6 6.4 - 8.3 gm/dL    Albumin Level 3.9 3.5 - 5.0 gm/dL    Globulin 3.7 (H) 2.4 - 3.5 gm/dL    Albumin/Globulin Ratio 1.1 1.1 - 2.0 ratio    Bilirubin Total 0.6 <=1.5 mg/dL    Alkaline Phosphatase 83 40 - 150 unit/L    Alanine Aminotransferase 10 0 - 55 unit/L    Aspartate Aminotransferase 17 5 - 34 unit/L    Estimated GFR-Non  >60 mls/min/1.73/m2   Drug Screen, Urine    Collection Time: 07/25/22  10:15 AM   Result Value Ref Range    Amphetamines, Urine Negative Negative    Barbituates, Urine Negative Negative    Benzodiazepine, Urine Positive (A) Negative    Cannabinoids, Urine Positive (A) Negative    Cocaine, Urine Negative Negative    Fentanyl, Urine Negative Negative    MDMA, Urine Negative Negative    Opiates, Urine Negative Negative    Phencyclidine, Urine Negative Negative    pH, Urine 6.0 3.0 - 11.0    Specific Gravity, Urine Auto 1.026 1.001 - 1.035   TSH    Collection Time: 07/25/22 10:15 AM   Result Value Ref Range    Thyroid Stimulating Hormone 1.6205 0.3500 - 4.9400 uIU/mL   CV Ultrasound Bilateral Doppler Carotid    Collection Time: 09/12/22 10:49 AM   Result Value Ref Range    Left ICA/CCA ratio 0.63     Right ICA/CCA ratio 0.62     Left Highest ICA 77.00     Left Highest      Right Highest ICA 85.00     Right Highest      Right Highest EDV 28.00     LT Highest EDV 29.00     Right CCA prox sys 155 cm/s    Right CCA prox berman 19 cm/s    Right CCA dist sys 138 cm/s    Right CCA dist berman 22 cm/s    Right ICA prox sys 85 cm/s    Right ICA prox berman 18 cm/s    Right ICA mid sys 75 cm/s    Right ICA mid berman 28 cm/s    Right ICA dist sys 80 cm/s    Right ICA dist berman 25 cm/s    Right ECA sys 98 cm/s    Right vertebral sys 61 cm/s    Left CCA prox sys 147 cm/s    Left CCA prox berman 25 cm/s    Left CCA dist sys 123 cm/s    Left CCA dist berman 27 cm/s    Left ICA prox sys 77 cm/s    Left ICA prox breman 23 cm/s    Left ICA mid sys 75 cm/s    Left ICA mid berman 29 cm/s    Left ICA dist sys 77 cm/s    Left ICA dist berman 29 cm/s    Left ECA sys 111 cm/s    Left vertebral sys 50 cm/s    Left CCA mid sys 131 cm/s    Left CCA mid berman 27 cm/s    Right CCA mid sys 135 cm/s    Right CCA mid berman 21 cm/s    Right vertebral berman 15 cm/s    Right ECA berman 9 cm/s    Left vertebral berman 14 cm/s    Left ECA berman 11 cm/s    Left CBA sys 125 cm/s    Left CBA berman 23 cm/s    Rigth CBA sys 89 cm/s    Right  CBA bemran 19 cm/s   CBC with Differential    Collection Time: 09/13/22 11:43 AM   Result Value Ref Range    WBC 9.5 4.5 - 11.5 x10(3)/mcL    RBC 5.04 4.20 - 5.40 x10(6)/mcL    Hgb 14.2 12.0 - 16.0 gm/dL    Hct 42.9 37.0 - 47.0 %    MCV 85.1 80.0 - 94.0 fL    MCH 28.2 27.0 - 31.0 pg    MCHC 33.1 33.0 - 36.0 mg/dL    RDW 13.8 11.5 - 17.0 %    Platelet 369 130 - 400 x10(3)/mcL    MPV 9.7 7.4 - 10.4 fL    Neut % 62.6 %    Lymph % 28.9 %    Mono % 4.9 %    Eos % 2.7 %    Basophil % 0.7 %    Lymph # 2.75 0.6 - 4.6 x10(3)/mcL    Neut # 6.0 2.1 - 9.2 x10(3)/mcL    Mono # 0.47 0.1 - 1.3 x10(3)/mcL    Eos # 0.26 0 - 0.9 x10(3)/mcL    Baso # 0.07 0 - 0.2 x10(3)/mcL    IG# 0.02 0 - 0.04 x10(3)/mcL    IG% 0.2 %    NRBC% 0.0 %     Assessment/Plan:     Problem List Items Addressed This Visit          GI    Gastroesophageal reflux disease     GERD lifestyle modifications  Reflux precautions  Avoid NSAID use  Recommend cessation of marijuana use  Continue pantoprazole and Pepcid as directed  EGD  Call with updates         Relevant Orders    Case Request Endoscopy: EGD (ESOPHAGOGASTRODUODENOSCOPY) (Completed)    Nausea and vomiting - Primary     See above         Relevant Orders    Case Request Endoscopy: EGD (ESOPHAGOGASTRODUODENOSCOPY) (Completed)

## 2022-12-06 ENCOUNTER — TELEPHONE (OUTPATIENT)
Dept: GASTROENTEROLOGY | Facility: CLINIC | Age: 27
End: 2022-12-06
Payer: MEDICAID

## 2022-12-06 ENCOUNTER — TELEPHONE (OUTPATIENT)
Dept: CARDIOLOGY | Facility: CLINIC | Age: 27
End: 2022-12-06
Payer: MEDICAID

## 2022-12-06 NOTE — TELEPHONE ENCOUNTER
Spoke with pt, she states that she has had a lot cardiac issues this year and had to postpone a procedure this year due to cardiac issues. Pt is requesting that we request clearance prior to any anesthesia. [email or web address] message sent to cardiology.    Pt called to see if clearance request can be sent to Cardiology @ [facility] before she has her scope done. Pt is scheduled in Feb for scope. Pt also said it is okay to leave voicemail if she can't get to the phone.    Pt # [phone]

## 2022-12-06 NOTE — TELEPHONE ENCOUNTER
Pt called today stating [facility] [address] in [place], [place] will not let her  her new prescription of Metoprolol 50mg because their system shows she picked it up on the day it was prescribed, 11-30-22. Pt is adamant that she did not get medication that day. I did let her know we can send another prescription but she states she cannot afford the $20 out of pocket cost. She is now completely out of metoprolol. NCV- 3-14-23. Please advise.

## 2022-12-07 ENCOUNTER — TELEPHONE (OUTPATIENT)
Dept: GASTROENTEROLOGY | Facility: CLINIC | Age: 27
End: 2022-12-07
Payer: MEDICAID

## 2022-12-07 NOTE — TELEPHONE ENCOUNTER
----- Message from Hadley Bucio RN sent at 12/6/2022  1:41 PM CST -----  Regarding: RE: Cardiac clearance  Request sent to provider. Pts LCV 11/30/22. Please keep checking for updated addendum within the next 48-72 hours. Thank you.  ----- Message -----  From: Liya Calles LPN  Sent: 12/6/2022   9:26 AM CST  To: Kareen Berry MA, #  Subject: Cardiac clearance                                Good morning,    Ms. Montero is scheduled for EGD on 2/22/23. Pt called clinic requesting that we notify you and get clearance prior to procedure/anesthesia. Pt states she has already had to postpone one procedure due to cardiac issues this year. If you have any questions, please let us know.    Thank you

## 2022-12-12 NOTE — PROGRESS NOTES
"[facility] Neurology Follow Up Office Visit Note    Initial Visit Date: 3/8/2022  Last Visit Date: 9/13/2022  Current Visit Date:  12/13/2022    Chief Complaint:     Chief Complaint   Patient presents with    F/U headaches--still with debilitating headaches; Rizatript    Had a "fute state" while driving--did not remember picking        History of Present Illness:      This is 27 y.o. female with history of Anxiety, Depression, Hyperhidrosis, Eating disorder, substance abuse, Tachycardia who is referred for dizziness, tachycardia, and worsening headache disorder due to polypharmacy. Also has over sleeping issue, which leads to worsening daytime fatigue.  During last visit, propanolol 20 mg twice a day and Maxalt 5 mg twice a day as needed was discontinued.  EKG was ordered to check for QT. metoprolol succinate 25 mg once a day was started.  She was referred for tilt-table testing.  Since then, patient had seen Cardiology.  Metoprolol succinate was increased to 50 mg once a day by Cardiology.  Tilt-table testing was done, which was negative for neurogenic syncope.  Patient had continued to follow up with psych.    Interim History  Tilt table done. States that her HR has worsened. Has bitemporal and masseter tightness daily. + Bruxism during day and night time. Worsening stressors in life.     Headache Frequency: 3 migraine headache days per month with daily headache.      Presenting History   Of note, patient was started on Glycopyrrolate in 11/2021 while on Hydroxyzine, Prazosin, Trazodone, Klonopin, Effexor. She noted that around 12/2021, she had one episode whereby she felt dizzy, palpitation, lightheaded, followed by LOC. She noted that since then, she would experience out of body sensation, dizziness, and palpitations during activities. She also noted worsening headaches for the past few months. She reports chronic trouble staying asleep and waking up tired. She would go to bed at around 9 - 10 pm and wake up at around " 8 AM in the morning. She also notes hyperhydrosis that has been consistently getting worse in recent years, requiring her to start glyocopyrrolate.      Medications:     Current Outpatient Medications on File Prior to Visit   Medication Sig Dispense Refill    cholecalciferol, vitamin D3, (VITAMIN D3) 50 mcg (2,000 unit) Cap capsule Take 50 mcg by mouth once daily.      clonazePAM (KLONOPIN) 0.5 MG tablet Take 1 tablet (0.5 mg total) by mouth 2 (two) times daily. 60 tablet 2    famotidine-calcium carbonate-magnesium hydroxide (PEPCID COMPLETE) -165 mg Take 1 tablet by mouth daily as needed.      glycopyrrolate (ROBINUL) 1 mg Tab Take 1 mg by mouth once daily.      inulin-chromium picolinate (FIBER SELECT GUMMIES) 2-100 gram-mcg Chew Take 2 tablets by mouth once daily.      L norgest/e.estradioL-e.estrad (SEASONIQUE) 0.15 mg-30 mcg (84)/10 mcg (7) 3MPk Take 1 tablet by mouth once daily.      loratadine (CLARITIN) 10 mg tablet Take 10 mg by mouth once daily.      metoprolol succinate (TOPROL-XL) 50 MG 24 hr tablet Take 1 tablet (50 mg total) by mouth once daily. 30 tablet 11    OXcarbazepine (TRILEPTAL) 300 MG Tab Take 1 tablet (300 mg total) by mouth 2 (two) times daily. 60 tablet 5    pantoprazole (PROTONIX) 40 MG tablet Take 1 tablet (40 mg total) by mouth once daily. 90 tablet 3    prazosin (MINIPRESS) 2 MG Cap Take 2 capsules (4 mg total) by mouth every evening. 60 capsule 5    promethazine (PHENERGAN) 12.5 MG Tab Take 12.5 mg by mouth as needed.      traZODone (DESYREL) 50 MG tablet Take 50-100mg by mouth nightly as needed for insomnia. 60 tablet 5    vitamin D (VITAMIN D3) 1000 units Tab Take 2,000 Units by mouth once daily.      vortioxetine (TRINTELLIX) 20 mg Tab Take 1 tablet (20 mg total) by mouth Daily. 30 tablet 5     No current facility-administered medications on file prior to visit.       Labs:     Results for orders placed or performed in visit on 09/13/22   CBC with Differential   Result Value  Ref Range    WBC 9.5 4.5 - 11.5 x10(3)/mcL    RBC 5.04 4.20 - 5.40 x10(6)/mcL    Hgb 14.2 12.0 - 16.0 gm/dL    Hct 42.9 37.0 - 47.0 %    MCV 85.1 80.0 - 94.0 fL    MCH 28.2 27.0 - 31.0 pg    MCHC 33.1 33.0 - 36.0 mg/dL    RDW 13.8 11.5 - 17.0 %    Platelet 369 130 - 400 x10(3)/mcL    MPV 9.7 7.4 - 10.4 fL    Neut % 62.6 %    Lymph % 28.9 %    Mono % 4.9 %    Eos % 2.7 %    Basophil % 0.7 %    Lymph # 2.75 0.6 - 4.6 x10(3)/mcL    Neut # 6.0 2.1 - 9.2 x10(3)/mcL    Mono # 0.47 0.1 - 1.3 x10(3)/mcL    Eos # 0.26 0 - 0.9 x10(3)/mcL    Baso # 0.07 0 - 0.2 x10(3)/mcL    IG# 0.02 0 - 0.04 x10(3)/mcL    IG% 0.2 %    NRBC% 0.0 %       Studies:      - routine EEG 1/19/2022: This is a normal routine awake EEG.  - Holter 24 hrs - 42% HR in 100s with premature beats.  - 10/20/2022 Tilt Table Test: negative.     Review of Systems:     All other systems reviewed and are negative.    Physical Exams:     Vitals:    12/13/22 0957   BP: 109/75   Pulse: 88   Resp: 14   Temp: 98.2 °F (36.8 °C)       Vitals and nursing note reviewed.   Constitutional:       Appearance: Normal appearance.   HENT:      Head: Normocephalic and atraumatic.      Nose: Nose normal.      Mouth/Throat:      Mouth: Mucous membranes are moist.      Pharynx: Oropharynx is clear.   Eyes:      Conjunctiva/sclera: Conjunctivae normal.   Cardiovascular:      Rate and Rhythm: Normal rate and regular rhythm.      Pulses: Normal pulses.   Pulmonary:      Effort: Pulmonary effort is normal.      Breath sounds: Normal breath sounds.   Abdominal:      General: Abdomen is flat.   Musculoskeletal:         General: Normal range of motion.      Cervical back: Normal range of motion.   Skin:     General: Skin is warm.   Neurological:      Mental Status: She is alert.         Comprehensive Neurological Exam:  Mental Status: Alert Oriented to Self, Date, and Place. Comprehension wnl. No dysarthria.   CN II - XII: EMMA, No APD, Fundus wnl OU, VA grossly intact to finger counting at  6 ft, VFFC, No ptosis OU, EOMI without nystagmus, LT/Temp symmetric in CN V1-3 distribution, Hearing grossly intact, Face Symmetric, Tongue and Uvula midline, Trapezius symmetric bilateral.   Motor: tone and bulk wnl throughout, no abnormal involuntary or voluntary movements, 5/5 to confrontation, Fine finger movements wnl b/l, No pronator drift.   Sensory: LT, Proprioception, Vibration, PP, Temp symmetric. No sensory simultagnosia.   Reflexes: 2+ throughout, plantar reflexes downward bilateral.   Cerebellar: FNF wnl b/l, RAHM wnl b/l  Romberg: Negative  Gait: normal.      Assessment:     This is 27 y.o. female with history of Anxiety, Depression, Hyperhidrosis, Eating disorder, Palpitations, and Tachycardia, Migraine headache and chronic tension headache and Bruxism.     Problem List Items Addressed This Visit          Neuro    Chronic migraine without aura without status migrainosus, not intractable - Primary (Chronic)    Relevant Medications    metoprolol succinate (TOPROL-XL) 100 MG 24 hr tablet    baclofen (LIORESAL) 10 MG tablet       Psychiatric    PTSD (post-traumatic stress disorder) (Chronic)    Relevant Medications    metoprolol succinate (TOPROL-XL) 100 MG 24 hr tablet    baclofen (LIORESAL) 10 MG tablet       Other    Hyperhidrosis    Relevant Medications    metoprolol succinate (TOPROL-XL) 100 MG 24 hr tablet    baclofen (LIORESAL) 10 MG tablet    Orthostatic dizziness    Relevant Medications    metoprolol succinate (TOPROL-XL) 100 MG 24 hr tablet    baclofen (LIORESAL) 10 MG tablet     Other Visit Diagnoses       Depressive disorder        Relevant Medications    metoprolol succinate (TOPROL-XL) 100 MG 24 hr tablet    baclofen (LIORESAL) 10 MG tablet    Anxiety state        Relevant Medications    metoprolol succinate (TOPROL-XL) 100 MG 24 hr tablet    baclofen (LIORESAL) 10 MG tablet    Polypharmacy        Relevant Medications    metoprolol succinate (TOPROL-XL) 100 MG 24 hr tablet    baclofen  (LIORESAL) 10 MG tablet    Bruxism        Relevant Medications    metoprolol succinate (TOPROL-XL) 100 MG 24 hr tablet    baclofen (LIORESAL) 10 MG tablet            Plan:     [] start Baclofen 10 mg twice a day   [] increase Metoprolol ER to 100 mg once at night.     RTC 4 months      I have explained the treatment plan, diagnosis, and prognosis to patient. All questions are answered to the best of my knowledge.     Face to face time 30 minutes, including documentation, chart review, counseling, education, review of test results, relevant medical records, and coordination of care.   I have explained the treatment plan, diagnosis, and prognosis to patient. All questions are answered to the best of my knowledge.       Daly Colon MD   General Neurology  12/13/2022

## 2022-12-13 ENCOUNTER — OFFICE VISIT (OUTPATIENT)
Dept: NEUROLOGY | Facility: CLINIC | Age: 27
End: 2022-12-13
Payer: MEDICAID

## 2022-12-13 VITALS
SYSTOLIC BLOOD PRESSURE: 109 MMHG | OXYGEN SATURATION: 97 % | HEIGHT: 66 IN | HEART RATE: 88 BPM | DIASTOLIC BLOOD PRESSURE: 75 MMHG | RESPIRATION RATE: 14 BRPM | BODY MASS INDEX: 37.96 KG/M2 | TEMPERATURE: 98 F | WEIGHT: 236.19 LBS

## 2022-12-13 DIAGNOSIS — R42 ORTHOSTATIC DIZZINESS: ICD-10-CM

## 2022-12-13 DIAGNOSIS — F32.A DEPRESSIVE DISORDER: ICD-10-CM

## 2022-12-13 DIAGNOSIS — F43.10 PTSD (POST-TRAUMATIC STRESS DISORDER): ICD-10-CM

## 2022-12-13 DIAGNOSIS — F45.8 BRUXISM: ICD-10-CM

## 2022-12-13 DIAGNOSIS — R00.0 TACHYCARDIA: ICD-10-CM

## 2022-12-13 DIAGNOSIS — G43.709 CHRONIC MIGRAINE WITHOUT AURA WITHOUT STATUS MIGRAINOSUS, NOT INTRACTABLE: Primary | ICD-10-CM

## 2022-12-13 DIAGNOSIS — G44.229 CHRONIC TENSION-TYPE HEADACHE, NOT INTRACTABLE: ICD-10-CM

## 2022-12-13 DIAGNOSIS — F41.1 ANXIETY STATE: ICD-10-CM

## 2022-12-13 DIAGNOSIS — Z79.899 POLYPHARMACY: ICD-10-CM

## 2022-12-13 DIAGNOSIS — R61 HYPERHIDROSIS: ICD-10-CM

## 2022-12-13 PROCEDURE — 3008F PR BODY MASS INDEX (BMI) DOCUMENTED: ICD-10-PCS | Mod: CPTII,,, | Performed by: PSYCHIATRY & NEUROLOGY

## 2022-12-13 PROCEDURE — 99214 OFFICE O/P EST MOD 30 MIN: CPT | Mod: PBBFAC | Performed by: PSYCHIATRY & NEUROLOGY

## 2022-12-13 PROCEDURE — 1160F RVW MEDS BY RX/DR IN RCRD: CPT | Mod: CPTII,,, | Performed by: PSYCHIATRY & NEUROLOGY

## 2022-12-13 PROCEDURE — 3074F PR MOST RECENT SYSTOLIC BLOOD PRESSURE < 130 MM HG: ICD-10-PCS | Mod: CPTII,,, | Performed by: PSYCHIATRY & NEUROLOGY

## 2022-12-13 PROCEDURE — 1160F PR REVIEW ALL MEDS BY PRESCRIBER/CLIN PHARMACIST DOCUMENTED: ICD-10-PCS | Mod: CPTII,,, | Performed by: PSYCHIATRY & NEUROLOGY

## 2022-12-13 PROCEDURE — 1159F MED LIST DOCD IN RCRD: CPT | Mod: CPTII,,, | Performed by: PSYCHIATRY & NEUROLOGY

## 2022-12-13 PROCEDURE — 3074F SYST BP LT 130 MM HG: CPT | Mod: CPTII,,, | Performed by: PSYCHIATRY & NEUROLOGY

## 2022-12-13 PROCEDURE — 3078F PR MOST RECENT DIASTOLIC BLOOD PRESSURE < 80 MM HG: ICD-10-PCS | Mod: CPTII,,, | Performed by: PSYCHIATRY & NEUROLOGY

## 2022-12-13 PROCEDURE — 99214 PR OFFICE/OUTPT VISIT, EST, LEVL IV, 30-39 MIN: ICD-10-PCS | Mod: S$PBB,,, | Performed by: PSYCHIATRY & NEUROLOGY

## 2022-12-13 PROCEDURE — 3008F BODY MASS INDEX DOCD: CPT | Mod: CPTII,,, | Performed by: PSYCHIATRY & NEUROLOGY

## 2022-12-13 PROCEDURE — 1159F PR MEDICATION LIST DOCUMENTED IN MEDICAL RECORD: ICD-10-PCS | Mod: CPTII,,, | Performed by: PSYCHIATRY & NEUROLOGY

## 2022-12-13 PROCEDURE — 3078F DIAST BP <80 MM HG: CPT | Mod: CPTII,,, | Performed by: PSYCHIATRY & NEUROLOGY

## 2022-12-13 PROCEDURE — 99214 OFFICE O/P EST MOD 30 MIN: CPT | Mod: S$PBB,,, | Performed by: PSYCHIATRY & NEUROLOGY

## 2022-12-13 RX ORDER — BACLOFEN 10 MG/1
10 TABLET ORAL 2 TIMES DAILY
Qty: 60 TABLET | Refills: 4 | Status: SHIPPED | OUTPATIENT
Start: 2022-12-13 | End: 2023-04-14

## 2022-12-13 RX ORDER — METOPROLOL SUCCINATE 100 MG/1
100 TABLET, EXTENDED RELEASE ORAL NIGHTLY
Qty: 30 TABLET | Refills: 4 | Status: SHIPPED | OUTPATIENT
Start: 2022-12-13 | End: 2023-04-14

## 2022-12-14 ENCOUNTER — OFFICE VISIT (OUTPATIENT)
Dept: INTERNAL MEDICINE | Facility: CLINIC | Age: 27
End: 2022-12-14
Payer: MEDICAID

## 2022-12-14 DIAGNOSIS — F33.1 MODERATE EPISODE OF RECURRENT MAJOR DEPRESSIVE DISORDER: Primary | Chronic | ICD-10-CM

## 2022-12-14 DIAGNOSIS — G44.229 CHRONIC TENSION-TYPE HEADACHE, NOT INTRACTABLE: ICD-10-CM

## 2022-12-14 DIAGNOSIS — F45.8 BRUXISM: ICD-10-CM

## 2022-12-14 DIAGNOSIS — K21.9 GASTROESOPHAGEAL REFLUX DISEASE, UNSPECIFIED WHETHER ESOPHAGITIS PRESENT: ICD-10-CM

## 2022-12-14 DIAGNOSIS — Z00.00 WELLNESS EXAMINATION: ICD-10-CM

## 2022-12-14 DIAGNOSIS — R00.2 HEART PALPITATIONS: Chronic | ICD-10-CM

## 2022-12-14 DIAGNOSIS — H65.92 LEFT OTITIS MEDIA WITH EFFUSION: ICD-10-CM

## 2022-12-14 PROCEDURE — 99214 PR OFFICE/OUTPT VISIT, EST, LEVL IV, 30-39 MIN: ICD-10-PCS | Mod: 95,,, | Performed by: NURSE PRACTITIONER

## 2022-12-14 PROCEDURE — 1160F RVW MEDS BY RX/DR IN RCRD: CPT | Mod: CPTII,95,, | Performed by: NURSE PRACTITIONER

## 2022-12-14 PROCEDURE — 1160F PR REVIEW ALL MEDS BY PRESCRIBER/CLIN PHARMACIST DOCUMENTED: ICD-10-PCS | Mod: CPTII,95,, | Performed by: NURSE PRACTITIONER

## 2022-12-14 PROCEDURE — 1159F MED LIST DOCD IN RCRD: CPT | Mod: CPTII,95,, | Performed by: NURSE PRACTITIONER

## 2022-12-14 PROCEDURE — 99214 OFFICE O/P EST MOD 30 MIN: CPT | Mod: 95,,, | Performed by: NURSE PRACTITIONER

## 2022-12-14 PROCEDURE — 1159F PR MEDICATION LIST DOCUMENTED IN MEDICAL RECORD: ICD-10-PCS | Mod: CPTII,95,, | Performed by: NURSE PRACTITIONER

## 2022-12-14 NOTE — ASSESSMENT & PLAN NOTE
She was evaluated by [facility] Neurology 12/13/22 with addition of baclofen added for benefit

## 2022-12-14 NOTE — ASSESSMENT & PLAN NOTE
She was evaluated by [facility] Neurology 12/13/22 with addition of baclofen and increased dose of metoprolol for headache and bruxism treatment

## 2022-12-14 NOTE — PROGRESS NOTES
"Established Patient - Audio Only Telehealth Visit     The patient location is: home  The chief complaint leading to consultation is: "try to cope with grandmother actively dying"  Visit type: Virtual visit with audio only (telephone)  Total time spent with patient: 15 minutes       The reason for the audio only service rather than synchronous audio and video virtual visit was related to technical difficulties or patient preference/necessity.     Each patient to whom I provide medical services by telemedicine is:  (1) informed of the relationship between the physician and patient and the respective role of any other health care provider with respect to management of the patient; and (2) notified that they may decline to receive medical services by telemedicine and may withdraw from such care at any time. Patient verbally consented to receive this service via voice-only telephone call.       HPI: 26 yo WF for 3 mo follow up and lab results. PMhx includes tachycardia, syncope, hyperhidrosis, anxiety/ depression, h/o eating disorder (childhood), GERD, migraine HA, chronic AR, and obesity. She reports her grandmother is on hospice since November and really unsure how much longer she has. She is handling things okay though. She has had follow ups with psychiatrist and compliant with medications. She has Holter monitor to complete soon. Will need to complete and get cardiac clearance for EGD which is scheduled in February. She is compliant with Protonix. She had visit with neurology yesterday and additional medication added. She has not completed labs prior to visit but will return within 1 week to complete. She had PAP at the beginning of the year with DEAN Oscar at [facility] in [place]. Also reports treated for left ear infection around Thanksgiving and continues to have occasional ache and hears "squeak" when she blows her nose. Otherwise, denies fever/ chills, pain, drainage or other " concerns.    Other providers   [facility] D[facility] for GYN  [facility] Neurology  [facility] Cardiology  [facility] GI- EGD scheduled 2/22/23  Dr. Ed Olea- Dermatologist    Medication List with Changes/Refills   Current Medications    BACLOFEN (LIORESAL) 10 MG TABLET    Take 1 tablet (10 mg total) by mouth 2 (two) times daily.    CHOLECALCIFEROL, VITAMIN D3, (VITAMIN D3) 50 MCG (2,000 UNIT) CAP CAPSULE    Take 50 mcg by mouth once daily.    CLONAZEPAM (KLONOPIN) 0.5 MG TABLET    Take 1 tablet (0.5 mg total) by mouth 2 (two) times daily.    FAMOTIDINE-CALCIUM CARBONATE-MAGNESIUM HYDROXIDE (PEPCID COMPLETE) -165 MG    Take 1 tablet by mouth daily as needed.    GLYCOPYRROLATE (ROBINUL) 1 MG TAB    Take 1 mg by mouth once daily.    INULIN-CHROMIUM PICOLINATE (FIBER SELECT GUMMIES) 2-100 GRAM-MCG CHEW    Take 2 tablets by mouth once daily.    L NORGEST/E.ESTRADIOL-E.ESTRAD (SEASONIQUE) 0.15 MG-30 MCG (84)/10 MCG (7) 3MPK    Take 1 tablet by mouth once daily.    LORATADINE (CLARITIN) 10 MG TABLET    Take 10 mg by mouth once daily.    METOPROLOL SUCCINATE (TOPROL-XL) 100 MG 24 HR TABLET    Take 1 tablet (100 mg total) by mouth every evening.    OXCARBAZEPINE (TRILEPTAL) 300 MG TAB    Take 1 tablet (300 mg total) by mouth 2 (two) times daily.    PANTOPRAZOLE (PROTONIX) 40 MG TABLET    Take 1 tablet (40 mg total) by mouth once daily.    PRAZOSIN (MINIPRESS) 2 MG CAP    Take 2 capsules (4 mg total) by mouth every evening.    PROMETHAZINE (PHENERGAN) 12.5 MG TAB    Take 12.5 mg by mouth as needed.    TRAZODONE (DESYREL) 50 MG TABLET    Take 50-100mg by mouth nightly as needed for insomnia.    VORTIOXETINE (TRINTELLIX) 20 MG TAB    Take 1 tablet (20 mg total) by mouth Daily.       Assessment and plan:    Problem List Items Addressed This Visit          Neuro    Chronic tension-type headache, not intractable    Current Assessment & Plan     She was evaluated by [facility] Neurology 12/13/22 with addition of baclofen  "and increased dose of metoprolol for headache and bruxism treatment            Psychiatric    Moderate episode of recurrent major depressive disorder - Primary (Chronic)    Current Assessment & Plan     Overall stable however grandmother is on hospice at this time  She follow ups with psychiatrist/ counselor, encouraged continued f/u and medications  Discussed dying process different for each individual and grieving process is unique to each person. Encouraged her to take the time she needs to process/ accept and grieve appropriately. She was appreciative of conversation            ENT    Bruxism    Current Assessment & Plan     She was evaluated by [facility] Neurology 12/13/22 with addition of baclofen added for benefit         Left otitis media with effusion    Current Assessment & Plan     She reports treated for left ear infection x 3 weeks ago but continues to endorse "squeeking" nose when she blows her nose and occasional ache  Advised patient she needs physical examination to evaluate; she will go to nearest walk in clinic             Cardiac/Vascular    Heart palpitations (Chronic)    Current Assessment & Plan     She was evaluated by [facility] Cardiology 11/30/22 with repeat Holter monitor ordered  Will need to complete testing and obtain cardiac clearance prior to EGD             GI    Gastroesophageal reflux disease    Current Assessment & Plan     GERD diet and precautions discussed such as:  Avoid tobacco/ alcohol, NSAID use; Avoid spicy/ acidic foods, tomato sauce, omer, caffeine, chocolate, onions  Eat smaller meals; keep HOB elevated at least 2 hours after eating  Continue with current medication regimen- Rx provided x 1 year (September 2022 with [place] approval)  Scheduled for EGD 2/22/22 with [facility] GI pending cardiology approval          Other Visit Diagnoses       Wellness examination        Labs to be completed as ordered          Wellness labs ordered, will notify once receive results  PAP records requested  Keep " all appointments with specialists  Follow up in a year or sooner if needed                    This service was not originating from a related E/M service provided within the previous 7 days nor will  to an E/M service or procedure within the next 24 hours or my soonest available appointment.  Prevailing standard of care was able to be met in this audio-only visit.

## 2022-12-14 NOTE — ASSESSMENT & PLAN NOTE
She was evaluated by [facility] 11/30/22 with repeat Holter monitor ordered  Will need to complete testing and obtain cardiac clearance prior to EGD

## 2022-12-14 NOTE — ASSESSMENT & PLAN NOTE
GERD diet and precautions discussed such as:  Avoid tobacco/ alcohol, NSAID use; Avoid spicy/ acidic foods, tomato sauce, oemr, caffeine, chocolate, onions  Eat smaller meals; keep HOB elevated at least 2 hours after eating  Continue with current medication regimen- Rx provided x 1 year (September 2022 with [place] approval)  Scheduled for EGD 2/22/22 with [facility] GI pending cardiology approval

## 2022-12-14 NOTE — ASSESSMENT & PLAN NOTE
Overall stable however grandmother is on hospice at this time  She follow ups with psychiatrist/ counselor, encouraged continued f/u and medications  Discussed dying process different for each individual and grieving process is unique to each person. Encouraged her to take the time she needs to process/ accept and grieve appropriately. She was appreciative of conversation

## 2022-12-14 NOTE — ASSESSMENT & PLAN NOTE
"She reports treated for left ear infection x 3 weeks ago but continues to endorse "squeak" nose when she blows her nose and occasional ache  Advised patient she needs physical examination to evaluate; she will go to nearest walk in clinic   "

## 2022-12-15 ENCOUNTER — TELEPHONE (OUTPATIENT)
Dept: INTERNAL MEDICINE | Facility: CLINIC | Age: 27
End: 2022-12-15
Payer: MEDICAID

## 2022-12-15 ENCOUNTER — LAB VISIT (OUTPATIENT)
Dept: LAB | Facility: HOSPITAL | Age: 27
End: 2022-12-15
Attending: NURSE PRACTITIONER
Payer: MEDICAID

## 2022-12-15 DIAGNOSIS — Z00.00 WELL ADULT EXAM: ICD-10-CM

## 2022-12-15 DIAGNOSIS — E78.00 ELEVATED LOW DENSITY LIPOPROTEIN (LDL) CHOLESTEROL LEVEL: ICD-10-CM

## 2022-12-15 DIAGNOSIS — E78.5 HYPERLIPIDEMIA, UNSPECIFIED HYPERLIPIDEMIA TYPE: ICD-10-CM

## 2022-12-15 DIAGNOSIS — E66.9 CLASS 2 OBESITY WITH BODY MASS INDEX (BMI) OF 38.0 TO 38.9 IN ADULT, UNSPECIFIED OBESITY TYPE, UNSPECIFIED WHETHER SERIOUS COMORBIDITY PRESENT: Primary | ICD-10-CM

## 2022-12-15 DIAGNOSIS — Z11.9 SCREENING EXAMINATION FOR INFECTIOUS DISEASE: ICD-10-CM

## 2022-12-15 DIAGNOSIS — K21.9 GASTROESOPHAGEAL REFLUX DISEASE, UNSPECIFIED WHETHER ESOPHAGITIS PRESENT: ICD-10-CM

## 2022-12-15 LAB
ALBUMIN SERPL-MCNC: 3.6 G/DL (ref 3.5–5)
ALBUMIN/GLOB SERPL: 1 RATIO (ref 1.1–2)
ALP SERPL-CCNC: 111 UNIT/L (ref 40–150)
ALT SERPL-CCNC: 14 UNIT/L (ref 0–55)
AST SERPL-CCNC: 11 UNIT/L (ref 5–34)
BASOPHILS # BLD AUTO: 0.04 X10(3)/MCL (ref 0–0.2)
BASOPHILS NFR BLD AUTO: 0.6 %
BILIRUBIN DIRECT+TOT PNL SERPL-MCNC: 0.3 MG/DL
BUN SERPL-MCNC: 6.9 MG/DL (ref 7–18.7)
C TRACH DNA SPEC QL NAA+PROBE: NOT DETECTED
CALCIUM SERPL-MCNC: 8.9 MG/DL (ref 8.4–10.2)
CHLORIDE SERPL-SCNC: 109 MMOL/L (ref 98–107)
CHOLEST SERPL-MCNC: 179 MG/DL
CHOLEST/HDLC SERPL: 5 {RATIO} (ref 0–5)
CO2 SERPL-SCNC: 21 MMOL/L (ref 22–29)
CREAT SERPL-MCNC: 0.95 MG/DL (ref 0.55–1.02)
EOSINOPHIL # BLD AUTO: 0.19 X10(3)/MCL (ref 0–0.9)
EOSINOPHIL NFR BLD AUTO: 2.8 %
ERYTHROCYTE [DISTWIDTH] IN BLOOD BY AUTOMATED COUNT: 13.3 % (ref 11–14.5)
EST. AVERAGE GLUCOSE BLD GHB EST-MCNC: 99.7 MG/DL
GFR SERPLBLD CREATININE-BSD FMLA CKD-EPI: >60 MLS/MIN/1.73/M2
GLOBULIN SER-MCNC: 3.6 GM/DL (ref 2.4–3.5)
GLUCOSE SERPL-MCNC: 109 MG/DL (ref 74–100)
HAV IGM SERPL QL IA: NONREACTIVE
HBA1C MFR BLD: 5.1 %
HBV CORE IGM SERPL QL IA: NONREACTIVE
HBV SURFACE AG SERPL QL IA: NONREACTIVE
HCT VFR BLD AUTO: 39.6 % (ref 37–47)
HCV AB SERPL QL IA: NONREACTIVE
HDLC SERPL-MCNC: 36 MG/DL (ref 35–60)
HGB BLD-MCNC: 13.5 GM/DL (ref 12–16)
HIV 1+2 AB+HIV1 P24 AG SERPL QL IA: NONREACTIVE
IMM GRANULOCYTES # BLD AUTO: 0.01 X10(3)/MCL (ref 0–0.04)
IMM GRANULOCYTES NFR BLD AUTO: 0.1 %
LDLC SERPL CALC-MCNC: 113 MG/DL (ref 50–140)
LYMPHOCYTES # BLD AUTO: 2.43 X10(3)/MCL (ref 0.6–4.6)
LYMPHOCYTES NFR BLD AUTO: 36.2 %
MCH RBC QN AUTO: 28.6 PG
MCHC RBC AUTO-ENTMCNC: 34.1 MG/DL (ref 33–36)
MCV RBC AUTO: 83.9 FL (ref 80–94)
MONOCYTES # BLD AUTO: 0.37 X10(3)/MCL (ref 0.1–1.3)
MONOCYTES NFR BLD AUTO: 5.5 %
N GONORRHOEA DNA SPEC QL NAA+PROBE: NOT DETECTED
NEUTROPHILS # BLD AUTO: 3.67 X10(3)/MCL (ref 2.1–9.2)
NEUTROPHILS NFR BLD AUTO: 54.8 %
NRBC BLD AUTO-RTO: 0 % (ref 0–1)
PLATELET # BLD AUTO: 334 X10(3)/MCL (ref 140–371)
PMV BLD AUTO: 9.5 FL (ref 9.4–12.4)
POTASSIUM SERPL-SCNC: 3.9 MMOL/L (ref 3.5–5.1)
PROT SERPL-MCNC: 7.2 GM/DL (ref 6.4–8.3)
RBC # BLD AUTO: 4.72 X10(6)/MCL (ref 4.2–5.4)
SODIUM SERPL-SCNC: 139 MMOL/L (ref 136–145)
T PALLIDUM AB SER QL: NONREACTIVE
TRIGL SERPL-MCNC: 150 MG/DL (ref 37–140)
TSH SERPL-ACNC: 1.85 UIU/ML (ref 0.35–4.94)
VLDLC SERPL CALC-MCNC: 30 MG/DL
WBC # SPEC AUTO: 6.7 X10(3)/MCL (ref 4.5–11.5)

## 2022-12-15 PROCEDURE — 80074 ACUTE HEPATITIS PANEL: CPT

## 2022-12-15 PROCEDURE — 87591 N.GONORRHOEAE DNA AMP PROB: CPT

## 2022-12-15 PROCEDURE — 85025 COMPLETE CBC W/AUTO DIFF WBC: CPT

## 2022-12-15 PROCEDURE — 87491 CHLMYD TRACH DNA AMP PROBE: CPT

## 2022-12-15 PROCEDURE — 80061 LIPID PANEL: CPT

## 2022-12-15 PROCEDURE — 80053 COMPREHEN METABOLIC PANEL: CPT

## 2022-12-15 PROCEDURE — 87389 HIV-1 AG W/HIV-1&-2 AB AG IA: CPT

## 2022-12-15 PROCEDURE — 86780 TREPONEMA PALLIDUM: CPT

## 2022-12-15 PROCEDURE — 36415 COLL VENOUS BLD VENIPUNCTURE: CPT

## 2022-12-15 PROCEDURE — 83036 HEMOGLOBIN GLYCOSYLATED A1C: CPT

## 2022-12-15 PROCEDURE — 84443 ASSAY THYROID STIM HORMONE: CPT

## 2022-12-15 NOTE — TELEPHONE ENCOUNTER
Please let patient know lab results reviewed and all look within acceptable levels/ wnl.     Thank you

## 2022-12-16 LAB — PATH REV: NORMAL

## 2022-12-22 ENCOUNTER — OFFICE VISIT (OUTPATIENT)
Dept: BEHAVIORAL HEALTH | Facility: CLINIC | Age: 27
End: 2022-12-22
Payer: MEDICAID

## 2022-12-22 VITALS
DIASTOLIC BLOOD PRESSURE: 73 MMHG | SYSTOLIC BLOOD PRESSURE: 107 MMHG | BODY MASS INDEX: 39.45 KG/M2 | WEIGHT: 244.38 LBS | TEMPERATURE: 98 F | HEART RATE: 62 BPM

## 2022-12-22 DIAGNOSIS — F33.1 MODERATE EPISODE OF RECURRENT MAJOR DEPRESSIVE DISORDER: Primary | Chronic | ICD-10-CM

## 2022-12-22 DIAGNOSIS — F41.1 GAD (GENERALIZED ANXIETY DISORDER): Chronic | ICD-10-CM

## 2022-12-22 DIAGNOSIS — F43.10 PTSD (POST-TRAUMATIC STRESS DISORDER): Chronic | ICD-10-CM

## 2022-12-22 DIAGNOSIS — G47.00 INSOMNIA, UNSPECIFIED TYPE: ICD-10-CM

## 2022-12-22 PROCEDURE — 3078F DIAST BP <80 MM HG: CPT | Mod: AF,HB,CPTII, | Performed by: STUDENT IN AN ORGANIZED HEALTH CARE EDUCATION/TRAINING PROGRAM

## 2022-12-22 PROCEDURE — 1159F PR MEDICATION LIST DOCUMENTED IN MEDICAL RECORD: ICD-10-PCS | Mod: AF,HB,CPTII, | Performed by: STUDENT IN AN ORGANIZED HEALTH CARE EDUCATION/TRAINING PROGRAM

## 2022-12-22 PROCEDURE — 3008F BODY MASS INDEX DOCD: CPT | Mod: AF,HB,CPTII, | Performed by: STUDENT IN AN ORGANIZED HEALTH CARE EDUCATION/TRAINING PROGRAM

## 2022-12-22 PROCEDURE — 3074F PR MOST RECENT SYSTOLIC BLOOD PRESSURE < 130 MM HG: ICD-10-PCS | Mod: AF,HB,CPTII, | Performed by: STUDENT IN AN ORGANIZED HEALTH CARE EDUCATION/TRAINING PROGRAM

## 2022-12-22 PROCEDURE — 3074F SYST BP LT 130 MM HG: CPT | Mod: AF,HB,CPTII, | Performed by: STUDENT IN AN ORGANIZED HEALTH CARE EDUCATION/TRAINING PROGRAM

## 2022-12-22 PROCEDURE — 99214 OFFICE O/P EST MOD 30 MIN: CPT | Mod: AF,HB,S$PBB, | Performed by: STUDENT IN AN ORGANIZED HEALTH CARE EDUCATION/TRAINING PROGRAM

## 2022-12-22 PROCEDURE — 3008F PR BODY MASS INDEX (BMI) DOCUMENTED: ICD-10-PCS | Mod: AF,HB,CPTII, | Performed by: STUDENT IN AN ORGANIZED HEALTH CARE EDUCATION/TRAINING PROGRAM

## 2022-12-22 PROCEDURE — 99214 PR OFFICE/OUTPT VISIT, EST, LEVL IV, 30-39 MIN: ICD-10-PCS | Mod: AF,HB,S$PBB, | Performed by: STUDENT IN AN ORGANIZED HEALTH CARE EDUCATION/TRAINING PROGRAM

## 2022-12-22 PROCEDURE — 1160F RVW MEDS BY RX/DR IN RCRD: CPT | Mod: AF,HB,CPTII, | Performed by: STUDENT IN AN ORGANIZED HEALTH CARE EDUCATION/TRAINING PROGRAM

## 2022-12-22 PROCEDURE — 99213 OFFICE O/P EST LOW 20 MIN: CPT | Mod: PBBFAC,PN | Performed by: STUDENT IN AN ORGANIZED HEALTH CARE EDUCATION/TRAINING PROGRAM

## 2022-12-22 PROCEDURE — 3078F PR MOST RECENT DIASTOLIC BLOOD PRESSURE < 80 MM HG: ICD-10-PCS | Mod: AF,HB,CPTII, | Performed by: STUDENT IN AN ORGANIZED HEALTH CARE EDUCATION/TRAINING PROGRAM

## 2022-12-22 PROCEDURE — 1160F PR REVIEW ALL MEDS BY PRESCRIBER/CLIN PHARMACIST DOCUMENTED: ICD-10-PCS | Mod: AF,HB,CPTII, | Performed by: STUDENT IN AN ORGANIZED HEALTH CARE EDUCATION/TRAINING PROGRAM

## 2022-12-22 PROCEDURE — 1159F MED LIST DOCD IN RCRD: CPT | Mod: AF,HB,CPTII, | Performed by: STUDENT IN AN ORGANIZED HEALTH CARE EDUCATION/TRAINING PROGRAM

## 2022-12-22 RX ORDER — PRAZOSIN HYDROCHLORIDE 2 MG/1
2 CAPSULE ORAL NIGHTLY
Qty: 30 CAPSULE | Refills: 5 | Status: SHIPPED | OUTPATIENT
Start: 2022-12-22 | End: 2023-03-21 | Stop reason: SDUPTHER

## 2022-12-22 RX ORDER — CLONAZEPAM 0.5 MG/1
0.5 TABLET ORAL 2 TIMES DAILY
Qty: 60 TABLET | Refills: 0 | Status: SHIPPED | OUTPATIENT
Start: 2022-12-22 | End: 2023-02-06 | Stop reason: ALTCHOICE

## 2022-12-22 RX ORDER — TRAZODONE HYDROCHLORIDE 100 MG/1
TABLET ORAL
Qty: 60 TABLET | Refills: 5 | Status: SHIPPED | OUTPATIENT
Start: 2022-12-22 | End: 2023-07-06 | Stop reason: SDUPTHER

## 2022-12-22 NOTE — PROGRESS NOTES
"Outpatient Psychiatry Follow-Up Visit    12/22/2022    Clinical Status of Patient:  Outpatient (Ambulatory)    Chief Complaint:  Salma Montero is a 27 y.o. female who presents today for follow-up of MDD, FLORENCE, PTSD, insomnia. Patient last seen for follow-up on 11/7/2022. Met with patient.      Interval History and Content of Current Session:  Interim Events/Subjective Report/Content of Current Session:   Pt reports doing "ok" since last visit.  Still feeling stressed by her grandmother's declining health.  Notes anxiety and mood are relatively unchanged.  Sleeping poorly, notes trazodone is working "ok" but sometimes inadequate.  Denies problems with appetite.  Energy low, pt attributes to sleep.  Denies SI/HI/AVH/paranoia, denies plan or desire for self harm or harm to others.  Denies SE from current regimen.  Denies change in somatic complaints.  Pt reports that she wants to discontinue her birth control in February 2023 and wants to be off of klonopin at that time.  Pt voices interest in adjustments to regimen.     Review of Systems   PSYCHIATRIC: Pertinant items are noted in the narrative.  CONSTITUTIONAL: No weight gain or loss.  HEENT: +jaw clenching at night, +clicking of jaw with mouth opening/closing  MUSCULOSKELETAL: No pain or stiffness of the joints.  NEUROLOGIC: No weakness, sensory changes, seizures, confusion, memory loss, tremor or other abnormal movements.  +LOC. +HA.   RESPIRATORY: No shortness of breath.  CARDIOVASCULAR: No tachycardia or chest pain.  +palpitations.   GASTROINTESTINAL: denies vomiting poor appetite, diarrhea.  +heartburn.     Past Medical, Family and Social History: The patient's past medical, family and social history have been reviewed and updated as appropriate within the electronic medical record - see encounter notes.    Compliance: good    Side effects: denies    Risk Parameters:  Patient reports no suicidal ideation  Patient reports no homicidal ideation  Patient " "reports no self-injurious behavior  Patient reports no violent behavior    Exam (detailed: at least 9 elements; comprehensive: all 15 elements)   Constitutional  Vitals:  Most recent vital signs, dated less than 90 days prior to this appointment, were reviewed.     Vitals:    12/22/22 1136   BP: 107/73   Pulse: 62   Temp: 98.3 °F (36.8 °C)   TempSrc: Oral   Weight: 110.9 kg (244 lb 6.4 oz)        General:   Constitutional: No acute distress, appears stated age, casually dressed    Neurologic:   Motor: moves all extremities spontaneously and without difficulty, no abnormal involuntary movements observed  Gait: normal gait and station    Mental status examination:   Appearance: appears stated age, casually dressed, no acute distress  Behavior: unremarkable for situation, calm and cooperative  Mood: "ok"  Affect: mood congruent, constricted, brighter  Thought process: linear and goal directed  Associations: appropriate for conversation  Thought content: no plan or desire for self harm or harm to others, denies paranoia, no delusional ideation volunteered  Perceptions: denies hallucinations or other altered perceptions  Orientation: oriented to day of week, month, year, location and situation  Language: English, fluid  Attention: able to attend to interview  Insight: good  Judgement: good    PHQ9 12/22/2022   Total Score 9     GAD7 12/22/2022 9/27/2022 8/25/2022   1. Feeling nervous, anxious, or on edge? 3 3 3   2. Not being able to stop or control worrying? 0 3 3   3. Worrying too much about different things? 3 3 3   4. Trouble relaxing? 1 3 1   5. Being so restless that it is hard to sit still? 0 3 1   6. Becoming easily annoyed or irritable? 2 3 1   7. Feeling afraid as if something awful might happen? 1 3 1   8. If you checked off any problems, how difficult have these problems made it for you to do your work, take care of things at home, or get along with other people? 2 3 3   FLORENCE-7 Score 10 21 13     Assessment " and Diagnosis   Status/Progress: Based on the examination today, the patient's problem(s) is/are worsening.  New problems have not been presented today.   Co-morbidities and Lack of compliance are not complicating management of the primary condition.       General Impression:    ICD-10-CM ICD-9-CM   1. Moderate episode of recurrent major depressive disorder  F33.1 296.32   2. FLORENCE (generalized anxiety disorder)  F41.1 300.02   3. PTSD (post-traumatic stress disorder)  F43.10 309.81   4. Insomnia, unspecified type  G47.00 780.52     - R/o cluster B personality     Intervention/Counseling/Treatment Plan   Continue klonopin 0.5mg bid, discussed options for gradual downtitration, discussed risks of sudden discontinuation including life threatening withdrawals  Increase trazodone to 100-200mg nightly prn insomnia  Continue trileptal 300mg bid  Continue trintellix 20mg daily  Continue prazosin 2mg nightly for now  Recommended pt continue to follow up with other providers  CBC and CMP unremarkable, TSH wnl, UDS +cannabis and +benzo  No need for PEC as pt is not an imminent danger to self or others or gravely disabled due to acute psychiatric illness  Discussed that pt should either call clinic for psychiatric crisis symptoms or present to nearest emergency room    Discussed with patient informed consent including diagnosis, risks and benefits of proposed treatment above vs. alternative treatments vs. no treatment, as well as serious and common side effects of these treatments, and the inherent unpredictability of individual responses to these treatments. The patient expresses understanding of the above and displays the capacity to agree with this current plan. Patient also agrees that, currently, the benefits outweigh the risks and would like to pursue treatment at this time, and had no other questions.    Instructions:  Take all medications as prescribed.    Abstain from recreational drugs and alcohol.  Present to ED or  call 911 for SI/HI plan or intent, psychosis, or medical emergency.    Return to Clinic: Follow up in about 6 weeks (around 2/2/2023).    Total time: Total time spent with patient 25 minutes.     Damian Ward MD  [facility]

## 2022-12-23 ENCOUNTER — TELEPHONE (OUTPATIENT)
Dept: BEHAVIORAL HEALTH | Facility: CLINIC | Age: 27
End: 2022-12-23
Payer: MEDICAID

## 2023-02-06 ENCOUNTER — OFFICE VISIT (OUTPATIENT)
Dept: BEHAVIORAL HEALTH | Facility: CLINIC | Age: 28
End: 2023-02-06
Payer: MEDICAID

## 2023-02-06 VITALS
OXYGEN SATURATION: 100 % | WEIGHT: 242.13 LBS | SYSTOLIC BLOOD PRESSURE: 111 MMHG | HEART RATE: 87 BPM | BODY MASS INDEX: 39.08 KG/M2 | TEMPERATURE: 98 F | DIASTOLIC BLOOD PRESSURE: 77 MMHG

## 2023-02-06 DIAGNOSIS — F43.10 PTSD (POST-TRAUMATIC STRESS DISORDER): Chronic | ICD-10-CM

## 2023-02-06 DIAGNOSIS — F33.1 MODERATE EPISODE OF RECURRENT MAJOR DEPRESSIVE DISORDER: Primary | Chronic | ICD-10-CM

## 2023-02-06 DIAGNOSIS — F41.1 GAD (GENERALIZED ANXIETY DISORDER): Chronic | ICD-10-CM

## 2023-02-06 PROCEDURE — 1159F PR MEDICATION LIST DOCUMENTED IN MEDICAL RECORD: ICD-10-PCS | Mod: CPTII,,, | Performed by: STUDENT IN AN ORGANIZED HEALTH CARE EDUCATION/TRAINING PROGRAM

## 2023-02-06 PROCEDURE — 99213 OFFICE O/P EST LOW 20 MIN: CPT | Mod: PBBFAC,PN | Performed by: STUDENT IN AN ORGANIZED HEALTH CARE EDUCATION/TRAINING PROGRAM

## 2023-02-06 PROCEDURE — 99213 OFFICE O/P EST LOW 20 MIN: CPT | Mod: AF,HB,S$PBB, | Performed by: STUDENT IN AN ORGANIZED HEALTH CARE EDUCATION/TRAINING PROGRAM

## 2023-02-06 PROCEDURE — 99213 PR OFFICE/OUTPT VISIT, EST, LEVL III, 20-29 MIN: ICD-10-PCS | Mod: AF,HB,S$PBB, | Performed by: STUDENT IN AN ORGANIZED HEALTH CARE EDUCATION/TRAINING PROGRAM

## 2023-02-06 PROCEDURE — 3008F BODY MASS INDEX DOCD: CPT | Mod: CPTII,,, | Performed by: STUDENT IN AN ORGANIZED HEALTH CARE EDUCATION/TRAINING PROGRAM

## 2023-02-06 PROCEDURE — 3078F DIAST BP <80 MM HG: CPT | Mod: CPTII,,, | Performed by: STUDENT IN AN ORGANIZED HEALTH CARE EDUCATION/TRAINING PROGRAM

## 2023-02-06 PROCEDURE — 3078F PR MOST RECENT DIASTOLIC BLOOD PRESSURE < 80 MM HG: ICD-10-PCS | Mod: CPTII,,, | Performed by: STUDENT IN AN ORGANIZED HEALTH CARE EDUCATION/TRAINING PROGRAM

## 2023-02-06 PROCEDURE — 3074F PR MOST RECENT SYSTOLIC BLOOD PRESSURE < 130 MM HG: ICD-10-PCS | Mod: CPTII,,, | Performed by: STUDENT IN AN ORGANIZED HEALTH CARE EDUCATION/TRAINING PROGRAM

## 2023-02-06 PROCEDURE — 1160F RVW MEDS BY RX/DR IN RCRD: CPT | Mod: CPTII,,, | Performed by: STUDENT IN AN ORGANIZED HEALTH CARE EDUCATION/TRAINING PROGRAM

## 2023-02-06 PROCEDURE — 3074F SYST BP LT 130 MM HG: CPT | Mod: CPTII,,, | Performed by: STUDENT IN AN ORGANIZED HEALTH CARE EDUCATION/TRAINING PROGRAM

## 2023-02-06 PROCEDURE — 3008F PR BODY MASS INDEX (BMI) DOCUMENTED: ICD-10-PCS | Mod: CPTII,,, | Performed by: STUDENT IN AN ORGANIZED HEALTH CARE EDUCATION/TRAINING PROGRAM

## 2023-02-06 PROCEDURE — 1160F PR REVIEW ALL MEDS BY PRESCRIBER/CLIN PHARMACIST DOCUMENTED: ICD-10-PCS | Mod: CPTII,,, | Performed by: STUDENT IN AN ORGANIZED HEALTH CARE EDUCATION/TRAINING PROGRAM

## 2023-02-06 PROCEDURE — 1159F MED LIST DOCD IN RCRD: CPT | Mod: CPTII,,, | Performed by: STUDENT IN AN ORGANIZED HEALTH CARE EDUCATION/TRAINING PROGRAM

## 2023-02-06 RX ORDER — OXCARBAZEPINE 300 MG/1
300 TABLET, FILM COATED ORAL 2 TIMES DAILY
Qty: 60 TABLET | Refills: 5 | Status: SHIPPED | OUTPATIENT
Start: 2023-02-06 | End: 2023-07-06 | Stop reason: SDUPTHER

## 2023-02-06 NOTE — PROGRESS NOTES
"Outpatient Psychiatry Follow-Up Visit    2/6/2023    Clinical Status of Patient:  Outpatient (Ambulatory)    Chief Complaint:  Salma Montero is a 27 y.o. female who presents today for follow-up of MDD, FLORENCE, PTSD, insomnia. Patient last seen for follow-up on 12/22/2022. Met with patient.      Interval History and Content of Current Session:  Interim Events/Subjective Report/Content of Current Session:   Pt reports doing "good" since last visit.  Reports mood is stable.  Anxiety present, reports difficulty with managing anxiety.  Sleeping better, trazodone effective.  Appetite low,  weight fluctuates.  Energy fair but lower than preferred.  Motivation good.  Denies SI/HI/AVH/paranoia, denies plan or desire for self harm or harm to others.  Denies SI/HI/AVH/paranoia, denies plan or desire for self harm or harm to others.  Pt happy with current medication regimen and voices desire to establish with a psychotherapist.     Review of Systems   PSYCHIATRIC: Pertinant items are noted in the narrative.  CONSTITUTIONAL: No weight gain or loss.  HEENT: +jaw clenching at night, +clicking of jaw with mouth opening/closing   MUSCULOSKELETAL: No pain or stiffness of the joints.  NEUROLOGIC: No weakness, sensory changes, seizures, confusion, memory loss, tremor or other abnormal movements.  +LOC (resolved). +HA (improved).   RESPIRATORY: No shortness of breath.  CARDIOVASCULAR: No tachycardia or chest pain.  +palpitations (better).   GASTROINTESTINAL: denies vomiting poor appetite, diarrhea.  +heartburn.     Past Medical, Family and Social History: The patient's past medical, family and social history have been reviewed and updated as appropriate within the electronic medical record - see encounter notes.    Compliance: good    Side effects: denies    Risk Parameters:  Patient reports no suicidal ideation  Patient reports no homicidal ideation  Patient reports no self-injurious behavior  Patient reports no violent " "behavior    Exam (detailed: at least 9 elements; comprehensive: all 15 elements)   Constitutional  Vitals:  Most recent vital signs, dated less than 90 days prior to this appointment, were reviewed.     Vitals:    02/06/23 1354   BP: 111/77   Pulse: 87   Temp: 98.3 °F (36.8 °C)   SpO2: 100%   Weight: 109.8 kg (242 lb 1.6 oz)        General:   Constitutional: No acute distress, appears stated age, casually dressed    Neurologic:   Motor: moves all extremities spontaneously and without difficulty, no abnormal involuntary movements observed  Gait: normal gait and station    Mental status examination:   Appearance: appears stated age, casually dressed, no acute distress  Behavior: unremarkable for situation, calm and cooperative  Mood: "ok"  Affect: mood congruent, constricted, brighter  Thought process: linear and goal directed  Associations: appropriate for conversation  Thought content: no plan or desire for self harm or harm to others, denies paranoia, no delusional ideation volunteered  Perceptions: denies hallucinations or other altered perceptions  Orientation: oriented to day of week, month, year, location and situation  Language: English, fluid  Attention: able to attend to interview  Insight: good  Judgement: good    PHQ9 2/6/2023   Total Score 10     GAD7 2/6/2023 12/22/2022 9/27/2022   1. Feeling nervous, anxious, or on edge? 2 3 3   2. Not being able to stop or control worrying? 1 0 3   3. Worrying too much about different things? 3 3 3   4. Trouble relaxing? 0 1 3   5. Being so restless that it is hard to sit still? 0 0 3   6. Becoming easily annoyed or irritable? 0 2 3   7. Feeling afraid as if something awful might happen? 0 1 3   8. If you checked off any problems, how difficult have these problems made it for you to do your work, take care of things at home, or get along with other people? 1 2 3   FLORENCE-7 Score 6 10 21     Assessment and Diagnosis   Status/Progress: Based on the examination today, the " patient's problem(s) is/are improved.  New problems have not been presented today.   Co-morbidities and Lack of compliance are not complicating management of the primary condition.       General Impression:    ICD-10-CM ICD-9-CM   1. Moderate episode of recurrent major depressive disorder  F33.1 296.32   2. FLORENCE (generalized anxiety disorder)  F41.1 300.02   3. PTSD (post-traumatic stress disorder)  F43.10 309.81     - R/o cluster B personality     Intervention/Counseling/Treatment Plan   Continue trazodone 100-200mg nightly prn insomnia  Continue trileptal 300mg bid  Continue trintellix 20mg daily  Continue prazosin 2mg nightly for now  Recommended pt continue to follow up with other providers, will refer for in-house psychotherapy  CBC and CMP unremarkable, TSH wnl, UDS +cannabis and +benzo  No need for PEC as pt is not an imminent danger to self or others or gravely disabled due to acute psychiatric illness  Discussed that pt should either call clinic for psychiatric crisis symptoms or present to nearest emergency room    Discussed with patient informed consent including diagnosis, risks and benefits of proposed treatment above vs. alternative treatments vs. no treatment, as well as serious and common side effects of these treatments, and the inherent unpredictability of individual responses to these treatments. The patient expresses understanding of the above and displays the capacity to agree with this current plan. Patient also agrees that, currently, the benefits outweigh the risks and would like to pursue treatment at this time, and had no other questions.    Instructions:  Take all medications as prescribed.    Abstain from recreational drugs and alcohol.  Present to ED or call 911 for SI/HI plan or intent, psychosis, or medical emergency.    Return to Clinic: Follow up in about 6 weeks (around 3/20/2023).    Total time: Total time spent with patient 18 minutes.     Damian Ward MD  [facility]  Center

## 2023-02-14 ENCOUNTER — TELEPHONE (OUTPATIENT)
Dept: BEHAVIORAL HEALTH | Facility: CLINIC | Age: 28
End: 2023-02-14

## 2023-02-14 ENCOUNTER — TELEPHONE (OUTPATIENT)
Dept: BEHAVIORAL HEALTH | Facility: CLINIC | Age: 28
End: 2023-02-14
Payer: MEDICAID

## 2023-02-14 NOTE — TELEPHONE ENCOUNTER
Called and let pt know that letter was in the . Pt requested I fax letter to [facility].  [phone].

## 2023-02-14 NOTE — LETTER
February 14, 2023    Salma Montero  [address]  [facility] [address]     To whom it may concern,    Ms. Salma Montero is currently under my treatment at [facility].  She has been diagnosed with depression (F33.1), anxiety (F41.1), and post traumatic stress disorder (F43.10).  She has been scheduled for jury duty on 3/6/2023.  She may have some difficulty with jury duty due to her anxiety and depression symptoms.  Please consider excusing her from jury duty obligations.  If you have any questions, please feel free to contact my office at your convenience.      Sincerely,            Damian Ward MD  [facility]    [address] [address]  Phone: [phone]  Fax: [phone]

## 2023-02-23 ENCOUNTER — HOSPITAL ENCOUNTER (OUTPATIENT)
Dept: CARDIOLOGY | Facility: HOSPITAL | Age: 28
Discharge: HOME | End: 2023-02-23
Attending: NURSE PRACTITIONER
Payer: MEDICAID

## 2023-02-23 DIAGNOSIS — R55 SYNCOPE, UNSPECIFIED SYNCOPE TYPE: ICD-10-CM

## 2023-02-23 DIAGNOSIS — R00.2 HEART PALPITATIONS: Chronic | ICD-10-CM

## 2023-02-23 PROCEDURE — 93229 REMOTE 30 DAY ECG TECH SUPP: CPT

## 2023-03-01 ENCOUNTER — PATIENT OUTREACH (OUTPATIENT)
Dept: ADMINISTRATIVE | Facility: HOSPITAL | Age: 28
End: 2023-03-01
Payer: MEDICAID

## 2023-03-01 NOTE — PROGRESS NOTES
Population Health Outreach. Chart Review.   The following record(s) below have been received.    Pap Smear /HPV 6.30.2021    Record(s) will be hyperlinked into chart for Health Maintenance.

## 2023-03-14 ENCOUNTER — OFFICE VISIT (OUTPATIENT)
Dept: CARDIOLOGY | Facility: CLINIC | Age: 28
End: 2023-03-14
Payer: MEDICAID

## 2023-03-14 VITALS
OXYGEN SATURATION: 99 % | WEIGHT: 242.94 LBS | HEART RATE: 78 BPM | BODY MASS INDEX: 39.04 KG/M2 | TEMPERATURE: 99 F | DIASTOLIC BLOOD PRESSURE: 75 MMHG | RESPIRATION RATE: 20 BRPM | HEIGHT: 66 IN | SYSTOLIC BLOOD PRESSURE: 108 MMHG

## 2023-03-14 DIAGNOSIS — R00.0 TACHYCARDIA: ICD-10-CM

## 2023-03-14 DIAGNOSIS — R55 SYNCOPE, UNSPECIFIED SYNCOPE TYPE: ICD-10-CM

## 2023-03-14 DIAGNOSIS — R00.2 HEART PALPITATIONS: Primary | Chronic | ICD-10-CM

## 2023-03-14 DIAGNOSIS — R42 ORTHOSTATIC DIZZINESS: ICD-10-CM

## 2023-03-14 DIAGNOSIS — E78.00 ELEVATED LOW DENSITY LIPOPROTEIN (LDL) CHOLESTEROL LEVEL: ICD-10-CM

## 2023-03-14 PROCEDURE — 99215 OFFICE O/P EST HI 40 MIN: CPT | Mod: PBBFAC | Performed by: STUDENT IN AN ORGANIZED HEALTH CARE EDUCATION/TRAINING PROGRAM

## 2023-03-14 NOTE — PROGRESS NOTES
[facility]   Cardiology Clinic - Follow Up     Date of Visit: 3/14/2023  Reason for Visit/Chief Complaint:   Chief Complaint    f/u visit, 30day event monitor results; Dizziness; Palpitations          I have explained to Ms. Salma Montero that I am not a cardiologist. She understands that I am an internal medicine physician seeing patients in the cardiology clinic. Ms. Salma Montero has expressed understanding of this fact and is willing to proceed with this visit.     The patient was discussed with the cardiologist at the time of the appointment.     History of Present Illness:      Salma Montero is a 27 y.o. female with a PMH significant for anxiety, depression and GERD presents for follow up and ongoing care.  Patient was originally referred to Cardiology Clinic for evaluation of palpitations and syncope.  Of note, the patient endorses a long standing history of palpitations since age 14, but reported progression of symptoms with associated chest pressure, dizziness, and syncope.  She has had extesnive testing, all of which is unremarkable. Complete list of workup is as below. Today she reports continued issues with syncope. She reports pre-syncope anytime she lifts her hands above her head while seating. She reports syncope with raising her hands above her head while standing. She describes the episodes as having lost control over her arms and feels a tingling sensation throughout her body before passing out. We discussed a referral to [facility] for autonomic testing today. She is willing to proceed.     Past Medical History:        Past Medical History:   Diagnosis Date    Dizziness     GERD (gastroesophageal reflux disease)     Headache     Obesity     Palpitations        Surgical History:        Past Surgical History:   Procedure Laterality Date    TONSILLECTOMY AND ADENOIDECTOMY      UPPER GASTROINTESTINAL ENDOSCOPY         Family History:        Family History    Problem Relation Age of Onset    Heart failure Father     Bipolar disorder Sister     Crohn's disease Sister     Cancer Maternal Grandmother     Anxiety disorder Maternal Grandmother     Alzheimer's disease Maternal Grandmother     Heart failure Maternal Grandfather     Diabetes Maternal Grandfather     Skin cancer Maternal Grandfather     Bipolar disorder Cousin     Bipolar disorder Cousin        Social History:        Social History     Tobacco Use    Smoking status: Never    Smokeless tobacco: Never   Substance Use Topics    Alcohol use: Yes     Alcohol/week: 2.0 standard drinks     Types: 2 Shots of liquor per week     Comment: once a month    Drug use: Yes     Frequency: 21.0 times per week     Types: Marijuana       Allergies:         Review of patient's allergies indicates:   Allergen Reactions    Corn meal-luffa cylindrica frt Anaphylaxis, Hives, Itching and Rash    Orange Hives and Itching    Sulfa (sulfonamide antibiotics) Rash, Hives and Itching       Medications:        Current Outpatient Medications   Medication Sig Dispense Refill    cholecalciferol, vitamin D3, (VITAMIN D3) 50 mcg (2,000 unit) Cap capsule Take 50 mcg by mouth once daily.      famotidine-calcium carbonate-magnesium hydroxide (PEPCID COMPLETE) -165 mg Take 1 tablet by mouth daily as needed.      inulin-chromium picolinate (FIBER SELECT GUMMIES) 2-100 gram-mcg Chew Take 2 tablets by mouth once daily.      L norgest/e.estradioL-e.estrad (SEASONIQUE) 0.15 mg-30 mcg (84)/10 mcg (7) 3MPk Take 1 tablet by mouth once daily.      loratadine (CLARITIN) 10 mg tablet Take 10 mg by mouth once daily.      metoprolol succinate (TOPROL-XL) 100 MG 24 hr tablet Take 1 tablet (100 mg total) by mouth every evening. 30 tablet 4    OXcarbazepine (TRILEPTAL) 300 MG Tab Take 1 tablet (300 mg total) by mouth 2 (two) times daily. 60 tablet 5    pantoprazole (PROTONIX) 40 MG tablet Take 1 tablet (40 mg total) by mouth once daily. 90 tablet 3    prazosin  (MINIPRESS) 2 MG Cap Take 1 capsule (2 mg total) by mouth every evening. 30 capsule 5    promethazine (PHENERGAN) 12.5 MG Tab Take 12.5 mg by mouth as needed.      traZODone (DESYREL) 100 MG tablet Take 100-200mg by mouth nightly as needed for insomnia. 60 tablet 5    vortioxetine (TRINTELLIX) 20 mg Tab Take 1 tablet (20 mg total) by mouth Daily. 30 tablet 5    baclofen (LIORESAL) 10 MG tablet Take 1 tablet (10 mg total) by mouth 2 (two) times daily. (Patient not taking: Reported on 3/14/2023) 60 tablet 4    glycopyrrolate (ROBINUL) 1 mg Tab Take 1 mg by mouth once daily.       No current facility-administered medications for this visit.       I have reviewed and updated the patient's medications, allergies, past medical history, surgical history, social history and family history as needed.    Review of Systems:      Review of Systems   Constitutional:  Positive for malaise/fatigue. Negative for chills, diaphoresis and fever.   HENT:  Negative for hearing loss and nosebleeds.    Eyes:  Negative for blurred vision.   Respiratory:  Negative for shortness of breath.    Cardiovascular:  Positive for palpitations. Negative for chest pain, orthopnea, claudication and PND.   Gastrointestinal:  Negative for nausea and vomiting.   Genitourinary:  Negative for dysuria.   Musculoskeletal:  Negative for myalgias.   Skin:  Negative for rash.   Neurological:  Positive for dizziness and weakness. Negative for headaches.        Pre-syncope and syncope     Objective:        Vitals:    03/14/23 1054   BP: 108/75   Pulse: 78   Resp: 20   Temp: 98.5 °F (36.9 °C)     Wt Readings from Last 3 Encounters:   03/14/23 110.2 kg (242 lb 15.2 oz)   02/06/23 109.8 kg (242 lb 1.6 oz)   12/22/22 110.9 kg (244 lb 6.4 oz)     Temp Readings from Last 3 Encounters:   03/14/23 98.5 °F (36.9 °C) (Oral)   02/06/23 98.3 °F (36.8 °C)   12/22/22 98.3 °F (36.8 °C) (Oral)     BP Readings from Last 3 Encounters:   03/14/23 108/75   02/06/23 111/77   12/22/22  "107/73     Pulse Readings from Last 3 Encounters:   03/14/23 78   02/06/23 87   12/22/22 62       Vitals:    03/14/23 1054   BP: 108/75   BP Location: Left arm   Patient Position: Sitting   BP Method: X-Large (Automatic)   Pulse: 78   Resp: 20   Temp: 98.5 °F (36.9 °C)   TempSrc: Oral   SpO2: 99%   Weight: 110.2 kg (242 lb 15.2 oz)   Height: 5' 6" (1.676 m)     Body mass index is 39.21 kg/m².    Physical Exam  Constitutional:       Appearance: She is well-developed.   HENT:      Head: Normocephalic and atraumatic.   Eyes:      Conjunctiva/sclera: Conjunctivae normal.   Neck:      Vascular: No carotid bruit or JVD.   Cardiovascular:      Rate and Rhythm: Normal rate and regular rhythm.      Chest Wall: PMI is not displaced.      Pulses: Normal pulses and intact distal pulses.      Heart sounds: No midsystolic click. No murmur heard.    No friction rub. No gallop.   Pulmonary:      Effort: Pulmonary effort is normal.      Breath sounds: Normal breath sounds.   Abdominal:      General: Abdomen is flat. Bowel sounds are normal.   Musculoskeletal:      Right lower leg: No edema.      Left lower leg: No edema.   Skin:     General: Skin is warm and dry.   Neurological:      Mental Status: She is alert. Mental status is at baseline.   Psychiatric:         Mood and Affect: Mood normal.         Behavior: Behavior normal. Behavior is cooperative.         Judgment: Judgment normal.        Labs:      I have reviewed the following labs below:      CBC:  Lab Results   Component Value Date    WBC 6.7 12/15/2022    HGB 13.5 12/15/2022    HCT 39.6 12/15/2022     12/15/2022    MCV 83.9 12/15/2022    RDW 13.3 12/15/2022     BMP:  Lab Results   Component Value Date     12/15/2022    K 3.9 12/15/2022    CO2 21 (L) 12/15/2022    BUN 6.9 (L) 12/15/2022    CALCIUM 8.9 12/15/2022     LFTs:  Lab Results   Component Value Date    ALBUMIN 3.6 12/15/2022    BILITOT 0.3 12/15/2022    AST 11 12/15/2022    ALKPHOS 111 12/15/2022    ALT " 14 12/15/2022     FLP:  Cholesterol Total   Date Value Ref Range Status   12/15/2022 179 <=200 mg/dL Final     HDL Cholesterol   Date Value Ref Range Status   12/15/2022 36 35 - 60 mg/dL Final     LDL Cholesterol   Date Value Ref Range Status   12/15/2022 113.00 50.00 - 140.00 mg/dL Final     Triglyceride   Date Value Ref Range Status   12/15/2022 150 (H) 37 - 140 mg/dL Final     DM:  Lab Results   Component Value Date    HGBA1C 5.1 12/15/2022    HGBA1C 5.1 10/20/2021    HGBA1C 5.4 09/22/2020    CREATININE 0.95 12/15/2022     Thyroid:  Lab Results   Component Value Date    TSH 1.852 12/15/2022         Cardiac Studies/Imaging:        I have reviewed the following studies below:      30 Day Event Monitor - 1/25/2023  Sinus Rhythm during symptoms. Patients is in sinus with normal or mildly elevated HR.      Tilt Table Test (performed at [facility]) - 10/20/2022  Findings:   Baselne BP HR: 136/86  87  Vital Signs:   Barber of BP is 70 degrees and maximum heart rate is 70 degrees, 94bpm, 122/86  Barber blood pressure with SL nitro maximum heart rate with SL Nitro 118bpm, 111/71    Impression: negative for neurocardiogenic syndrome       Carotid US - 9/12/2022  The right internal carotid artery demonstrated no hemodynamically significant stenosis.  The left internal carotid artery demonstrated no hemodynamically significant stenosis.  There was no substantial carotid plaque identified on this scan.   The bilateral vertebral arteries were patent with antegrade flow.       30 Day Event Monitor - 7/18/2022  Sinus rhythm, average HR 78 bpm, minimum 56 bpm and Max 134 bpm  No significant pauses  No PVCs or VT  1 SVT, 4 beats  No Afib  Reported symptoms during NSR      Stress Test  Results for orders placed during the hospital encounter of 06/29/22  Exercise Stress - EKG  Interpretation Summary    The EKG portion of this study is negative for ischemia.    The patient reported no chest pain during the stress test.    The blood pressure  response to stress was normal.    There were no arrhythmias during stress.  Minutes exercised:  5  - ST deviation:  0 mm  Angina:  no angina (0)  - Angina index:  0  [facility] treadmill score:  5  Patient has Pineda Treadmill Score = 5.  This indicates low risk for cardiovascular events (predicted 4 year survival is 99%       ECHO 3.15.22  Left ventricular ejection fraction measured approximately 55%  Structurally normal mitral valve  Structurally normal trileaflet aortic valve  Tricuspid valve is structurally normal  Trace tricuspid regurgitation  Pulmonary arterial systolic pressure is estimated to be normal  Pulmonic valve is structurally normal  Mild pulmonic regurgitation present  Normal size left atrium  Number right atrial size  Normal right ventricular chamber size and function                 Assessment & Plan:      27 y.o. female with the following medical problems:    Syncope & Collapse   - testing is as above  - most recent 30 day event monitor was unremarkable  - symptoms are concerning for carotid hypersensitivity  - will refer to Dr. Ismael Arredondo at [facility] for further evaluation and autonomic testing   - continue metoprolol   - counseled on hydration and compression socks     Palpitatons  - most recent 30-day event monitor was unremarkable  - continue metoprolol         Return to clinic in 4 months.      Future Appointments   Date Time Provider Department Center   3/16/2023  1:30 PM Afia Rodriguez LCSW [facility]   3/21/2023  8:30 AM Damian Ward MD [facility]   4/14/2023  9:45 AM Daly Colon MD [facility] NEURO [facility]    12/14/2023  9:00 AM Ariela Faye NP [facility] INTMED [facility]         Padmini Alex DO  Internal Medicine Physician   Department of [facility]   [facility]    03/14/2023 11:57 AM

## 2023-03-16 ENCOUNTER — OFFICE VISIT (OUTPATIENT)
Dept: BEHAVIORAL HEALTH | Facility: CLINIC | Age: 28
End: 2023-03-16
Payer: MEDICAID

## 2023-03-16 DIAGNOSIS — F43.10 PTSD (POST-TRAUMATIC STRESS DISORDER): Primary | ICD-10-CM

## 2023-03-16 DIAGNOSIS — F33.1 MODERATE EPISODE OF RECURRENT MAJOR DEPRESSIVE DISORDER: ICD-10-CM

## 2023-03-16 PROCEDURE — 90837 PR PSYCHOTHERAPY W/PATIENT, 60 MIN: ICD-10-PCS | Mod: AJ,HB,, | Performed by: COUNSELOR

## 2023-03-16 PROCEDURE — 90837 PSYTX W PT 60 MINUTES: CPT | Mod: AJ,HB,, | Performed by: COUNSELOR

## 2023-03-17 NOTE — PROGRESS NOTES
"PROGRESS NOTE  3/16/23 8:25 AM  Salma Montero  1995  [id number]       ENCOUNTER REASON/CHIEF COMPLAINT: 27 y.o. woman seen in follow up visit for PTSD, depression, and anxiety.  TARGET SYMPTOMS: depression, lack of focus, anxiety , dissociation, mood lability, guilt      ASSESSMENT AND GENERAL IMPRESSION:  Salma is a 27 y.o. woman seen today as a follow up visit for PTSD, depression, and anxiety.  Patient arrived at scheduled time for therapy session and was casually dressed and well-groomed.  Patient stated mood was congruent with affect and presentation in session.  Thought process was noted to be blocking and content consistent with mood; patient denies SI/HI as well as hallucinations.  Patient posture was frequently guarded throughout session, and patient demonstrated appropriate verbal and non-verbal attending for duration of session.  Patient required minimal redirection regarding thought process.  Patient goal progress is incremental at this time.  Salma verbalized benefit from intervention this date and agreed to continue current course of therapy in conjunction with clinician.        DIAGNOSIS:     ICD-10-CM ICD-9-CM   1. PTSD (post-traumatic stress disorder)  F43.10 309.81   2. Moderate episode of recurrent major depressive disorder  F33.1 296.32       RECOMMENDATIONS:        Provider recommends continued psychotherapy at current frequency.    FOLLOW UP: Patient scheduled to return in 1 week for follow up session.    HOMEWORK: Patient agrees to complete Guilt/Shame/Embarrassment assignment as discussed with provider.    INTERIM EVENTS AND SUBJECTIVE:   Patient reports "exhausted" mood and rates current depression a 8/10 and anxiety a 10/10.  Patient engaged in session as evidenced by: thoughtful and appropriate responses to probing questions, ready and willing discussion of assignments, and verbal and non-verbal attending. Salma reports increase distress related to anxiety since last session. " Patient notes continuous, marked, hypervigilance and sense of impending doom. Patient was able to identify recent conflict with family as well as medical testing results as antecedent/stressor and reports the following associated symptoms: guilt, low mood, isolation, and markedly increased anxiousness. Patient was able to identify meditation and thought mapping as interventions which they have found helpful in managing these symptoms.      RISK PARAMETER:   Patient reports no suicidal ideation  Patient reports no homicidal ideation  Patient reports no self-injurious behavior  Patient reports no violent behavior      MENTAL STATUS EXAM:  Appearance:   Neat  Behavior: : Cooperative with good eye contact and no noted psychomotor disturbances.  Speech/Language: increased latency and soft  Mood: Depressed and Anxious  Affect: Labile  Thought Process: blocking  Thought Content:  Denies current suicidal or homicidal ideation.  No obsessions, delusions, or paranoid ideation to note.  Perceptions:  Denies hallucinations  Cognitive:  Oriented to person, place and time.  No difficulty with recall of recent or remote events during interactions.  Able to attend.  Concentration adequate.  Fund of knowledge is average and in keeping with educational background.    Insight:  fair  Judgment: Fair    INTERVENTION: Cognitive Behavioral Therapy        Response to Intervention:  motivated.    Treatment Goals:     Depression: identify/address cognitive distortions, decrease rumination (<30min/day), increase self-care activities (>1hr/day), increase socialization (>1hr/week), increasing                                               energy, increasing interest in usual activities, increasing motivation, and reducing fatigue    PTSD: identify/address cognitive distortions, practice calming coping skills re: meditation/breathing/grounding (>15min/day), increase trigger           awareness and self-efficacy in sxs management, decrease  avoidance behavior (<3 incidents/week)            Goal Progress: Progressing    TIME:  The total time for services performed on the date of the encounter: 65 minutes

## 2023-03-21 ENCOUNTER — OFFICE VISIT (OUTPATIENT)
Dept: BEHAVIORAL HEALTH | Facility: CLINIC | Age: 28
End: 2023-03-21
Payer: MEDICAID

## 2023-03-21 VITALS
BODY MASS INDEX: 38.72 KG/M2 | TEMPERATURE: 98 F | DIASTOLIC BLOOD PRESSURE: 83 MMHG | WEIGHT: 239.88 LBS | SYSTOLIC BLOOD PRESSURE: 136 MMHG | OXYGEN SATURATION: 100 % | HEART RATE: 80 BPM

## 2023-03-21 DIAGNOSIS — F43.10 PTSD (POST-TRAUMATIC STRESS DISORDER): Chronic | ICD-10-CM

## 2023-03-21 DIAGNOSIS — F41.1 GAD (GENERALIZED ANXIETY DISORDER): Chronic | ICD-10-CM

## 2023-03-21 DIAGNOSIS — F33.1 MODERATE EPISODE OF RECURRENT MAJOR DEPRESSIVE DISORDER: Primary | Chronic | ICD-10-CM

## 2023-03-21 PROCEDURE — 3075F SYST BP GE 130 - 139MM HG: CPT | Mod: CPTII,,, | Performed by: STUDENT IN AN ORGANIZED HEALTH CARE EDUCATION/TRAINING PROGRAM

## 2023-03-21 PROCEDURE — 99213 OFFICE O/P EST LOW 20 MIN: CPT | Mod: AF,HB,S$PBB, | Performed by: STUDENT IN AN ORGANIZED HEALTH CARE EDUCATION/TRAINING PROGRAM

## 2023-03-21 PROCEDURE — 3008F BODY MASS INDEX DOCD: CPT | Mod: CPTII,,, | Performed by: STUDENT IN AN ORGANIZED HEALTH CARE EDUCATION/TRAINING PROGRAM

## 2023-03-21 PROCEDURE — 1160F PR REVIEW ALL MEDS BY PRESCRIBER/CLIN PHARMACIST DOCUMENTED: ICD-10-PCS | Mod: CPTII,,, | Performed by: STUDENT IN AN ORGANIZED HEALTH CARE EDUCATION/TRAINING PROGRAM

## 2023-03-21 PROCEDURE — 1159F MED LIST DOCD IN RCRD: CPT | Mod: CPTII,,, | Performed by: STUDENT IN AN ORGANIZED HEALTH CARE EDUCATION/TRAINING PROGRAM

## 2023-03-21 PROCEDURE — 3079F DIAST BP 80-89 MM HG: CPT | Mod: CPTII,,, | Performed by: STUDENT IN AN ORGANIZED HEALTH CARE EDUCATION/TRAINING PROGRAM

## 2023-03-21 PROCEDURE — 1159F PR MEDICATION LIST DOCUMENTED IN MEDICAL RECORD: ICD-10-PCS | Mod: CPTII,,, | Performed by: STUDENT IN AN ORGANIZED HEALTH CARE EDUCATION/TRAINING PROGRAM

## 2023-03-21 PROCEDURE — 99213 PR OFFICE/OUTPT VISIT, EST, LEVL III, 20-29 MIN: ICD-10-PCS | Mod: AF,HB,S$PBB, | Performed by: STUDENT IN AN ORGANIZED HEALTH CARE EDUCATION/TRAINING PROGRAM

## 2023-03-21 PROCEDURE — 3075F PR MOST RECENT SYSTOLIC BLOOD PRESS GE 130-139MM HG: ICD-10-PCS | Mod: CPTII,,, | Performed by: STUDENT IN AN ORGANIZED HEALTH CARE EDUCATION/TRAINING PROGRAM

## 2023-03-21 PROCEDURE — 3079F PR MOST RECENT DIASTOLIC BLOOD PRESSURE 80-89 MM HG: ICD-10-PCS | Mod: CPTII,,, | Performed by: STUDENT IN AN ORGANIZED HEALTH CARE EDUCATION/TRAINING PROGRAM

## 2023-03-21 PROCEDURE — 99213 OFFICE O/P EST LOW 20 MIN: CPT | Mod: PBBFAC,PN | Performed by: STUDENT IN AN ORGANIZED HEALTH CARE EDUCATION/TRAINING PROGRAM

## 2023-03-21 PROCEDURE — 3008F PR BODY MASS INDEX (BMI) DOCUMENTED: ICD-10-PCS | Mod: CPTII,,, | Performed by: STUDENT IN AN ORGANIZED HEALTH CARE EDUCATION/TRAINING PROGRAM

## 2023-03-21 PROCEDURE — 1160F RVW MEDS BY RX/DR IN RCRD: CPT | Mod: CPTII,,, | Performed by: STUDENT IN AN ORGANIZED HEALTH CARE EDUCATION/TRAINING PROGRAM

## 2023-03-21 RX ORDER — PRAZOSIN HYDROCHLORIDE 5 MG/1
5 CAPSULE ORAL NIGHTLY
Qty: 30 CAPSULE | Refills: 5 | Status: SHIPPED | OUTPATIENT
Start: 2023-03-21 | End: 2023-05-24

## 2023-03-21 NOTE — PROGRESS NOTES
Outpatient Psychiatry Follow-Up Visit    3/21/2023    Clinical Status of Patient:  Outpatient (Ambulatory)    Chief Complaint:  Salma Montero is a 27 y.o. female who presents today for follow-up of MDD, FLORENCE, PTSD, insomnia. Patient last seen for follow-up on 2/6/2023.  Met with patient.      Interval History and Content of Current Session:  Interim Events/Subjective Report/Content of Current Session:   Pt reports doing well since last visit.  Notes that she has started working with SW for trauma therapy and feels work is very productive.  Mood unchanged from last visit.  Anxiety elevated but unchanged.  Has been having dissociative episodes (tactile, olfactory symptoms).  Sleeping worse, taking prazosin 4mg nightly which she reports is somewhat helpful.  Energy low, motivation fair.  Denies SI/HI/AVH/paranoia, denies plan or desire for self harm or harm to others.  Denies change to chronic somatic complaints.  Pt happy with current regimen and wants to continue.     Review of Systems   PSYCHIATRIC: Pertinant items are noted in the narrative.  CONSTITUTIONAL: No weight gain or loss.  HEENT: +jaw clenching at night, +clicking of jaw with mouth opening/closing   MUSCULOSKELETAL: No pain or stiffness of the joints.  NEUROLOGIC: No weakness, sensory changes, seizures, confusion, memory loss, tremor or other abnormal movements.    RESPIRATORY: No shortness of breath.  CARDIOVASCULAR: No tachycardia or chest pain.    GASTROINTESTINAL: denies vomiting poor appetite, diarrhea.      Past Medical, Family and Social History: The patient's past medical, family and social history have been reviewed and updated as appropriate within the electronic medical record - see encounter notes.    Compliance: good    Side effects: denies    Risk Parameters:  Patient reports no suicidal ideation  Patient reports no homicidal ideation  Patient reports no self-injurious behavior  Patient reports no violent behavior    Exam (detailed: at  "least 9 elements; comprehensive: all 15 elements)   Constitutional  Vitals:  Most recent vital signs, dated less than 90 days prior to this appointment, were reviewed.     Vitals:    03/21/23 0827   BP: 136/83   Pulse: 80   Temp: 97.8 °F (36.6 °C)   SpO2: 100%   Weight: 108.8 kg (239 lb 14.4 oz)        General:   Constitutional: No acute distress, appears stated age, casually dressed    Neurologic:   Motor: moves all extremities spontaneously and without difficulty, no abnormal involuntary movements observed  Gait: normal gait and station    Mental status examination:   Appearance: appears stated age, casually dressed, no acute distress  Behavior: unremarkable for situation, calm and cooperative  Mood: "ok"  Affect: mood congruent, constricted, brighter  Thought process: linear and goal directed  Associations: appropriate for conversation  Thought content: no plan or desire for self harm or harm to others, denies paranoia, no delusional ideation volunteered  Perceptions: denies hallucinations or other altered perceptions  Orientation: oriented to day of week, month, year, location and situation  Language: English, fluid  Attention: able to attend to interview  Insight: good  Judgement: good    PHQ9 3/21/2023   Total Score 20     GAD7 3/21/2023 2/6/2023 12/22/2022   1. Feeling nervous, anxious, or on edge? 3 2 3   2. Not being able to stop or control worrying? 2 1 0   3. Worrying too much about different things? 3 3 3   4. Trouble relaxing? 2 0 1   5. Being so restless that it is hard to sit still? 2 0 0   6. Becoming easily annoyed or irritable? 2 0 2   7. Feeling afraid as if something awful might happen? 3 0 1   8. If you checked off any problems, how difficult have these problems made it for you to do your work, take care of things at home, or get along with other people? 2 1 2   FLORENCE-7 Score 17 6 10     Assessment and Diagnosis   Status/Progress: Based on the examination today, the patient's problem(s) is/are " mostly improved.  New problems have not been presented today though pt notes worsening of dissociative symptoms with trauma therapy.   Co-morbidities and Lack of compliance are not complicating management of the primary condition.       General Impression:    ICD-10-CM ICD-9-CM   1. Moderate episode of recurrent major depressive disorder  F33.1 296.32   2. FLORENCE (generalized anxiety disorder)  F41.1 300.02   3. PTSD (post-traumatic stress disorder)  F43.10 309.81     Intervention/Counseling/Treatment Plan   Continue trazodone 100-200mg nightly prn insomnia  Continue trileptal 300mg bid  Continue trintellix 20mg daily  Increase prazosin to 5mg nightly   Recommended pt continue to follow up with other providers ([facility] SW)  Provided psychoeducation on dissociation  CBC and CMP unremarkable, TSH wnl, UDS +cannabis and +benzo  No need for PEC as pt is not an imminent danger to self or others or gravely disabled due to acute psychiatric illness  Discussed that pt should either call clinic for psychiatric crisis symptoms or present to nearest emergency room    Discussed with patient informed consent including diagnosis, risks and benefits of proposed treatment above vs. alternative treatments vs. no treatment, as well as serious and common side effects of these treatments, and the inherent unpredictability of individual responses to these treatments. The patient expresses understanding of the above and displays the capacity to agree with this current plan. Patient also agrees that, currently, the benefits outweigh the risks and would like to pursue treatment at this time, and had no other questions.    Instructions:  Take all medications as prescribed.    Abstain from recreational drugs and alcohol.  Present to ED or call 911 for SI/HI plan or intent, psychosis, or medical emergency.    Return to Clinic: Follow up in about 8 weeks (around 5/16/2023).    Total time: Total time spent with patient 25 minutes.     Damian Ward,  MD [facility]

## 2023-04-03 ENCOUNTER — OFFICE VISIT (OUTPATIENT)
Dept: BEHAVIORAL HEALTH | Facility: CLINIC | Age: 28
End: 2023-04-03
Payer: MEDICAID

## 2023-04-03 DIAGNOSIS — F43.10 PTSD (POST-TRAUMATIC STRESS DISORDER): Primary | ICD-10-CM

## 2023-04-03 PROCEDURE — 90837 PSYTX W PT 60 MINUTES: CPT | Mod: AJ,HB,, | Performed by: COUNSELOR

## 2023-04-03 PROCEDURE — 90837 PR PSYCHOTHERAPY W/PATIENT, 60 MIN: ICD-10-PCS | Mod: AJ,HB,, | Performed by: COUNSELOR
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
## 2023-04-13 NOTE — PROGRESS NOTES
[facility] Neurology Follow Up Office Visit Note    Initial Visit Date: 3/8/2022  Last Visit Date: 12/13/2022  Current Visit Date:  04/14/2023    Chief Complaint:     Chief Complaint   Patient presents with    Patient presents today with a hx of migraines     Patient states she doesn't take any medication for her migraines besides OTC tylenol. Patient mentions her migraines get worse around her period. Patient complains of a full body tingling sensation when she trys to relax.       History of Present Illness:      This is 27 y.o. female with history of Anxiety, Depression, Hyperhidrosis, Eating disorder, substance abuse, Tachycardia who is referred for dizziness, tachycardia, and worsening headache disorder due to polypharmacy. Also has over sleeping issue, which leads to worsening daytime fatigue.  During last visit, baclofen 10 mg twice a day was started.  Metoprolol was increased to 100 mg once a night.    Interim History  Generalized tremulousness without LOC with stretching and lying down. Have just stopped taking birth control. Still having trouble with nightmare. Also planning on becoming pregnant at the end of this year.      Presenting History   Of note, patient was started on Glycopyrrolate in 11/2021 while on Hydroxyzine, Prazosin, Trazodone, Klonopin, Effexor. She noted that around 12/2021, she had one episode whereby she felt dizzy, palpitation, lightheaded, followed by LOC. She noted that since then, she would experience out of body sensation, dizziness, and palpitations during activities. She also noted worsening headaches for the past few months. She reports chronic trouble staying asleep and waking up tired. She would go to bed at around 9 - 10 pm and wake up at around 8 AM in the morning. She also notes hyperhydrosis that has been consistently getting worse in recent years, requiring her to start glyocopyrrolate.      Medications:     Current Outpatient Medications   Medication Instructions    baclofen  (LIORESAL) 10 mg, Oral, 2 times daily    cholecalciferol (vitamin D3) (VITAMIN D3) 50 mcg, Oral, Daily    famotidine-calcium carbonate-magnesium hydroxide (PEPCID COMPLETE) -165 mg 1 tablet, Oral, Daily PRN    glycopyrrolate (ROBINUL) 1 mg, Oral, Daily    inulin-chromium picolinate (FIBER SELECT GUMMIES) 2-100 gram-mcg Chew 2 tablets, Oral, Daily    L norgest/e.estradioL-e.estrad (SEASONIQUE) 0.15 mg-30 mcg (84)/10 mcg (7) 3MPk 1 tablet, Oral, Daily    loratadine (CLARITIN) 10 mg, Oral, Daily    metoprolol succinate (TOPROL-XL) 100 mg, Oral, Nightly    OXcarbazepine (TRILEPTAL) 300 mg, Oral, 2 times daily    pantoprazole (PROTONIX) 40 mg, Oral, Daily    prazosin (MINIPRESS) 5 mg, Oral, Nightly    promethazine (PHENERGAN) 12.5 mg, Oral, As needed (PRN)    traZODone (DESYREL) 100 MG tablet Take 100-200mg by mouth nightly as needed for insomnia.    vortioxetine (TRINTELLIX) 20 mg, Oral, Daily       Labs:     Results for orders placed or performed in visit on 03/01/23   Neisseria gonorrhoeae, DNA, SDA   Result Value Ref Range    Neisseria, Gonorrhoeae DNA, SDA Negative    Chlamydia trachomatis   Result Value Ref Range    Chlamydia trachomatis Negative    Trichomonas Vaginalis, MARTA   Result Value Ref Range    Trichomonas vaginalis Negative    HM PAP SMEAR   Result Value Ref Range    PAP Recommendation External No follow-up frequency specified     Pap Negative for intraephithelial lesion or malignancy Negative for intraephithelial lesion or malignancy, Other       Studies:      - routine EEG 1/19/2022: This is a normal routine awake EEG.  - Holter 24 hrs - 42% HR in 100s with premature beats.  - 10/20/2022 Tilt Table Test: negative.     Review of Systems:     All other systems reviewed and are negative.    Physical Exams:     Vitals:    04/14/23 0954   BP: 109/78   Pulse: 84   Temp: 98 °F (36.7 °C)         Vitals and nursing note reviewed.   Constitutional:       Appearance: Normal appearance.   HENT:      Head:  Normocephalic and atraumatic.      Nose: Nose normal.      Mouth/Throat:      Mouth: Mucous membranes are moist.      Pharynx: Oropharynx is clear.   Eyes:      Conjunctiva/sclera: Conjunctivae normal.   Cardiovascular:      Rate and Rhythm: Normal rate and regular rhythm.      Pulses: Normal pulses.   Pulmonary:      Effort: Pulmonary effort is normal.      Breath sounds: Normal breath sounds.   Abdominal:      General: Abdomen is flat.   Musculoskeletal:         General: Normal range of motion.      Cervical back: Normal range of motion.   Skin:     General: Skin is warm.   Neurological:      Mental Status: She is alert.         Comprehensive Neurological Exam:  Mental Status: Alert Oriented to Self, Date, and Place. Comprehension wnl. No dysarthria.   CN II - XII: EMMA, No APD, Fundus wnl OU, VA grossly intact to finger counting at 6 ft, VFFC, No ptosis OU, EOMI without nystagmus, LT/Temp symmetric in CN V1-3 distribution, Hearing grossly intact, Face Symmetric, Tongue and Uvula midline, Trapezius symmetric bilateral.   Motor: tone and bulk wnl throughout, no abnormal involuntary or voluntary movements, 5/5 to confrontation, Fine finger movements wnl b/l, No pronator drift.   Sensory: LT, Proprioception, Vibration, PP, Temp symmetric. No sensory simultagnosia.   Reflexes: 2+ throughout, plantar reflexes downward bilateral.   Cerebellar: FNF wnl b/l, RAHM wnl b/l  Romberg: Negative  Gait: normal.      Assessment:     This is 27 y.o. female with history of Anxiety, Depression, Hyperhidrosis, Eating disorder, Palpitations, and Tachycardia, Migraine headache, now with vasovagal syncope. Also planning on becoming pregnant in the near future.     Problem List Items Addressed This Visit          Neuro    Chronic migraine without aura without status migrainosus, not intractable - Primary (Chronic)    Chronic tension-type headache, not intractable       Psychiatric    PTSD (post-traumatic stress disorder) (Chronic)        ENT    Bruxism       Other    Hyperhidrosis    Orthostatic dizziness       Plan:     [] start Lamotrigine 25 mg at daily for 2 weeks, then 25 mg twice a day for 2 weeks, 50 mg twice a day for 2 weeks, then 100 mg twice a day thereafter.  [] continue with Trileptal 300 mg twice a day   [] decrease Metoprolol ER 50 mg once at night.   [] CBC, CMP in 2 months     RTC 4 months      I have explained the treatment plan, diagnosis, and prognosis to patient. All questions are answered to the best of my knowledge.     Face to face time 40 minutes, including documentation, chart review, counseling, education, review of test results, relevant medical records, and coordination of care.   I have explained the treatment plan, diagnosis, and prognosis to patient. All questions are answered to the best of my knowledge.       Daly Colon MD   General Neurology  04/14/2023

## 2023-04-14 ENCOUNTER — OFFICE VISIT (OUTPATIENT)
Dept: NEUROLOGY | Facility: CLINIC | Age: 28
End: 2023-04-14
Payer: MEDICAID

## 2023-04-14 VITALS
HEART RATE: 84 BPM | WEIGHT: 152.19 LBS | SYSTOLIC BLOOD PRESSURE: 109 MMHG | TEMPERATURE: 98 F | DIASTOLIC BLOOD PRESSURE: 78 MMHG | OXYGEN SATURATION: 96 % | HEIGHT: 66 IN | BODY MASS INDEX: 24.46 KG/M2

## 2023-04-14 DIAGNOSIS — G44.229 CHRONIC TENSION-TYPE HEADACHE, NOT INTRACTABLE: ICD-10-CM

## 2023-04-14 DIAGNOSIS — F45.8 BRUXISM: ICD-10-CM

## 2023-04-14 DIAGNOSIS — F43.10 PTSD (POST-TRAUMATIC STRESS DISORDER): ICD-10-CM

## 2023-04-14 DIAGNOSIS — R61 HYPERHIDROSIS: ICD-10-CM

## 2023-04-14 DIAGNOSIS — R55 VASOVAGAL SYMPTOM: ICD-10-CM

## 2023-04-14 DIAGNOSIS — G43.709 CHRONIC MIGRAINE WITHOUT AURA WITHOUT STATUS MIGRAINOSUS, NOT INTRACTABLE: Primary | ICD-10-CM

## 2023-04-14 PROCEDURE — 99215 OFFICE O/P EST HI 40 MIN: CPT | Mod: S$PBB,,, | Performed by: PSYCHIATRY & NEUROLOGY

## 2023-04-14 PROCEDURE — 3008F BODY MASS INDEX DOCD: CPT | Mod: CPTII,,, | Performed by: PSYCHIATRY & NEUROLOGY

## 2023-04-14 PROCEDURE — 99214 OFFICE O/P EST MOD 30 MIN: CPT | Mod: PBBFAC | Performed by: PSYCHIATRY & NEUROLOGY

## 2023-04-14 PROCEDURE — 3078F DIAST BP <80 MM HG: CPT | Mod: CPTII,,, | Performed by: PSYCHIATRY & NEUROLOGY

## 2023-04-14 PROCEDURE — 1159F MED LIST DOCD IN RCRD: CPT | Mod: CPTII,,, | Performed by: PSYCHIATRY & NEUROLOGY

## 2023-04-14 PROCEDURE — 3074F PR MOST RECENT SYSTOLIC BLOOD PRESSURE < 130 MM HG: ICD-10-PCS | Mod: CPTII,,, | Performed by: PSYCHIATRY & NEUROLOGY

## 2023-04-14 PROCEDURE — 3074F SYST BP LT 130 MM HG: CPT | Mod: CPTII,,, | Performed by: PSYCHIATRY & NEUROLOGY

## 2023-04-14 PROCEDURE — 99215 PR OFFICE/OUTPT VISIT, EST, LEVL V, 40-54 MIN: ICD-10-PCS | Mod: S$PBB,,, | Performed by: PSYCHIATRY & NEUROLOGY

## 2023-04-14 PROCEDURE — 3008F PR BODY MASS INDEX (BMI) DOCUMENTED: ICD-10-PCS | Mod: CPTII,,, | Performed by: PSYCHIATRY & NEUROLOGY

## 2023-04-14 PROCEDURE — 3078F PR MOST RECENT DIASTOLIC BLOOD PRESSURE < 80 MM HG: ICD-10-PCS | Mod: CPTII,,, | Performed by: PSYCHIATRY & NEUROLOGY

## 2023-04-14 PROCEDURE — 1159F PR MEDICATION LIST DOCUMENTED IN MEDICAL RECORD: ICD-10-PCS | Mod: CPTII,,, | Performed by: PSYCHIATRY & NEUROLOGY

## 2023-04-14 RX ORDER — METOPROLOL SUCCINATE 50 MG/1
50 TABLET, EXTENDED RELEASE ORAL NIGHTLY
Qty: 30 TABLET | Refills: 3 | Status: SHIPPED | OUTPATIENT
Start: 2023-04-14 | End: 2023-07-06 | Stop reason: SDUPTHER

## 2023-04-14 RX ORDER — LAMOTRIGINE 100 MG/1
100 TABLET ORAL 2 TIMES DAILY
Qty: 60 TABLET | Refills: 2 | Status: SHIPPED | OUTPATIENT
Start: 2023-05-26 | End: 2023-07-24

## 2023-04-14 RX ORDER — LAMOTRIGINE 25 MG/1
TABLET ORAL
Qty: 98 TABLET | Refills: 0 | Status: SHIPPED | OUTPATIENT
Start: 2023-04-14 | End: 2023-05-24 | Stop reason: DRUGHIGH

## 2023-05-24 ENCOUNTER — OFFICE VISIT (OUTPATIENT)
Dept: BEHAVIORAL HEALTH | Facility: CLINIC | Age: 28
End: 2023-05-24
Payer: MEDICAID

## 2023-05-24 VITALS
HEART RATE: 75 BPM | TEMPERATURE: 98 F | WEIGHT: 235 LBS | BODY MASS INDEX: 37.93 KG/M2 | DIASTOLIC BLOOD PRESSURE: 86 MMHG | SYSTOLIC BLOOD PRESSURE: 120 MMHG | OXYGEN SATURATION: 100 %

## 2023-05-24 DIAGNOSIS — F41.1 GAD (GENERALIZED ANXIETY DISORDER): Chronic | ICD-10-CM

## 2023-05-24 DIAGNOSIS — F43.10 PTSD (POST-TRAUMATIC STRESS DISORDER): Chronic | ICD-10-CM

## 2023-05-24 DIAGNOSIS — F33.1 MODERATE EPISODE OF RECURRENT MAJOR DEPRESSIVE DISORDER: Primary | Chronic | ICD-10-CM

## 2023-05-24 DIAGNOSIS — G47.00 INSOMNIA, UNSPECIFIED TYPE: ICD-10-CM

## 2023-05-24 PROCEDURE — 99213 PR OFFICE/OUTPT VISIT, EST, LEVL III, 20-29 MIN: ICD-10-PCS | Mod: AF,HB,S$PBB, | Performed by: STUDENT IN AN ORGANIZED HEALTH CARE EDUCATION/TRAINING PROGRAM

## 2023-05-24 PROCEDURE — 3008F PR BODY MASS INDEX (BMI) DOCUMENTED: ICD-10-PCS | Mod: CPTII,,, | Performed by: STUDENT IN AN ORGANIZED HEALTH CARE EDUCATION/TRAINING PROGRAM

## 2023-05-24 PROCEDURE — 99213 OFFICE O/P EST LOW 20 MIN: CPT | Mod: PBBFAC,PN | Performed by: STUDENT IN AN ORGANIZED HEALTH CARE EDUCATION/TRAINING PROGRAM

## 2023-05-24 PROCEDURE — 99213 OFFICE O/P EST LOW 20 MIN: CPT | Mod: AF,HB,S$PBB, | Performed by: STUDENT IN AN ORGANIZED HEALTH CARE EDUCATION/TRAINING PROGRAM

## 2023-05-24 PROCEDURE — 90833 PSYTX W PT W E/M 30 MIN: CPT | Mod: AF,HB,S$PBB, | Performed by: STUDENT IN AN ORGANIZED HEALTH CARE EDUCATION/TRAINING PROGRAM

## 2023-05-24 PROCEDURE — 3074F SYST BP LT 130 MM HG: CPT | Mod: CPTII,,, | Performed by: STUDENT IN AN ORGANIZED HEALTH CARE EDUCATION/TRAINING PROGRAM

## 2023-05-24 PROCEDURE — 1160F RVW MEDS BY RX/DR IN RCRD: CPT | Mod: CPTII,,, | Performed by: STUDENT IN AN ORGANIZED HEALTH CARE EDUCATION/TRAINING PROGRAM

## 2023-05-24 PROCEDURE — 1159F MED LIST DOCD IN RCRD: CPT | Mod: CPTII,,, | Performed by: STUDENT IN AN ORGANIZED HEALTH CARE EDUCATION/TRAINING PROGRAM

## 2023-05-24 PROCEDURE — 1160F PR REVIEW ALL MEDS BY PRESCRIBER/CLIN PHARMACIST DOCUMENTED: ICD-10-PCS | Mod: CPTII,,, | Performed by: STUDENT IN AN ORGANIZED HEALTH CARE EDUCATION/TRAINING PROGRAM

## 2023-05-24 PROCEDURE — 3079F PR MOST RECENT DIASTOLIC BLOOD PRESSURE 80-89 MM HG: ICD-10-PCS | Mod: CPTII,,, | Performed by: STUDENT IN AN ORGANIZED HEALTH CARE EDUCATION/TRAINING PROGRAM

## 2023-05-24 PROCEDURE — 90833 PR PSYCHOTHERAPY W/PATIENT W/E&M, 30 MIN (ADD ON): ICD-10-PCS | Mod: AF,HB,S$PBB, | Performed by: STUDENT IN AN ORGANIZED HEALTH CARE EDUCATION/TRAINING PROGRAM

## 2023-05-24 PROCEDURE — 3008F BODY MASS INDEX DOCD: CPT | Mod: CPTII,,, | Performed by: STUDENT IN AN ORGANIZED HEALTH CARE EDUCATION/TRAINING PROGRAM

## 2023-05-24 PROCEDURE — 3079F DIAST BP 80-89 MM HG: CPT | Mod: CPTII,,, | Performed by: STUDENT IN AN ORGANIZED HEALTH CARE EDUCATION/TRAINING PROGRAM

## 2023-05-24 PROCEDURE — 3074F PR MOST RECENT SYSTOLIC BLOOD PRESSURE < 130 MM HG: ICD-10-PCS | Mod: CPTII,,, | Performed by: STUDENT IN AN ORGANIZED HEALTH CARE EDUCATION/TRAINING PROGRAM

## 2023-05-24 PROCEDURE — 1159F PR MEDICATION LIST DOCUMENTED IN MEDICAL RECORD: ICD-10-PCS | Mod: CPTII,,, | Performed by: STUDENT IN AN ORGANIZED HEALTH CARE EDUCATION/TRAINING PROGRAM

## 2023-05-24 RX ORDER — PRAZOSIN HYDROCHLORIDE 2 MG/1
2 CAPSULE ORAL 2 TIMES DAILY
COMMUNITY
Start: 2023-05-02 | End: 2023-07-06
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
## 2023-06-06 PROBLEM — E78.00 ELEVATED LOW DENSITY LIPOPROTEIN (LDL) CHOLESTEROL LEVEL: Status: RESOLVED | Noted: 2022-06-16 | Resolved: 2023-06-06

## 2023-07-06 ENCOUNTER — OFFICE VISIT (OUTPATIENT)
Dept: BEHAVIORAL HEALTH | Facility: CLINIC | Age: 28
End: 2023-07-06
Payer: MEDICAID

## 2023-07-06 VITALS
SYSTOLIC BLOOD PRESSURE: 136 MMHG | DIASTOLIC BLOOD PRESSURE: 82 MMHG | BODY MASS INDEX: 37.4 KG/M2 | WEIGHT: 231.69 LBS | OXYGEN SATURATION: 99 % | HEART RATE: 71 BPM | TEMPERATURE: 98 F

## 2023-07-06 DIAGNOSIS — F33.1 MODERATE EPISODE OF RECURRENT MAJOR DEPRESSIVE DISORDER: Primary | Chronic | ICD-10-CM

## 2023-07-06 DIAGNOSIS — F43.10 PTSD (POST-TRAUMATIC STRESS DISORDER): Chronic | ICD-10-CM

## 2023-07-06 DIAGNOSIS — G47.00 INSOMNIA, UNSPECIFIED TYPE: ICD-10-CM

## 2023-07-06 DIAGNOSIS — F41.1 GAD (GENERALIZED ANXIETY DISORDER): Chronic | ICD-10-CM

## 2023-07-06 PROCEDURE — 3075F SYST BP GE 130 - 139MM HG: CPT | Mod: CPTII,,, | Performed by: STUDENT IN AN ORGANIZED HEALTH CARE EDUCATION/TRAINING PROGRAM

## 2023-07-06 PROCEDURE — 99213 OFFICE O/P EST LOW 20 MIN: CPT | Mod: AF,HB,S$PBB, | Performed by: STUDENT IN AN ORGANIZED HEALTH CARE EDUCATION/TRAINING PROGRAM

## 2023-07-06 PROCEDURE — 3008F BODY MASS INDEX DOCD: CPT | Mod: CPTII,,, | Performed by: STUDENT IN AN ORGANIZED HEALTH CARE EDUCATION/TRAINING PROGRAM

## 2023-07-06 PROCEDURE — 3079F DIAST BP 80-89 MM HG: CPT | Mod: CPTII,,, | Performed by: STUDENT IN AN ORGANIZED HEALTH CARE EDUCATION/TRAINING PROGRAM

## 2023-07-06 PROCEDURE — 99213 PR OFFICE/OUTPT VISIT, EST, LEVL III, 20-29 MIN: ICD-10-PCS | Mod: AF,HB,S$PBB, | Performed by: STUDENT IN AN ORGANIZED HEALTH CARE EDUCATION/TRAINING PROGRAM

## 2023-07-06 PROCEDURE — 3008F PR BODY MASS INDEX (BMI) DOCUMENTED: ICD-10-PCS | Mod: CPTII,,, | Performed by: STUDENT IN AN ORGANIZED HEALTH CARE EDUCATION/TRAINING PROGRAM

## 2023-07-06 PROCEDURE — 1160F PR REVIEW ALL MEDS BY PRESCRIBER/CLIN PHARMACIST DOCUMENTED: ICD-10-PCS | Mod: CPTII,,, | Performed by: STUDENT IN AN ORGANIZED HEALTH CARE EDUCATION/TRAINING PROGRAM

## 2023-07-06 PROCEDURE — 1159F PR MEDICATION LIST DOCUMENTED IN MEDICAL RECORD: ICD-10-PCS | Mod: CPTII,,, | Performed by: STUDENT IN AN ORGANIZED HEALTH CARE EDUCATION/TRAINING PROGRAM

## 2023-07-06 PROCEDURE — 1160F RVW MEDS BY RX/DR IN RCRD: CPT | Mod: CPTII,,, | Performed by: STUDENT IN AN ORGANIZED HEALTH CARE EDUCATION/TRAINING PROGRAM

## 2023-07-06 PROCEDURE — 99213 OFFICE O/P EST LOW 20 MIN: CPT | Mod: PBBFAC,PN | Performed by: STUDENT IN AN ORGANIZED HEALTH CARE EDUCATION/TRAINING PROGRAM

## 2023-07-06 PROCEDURE — 3075F PR MOST RECENT SYSTOLIC BLOOD PRESS GE 130-139MM HG: ICD-10-PCS | Mod: CPTII,,, | Performed by: STUDENT IN AN ORGANIZED HEALTH CARE EDUCATION/TRAINING PROGRAM

## 2023-07-06 PROCEDURE — 1159F MED LIST DOCD IN RCRD: CPT | Mod: CPTII,,, | Performed by: STUDENT IN AN ORGANIZED HEALTH CARE EDUCATION/TRAINING PROGRAM

## 2023-07-06 PROCEDURE — 3079F PR MOST RECENT DIASTOLIC BLOOD PRESSURE 80-89 MM HG: ICD-10-PCS | Mod: CPTII,,, | Performed by: STUDENT IN AN ORGANIZED HEALTH CARE EDUCATION/TRAINING PROGRAM

## 2023-07-06 RX ORDER — TRAZODONE HYDROCHLORIDE 100 MG/1
TABLET ORAL
Qty: 60 TABLET | Refills: 5 | Status: SHIPPED | OUTPATIENT
Start: 2023-07-06 | End: 2023-09-06 | Stop reason: ALTCHOICE

## 2023-07-06 RX ORDER — METOPROLOL SUCCINATE 50 MG/1
50 TABLET, EXTENDED RELEASE ORAL NIGHTLY
Qty: 30 TABLET | Refills: 3 | Status: SHIPPED | OUTPATIENT
Start: 2023-07-06 | End: 2023-07-20 | Stop reason: SDUPTHER

## 2023-07-06 RX ORDER — OXCARBAZEPINE 300 MG/1
300 TABLET, FILM COATED ORAL 2 TIMES DAILY
Qty: 60 TABLET | Refills: 5 | Status: SHIPPED | OUTPATIENT
Start: 2023-07-06 | End: 2023-08-16 | Stop reason: SDUPTHER

## 2023-07-06 NOTE — PROGRESS NOTES
"Outpatient Psychiatry Follow-Up Visit    7/6/2023    Clinical Status of Patient:  Outpatient (Ambulatory)    Chief Complaint:  Salma Montero is a 28 y.o. female who presents today for follow-up of MDD, FLORENCE, PTSD, insomnia. Patient last seen for follow-up on 3/21/2023.  Met with patient.      Interval History and Content of Current Session:  Interim Events/Subjective Report/Content of Current Session:   Pt reports doing "better"  overall.  Patient says she is done a lot of thinking about her relationships, and she wants to distance herself from her sisters.  Notes that relationship with sisters was a source of stress for her in the past.  Has been taking steps to act on this plan, feels that her mood and level of anxiety have both improved.  Sleeping well, but has continued vivid dreams.  Better energy and motivation.  Denies SI/HI/AVH/paranoia, denies plan or desire for self harm or harm to others.  Denies side effects from medication regimen.  Denied somatic complaints.  Patient self discontinued prazosin after last visit (did not inform provider).  Patient happy with current regimen and wants to continue.    Review of Systems   PSYCHIATRIC: Pertinant items are noted in the narrative.  CONSTITUTIONAL: No weight gain or loss.  MUSCULOSKELETAL: No pain or stiffness of the joints.  NEUROLOGIC: No weakness, sensory changes, seizures, confusion, memory loss, tremor or other abnormal movements.    RESPIRATORY: No shortness of breath.  CARDIOVASCULAR: No tachycardia or chest pain.    GASTROINTESTINAL: denies vomiting poor appetite, diarrhea.      Past Medical, Family and Social History: The patient's past medical, family and social history have been reviewed and updated as appropriate within the electronic medical record - see encounter notes.    Compliance: good    Side effects: denies    Risk Parameters:  Patient reports no suicidal ideation  Patient reports no homicidal ideation  Patient reports no self-injurious " "behavior  Patient reports no violent behavior    Exam (detailed: at least 9 elements; comprehensive: all 15 elements)   Constitutional  Vitals:  Most recent vital signs, dated less than 90 days prior to this appointment, were reviewed.     Vitals:    07/06/23 1127   BP: 136/82   Pulse: 71   Temp: 98.1 °F (36.7 °C)   SpO2: 99%   Weight: 105.1 kg (231 lb 11.2 oz)        General:   Constitutional: No acute distress, appears stated age, casually dressed    Neurologic:   Motor: moves all extremities spontaneously and without difficulty, no abnormal involuntary movements observed  Gait: normal gait and station    Mental status examination:   Appearance: appears stated age, casually dressed, no acute distress  Behavior: unremarkable for situation, calm and cooperative  Mood: "better"  Affect: mood congruent, constricted, brighter  Thought process: linear and goal directed  Associations: appropriate for conversation  Thought content: no plan or desire for self harm or harm to others, denies paranoia, no delusional ideation volunteered  Perceptions: denies hallucinations or other altered perceptions  Orientation: oriented to day of week, month, year, location and situation  Language: English, fluid  Attention: able to attend to interview  Insight: good  Judgement: good    PHQ9 7/6/2023   Total Score 12     GAD7 7/6/2023 5/24/2023 3/21/2023   1. Feeling nervous, anxious, or on edge? 1 3 3   2. Not being able to stop or control worrying? 3 3 2   3. Worrying too much about different things? 3 3 3   4. Trouble relaxing? 1 3 2   5. Being so restless that it is hard to sit still? 0 3 2   6. Becoming easily annoyed or irritable? 3 3 2   7. Feeling afraid as if something awful might happen? 3 3 3   8. If you checked off any problems, how difficult have these problems made it for you to do your work, take care of things at home, or get along with other people? 1 3 2   FLORENCE-7 Score 14 21 17     Assessment and Diagnosis "   Status/Progress: Based on the examination today, the patient's problem(s) is/are improved.  New problems have not been presented today.   Co-morbidities and Lack of compliance are not complicating management of the primary condition.  Number of separate conditions addressed during today's visit: 3 (mood stable, anxiety stable, insomnia stable).  Are medication adjustments being made today: No.  Are referral(s) being ordered today: No.  Complexity (level) of medical decision making employed in the encounter: MODERATE.    General Impression:    ICD-10-CM ICD-9-CM   1. Moderate episode of recurrent major depressive disorder  F33.1 296.32   2. FLORENCE (generalized anxiety disorder)  F41.1 300.02   3. PTSD (post-traumatic stress disorder)  F43.10 309.81   4. Insomnia, unspecified type  G47.00 780.52   5. Chronic migraine without aura without status migrainosus, not intractable  G43.709 346.70   6. Vasovagal symptom  R55 780.2     Intervention/Counseling/Treatment Plan   Continue trazodone 100-200mg nightly prn insomnia  Continue trileptal 300mg bid  Continue lamotrigine 100mg bid, defer management to neurology  Continue trintellix 20mg daily  Discontinue prazosin due to SE (abnormal sleep behaviors)  CBC and CMP unremarkable, TSH wnl, UDS +cannabis and +benzo  No need for PEC as pt is not an imminent danger to self or others or gravely disabled due to acute psychiatric illness  Discussed that pt should either call clinic for psychiatric crisis symptoms or present to nearest emergency room    Discussed with patient informed consent including diagnosis, risks and benefits of proposed treatment above vs. alternative treatments vs. no treatment, as well as serious and common side effects of these treatments, and the inherent unpredictability of individual responses to these treatments. The patient expresses understanding of the above and displays the capacity to agree with this current plan. Patient also agrees that,  currently, the benefits outweigh the risks and would like to pursue treatment at this time, and had no other questions.    Instructions:  Take all medications as prescribed.    Abstain from recreational drugs and alcohol.  Present to ED or call 911 for SI/HI plan or intent, psychosis, or medical emergency.    Return to Clinic: Follow up in about 2 months (around 9/6/2023).    Total time: Total time spent with patient 25 minutes.     Damian Ward MD  [facility]

## 2023-07-20 ENCOUNTER — OFFICE VISIT (OUTPATIENT)
Dept: CARDIOLOGY | Facility: CLINIC | Age: 28
End: 2023-07-20
Payer: MEDICAID

## 2023-07-20 VITALS
HEART RATE: 98 BPM | RESPIRATION RATE: 20 BRPM | WEIGHT: 224.38 LBS | TEMPERATURE: 98 F | BODY MASS INDEX: 36.06 KG/M2 | SYSTOLIC BLOOD PRESSURE: 101 MMHG | HEIGHT: 66 IN | DIASTOLIC BLOOD PRESSURE: 70 MMHG

## 2023-07-20 DIAGNOSIS — R00.0 TACHYCARDIA: Primary | ICD-10-CM

## 2023-07-20 DIAGNOSIS — F41.1 GAD (GENERALIZED ANXIETY DISORDER): Chronic | ICD-10-CM

## 2023-07-20 DIAGNOSIS — G43.709 CHRONIC MIGRAINE WITHOUT AURA WITHOUT STATUS MIGRAINOSUS, NOT INTRACTABLE: Primary | ICD-10-CM

## 2023-07-20 DIAGNOSIS — G44.229 CHRONIC TENSION-TYPE HEADACHE, NOT INTRACTABLE: ICD-10-CM

## 2023-07-20 PROCEDURE — 99214 OFFICE O/P EST MOD 30 MIN: CPT | Mod: PBBFAC | Performed by: INTERNAL MEDICINE

## 2023-07-20 PROCEDURE — 93005 ELECTROCARDIOGRAM TRACING: CPT

## 2023-07-20 RX ORDER — METOPROLOL SUCCINATE 50 MG/1
50 TABLET, EXTENDED RELEASE ORAL NIGHTLY
Qty: 30 TABLET | Refills: 6 | Status: SHIPPED | OUTPATIENT
Start: 2023-07-20 | End: 2024-01-22 | Stop reason: SDUPTHER

## 2023-07-20 NOTE — PROGRESS NOTES
CHIEF COMPLAINT:   Chief Complaint   Patient presents with    f/u denies chest pain or sob since LV questions on vagel                    Review of patient's allergies indicates:   Allergen Reactions    Corn meal-luffa cylindrica frt Anaphylaxis, Hives, Itching and Rash    Orange Hives and Itching    Sulfa (sulfonamide antibiotics) Rash, Hives and Itching                                          HPI:  Salma Montero 28 y.o. female following up for syncopal episodes and tachycardia  Pt denies chest pain, dyspnea, shortness of breath.                                                                                                                                                                                       Patient has not experience syncopal episodes since last September. Patient reports when she stretches she still gets dizzy, sees stars, and sometimes would shake. Her neurologist seems to think its vasovagal.    Patient reports significant reduction in palpitations. She reports its too infrequent to quantify. Patient is happy with treatment.   She has had fairly extensive cardiac work up. Stress test, echo, 30 day holter were all unremarkable.     Patient has been referred to [facility] and has not found time follow up with them yet.                                                                                                                                                                                                                                                                                                   CARDIAC TESTING:  No results found for this or any previous visit.    Results for orders placed during the hospital encounter of 06/29/22    Exercise Stress - EKG    Interpretation Summary    The EKG portion of this study is negative for ischemia.    The patient reported no chest pain during the stress test.    The blood pressure response to stress was normal.    There were no arrhythmias  during stress.    Minutes exercised:  5  - ST deviation:  0 mm  Angina:  no angina (0)  - Angina index:  0  Duke treadmill score:  5    Patient has Duke Treadmill Score = 5.  This indicates low risk for cardiovascular events (predicted 4 year survival is 99%     No results found for this or any previous visit.       Patient Active Problem List   Diagnosis    Hyperhidrosis    Insomnia    Migraine headache    Tinnitus    Obesity    Moderate episode of recurrent major depressive disorder    FLORENCE (generalized anxiety disorder)    Vitamin D deficiency    Allergic rhinitis    Gastroesophageal reflux disease    Heart palpitations    Syncope    PTSD (post-traumatic stress disorder)    Chronic migraine without aura without status migrainosus, not intractable    Orthostatic dizziness    SOB (shortness of breath)    Nausea and vomiting    Chronic tension-type headache, not intractable    Bruxism    Tachycardia    Left otitis media with effusion     Past Surgical History:   Procedure Laterality Date    TONSILLECTOMY AND ADENOIDECTOMY      UPPER GASTROINTESTINAL ENDOSCOPY       Social History     Socioeconomic History    Marital status:    Tobacco Use    Smoking status: Never    Smokeless tobacco: Never   Substance and Sexual Activity    Alcohol use: Yes     Alcohol/week: 2.0 standard drinks     Types: 2 Shots of liquor per week     Comment: couple times a month    Drug use: Yes     Frequency: 21.0 times per week     Types: Marijuana    Sexual activity: Yes     Partners: Male     Birth control/protection: OCP        Family History   Problem Relation Age of Onset    Heart failure Father     Bipolar disorder Sister     Crohn's disease Sister     Cancer Maternal Grandmother     Anxiety disorder Maternal Grandmother     Alzheimer's disease Maternal Grandmother     Heart failure Maternal Grandfather     Diabetes Maternal Grandfather     Skin cancer Maternal Grandfather     Bipolar disorder Cousin     Bipolar disorder Cousin   "        Current Outpatient Medications:     cholecalciferol, vitamin D3, (VITAMIN D3) 50 mcg (2,000 unit) Cap capsule, Take 50 mcg by mouth once daily., Disp: , Rfl:     famotidine-calcium carbonate-magnesium hydroxide (PEPCID COMPLETE) -165 mg, Take 1 tablet by mouth daily as needed., Disp: , Rfl:     glycopyrrolate (ROBINUL) 1 mg Tab, Take 1 mg by mouth once daily., Disp: , Rfl:     inulin-chromium picolinate (FIBER SELECT GUMMIES) 2-100 gram-mcg Chew, Take 2 tablets by mouth once daily., Disp: , Rfl:     lamoTRIgine (LAMICTAL) 100 MG tablet, Take 1 tablet (100 mg total) by mouth 2 (two) times daily., Disp: 60 tablet, Rfl: 2    loratadine (CLARITIN) 10 mg tablet, Take 10 mg by mouth once daily., Disp: , Rfl:     metoprolol succinate (TOPROL-XL) 50 MG 24 hr tablet, Take 1 tablet (50 mg total) by mouth every evening., Disp: 30 tablet, Rfl: 3    OXcarbazepine (TRILEPTAL) 300 MG Tab, Take 1 tablet (300 mg total) by mouth 2 (two) times daily., Disp: 60 tablet, Rfl: 5    pantoprazole (PROTONIX) 40 MG tablet, Take 1 tablet (40 mg total) by mouth once daily., Disp: 90 tablet, Rfl: 3    promethazine (PHENERGAN) 12.5 MG Tab, Take 12.5 mg by mouth as needed., Disp: , Rfl:     traZODone (DESYREL) 100 MG tablet, Take 100-200mg by mouth nightly as needed for insomnia., Disp: 60 tablet, Rfl: 5    vortioxetine (TRINTELLIX) 20 mg Tab, Take 1 tablet (20 mg total) by mouth Daily., Disp: 30 tablet, Rfl: 5     ROS:                                                                                                                                                                             Negative for all symptoms other than those listed in HPI     Blood pressure 101/70, pulse 98, temperature 98.4 °F (36.9 °C), temperature source Oral, resp. rate 20, height 5' 6" (1.676 m), weight 101.8 kg (224 lb 6.4 oz).   PE:  Physical Exam  Constitutional:       Appearance: Normal appearance.   Cardiovascular:      Rate and Rhythm: Regular " rhythm.      Heart sounds: No murmur heard.    No friction rub. No gallop.   Pulmonary:      Effort: No respiratory distress.      Breath sounds: No wheezing, rhonchi or rales.   Abdominal:      General: Abdomen is flat.      Palpations: Abdomen is soft.      Tenderness: There is no abdominal tenderness.   Musculoskeletal:         General: Normal range of motion.   Skin:     General: Skin is warm and dry.   Neurological:      General: No focal deficit present.   Psychiatric:         Mood and Affect: Mood normal.              ASSESSMENT/PLAN:   MET 4.0  RCRI: 0 The patient's 30 day risk of death, MI, or cardiac arrest for potential surgery is 3.9%    Syncope & Collapse   - most recent 30 day event monitor was unremarkable  - symptoms are concerning for carotid hypersensitivity  - Refered to Dr. Ismael Arredondo at [facility] for further evaluation and autonomic testing   - continue metoprolol   - counseled on hydration and compression socks      Palpitatons  - most recent 30-day event monitor was unremarkable  -vast reduction in symptoms since starting medication  - continue metoprolol     Follow up in 6 months    Seymour Gibson M.D  [facility] Internal Medicine PGY-1

## 2023-07-20 NOTE — PROGRESS NOTES
Cardiology Attending    I evaluated Salma Montero and discussed the patient's symptoms, findings, and management plan with the resident.  Please see the Cardiology note for details.

## 2023-07-23 DIAGNOSIS — R55 VASOVAGAL SYMPTOM: ICD-10-CM

## 2023-07-23 DIAGNOSIS — G43.709 CHRONIC MIGRAINE WITHOUT AURA WITHOUT STATUS MIGRAINOSUS, NOT INTRACTABLE: ICD-10-CM

## 2023-07-24 RX ORDER — LAMOTRIGINE 100 MG/1
TABLET ORAL
Qty: 60 TABLET | Refills: 2 | Status: SHIPPED | OUTPATIENT
Start: 2023-07-24 | End: 2023-08-16 | Stop reason: SDUPTHER

## 2023-08-06 DIAGNOSIS — K21.01 GASTROESOPHAGEAL REFLUX DISEASE WITH ESOPHAGITIS AND HEMORRHAGE: ICD-10-CM

## 2023-08-08 RX ORDER — PANTOPRAZOLE SODIUM 40 MG/1
40 TABLET, DELAYED RELEASE ORAL
Qty: 90 TABLET | Refills: 0 | Status: SHIPPED | OUTPATIENT
Start: 2023-08-08 | End: 2023-08-14 | Stop reason: SDUPTHER

## 2023-08-09 ENCOUNTER — HOSPITAL ENCOUNTER (EMERGENCY)
Facility: HOSPITAL | Age: 28
Discharge: HOME | End: 2023-08-09
Attending: EMERGENCY MEDICINE
Payer: MEDICAID

## 2023-08-09 VITALS
HEART RATE: 83 BPM | BODY MASS INDEX: 35.04 KG/M2 | WEIGHT: 218.06 LBS | OXYGEN SATURATION: 96 % | TEMPERATURE: 98 F | SYSTOLIC BLOOD PRESSURE: 127 MMHG | RESPIRATION RATE: 17 BRPM | HEIGHT: 66 IN | DIASTOLIC BLOOD PRESSURE: 64 MMHG

## 2023-08-09 DIAGNOSIS — R00.0 TACHYCARDIA: ICD-10-CM

## 2023-08-09 DIAGNOSIS — N30.01 ACUTE CYSTITIS WITH HEMATURIA: Primary | ICD-10-CM

## 2023-08-09 LAB
ABS NEUT CALC (OHS): 20.93 X10(3)/MCL (ref 2.1–9.2)
ANION GAP SERPL CALC-SCNC: 13 MEQ/L
APPEARANCE UR: ABNORMAL
B-HCG UR QL: NEGATIVE
BACTERIA #/AREA URNS AUTO: ABNORMAL /HPF
BILIRUB UR QL STRIP.AUTO: NEGATIVE
BNP BLD-MCNC: 14 PG/ML
BUN SERPL-MCNC: 12.7 MG/DL (ref 7–18.7)
CALCIUM SERPL-MCNC: 9.6 MG/DL (ref 8.4–10.2)
CHLORIDE SERPL-SCNC: 103 MMOL/L (ref 98–107)
CO2 SERPL-SCNC: 20 MMOL/L (ref 22–29)
COLOR UR: YELLOW
CREAT SERPL-MCNC: 1.16 MG/DL (ref 0.55–1.02)
CREAT/UREA NIT SERPL: 11
CTP QC/QA: YES
ERYTHROCYTE [DISTWIDTH] IN BLOOD BY AUTOMATED COUNT: 12.2 % (ref 11.5–17)
FLUAV AG UPPER RESP QL IA.RAPID: NOT DETECTED
FLUBV AG UPPER RESP QL IA.RAPID: NOT DETECTED
GFR SERPLBLD CREATININE-BSD FMLA CKD-EPI: >60 MLS/MIN/1.73/M2
GLUCOSE SERPL-MCNC: 156 MG/DL (ref 74–100)
GLUCOSE UR QL STRIP.AUTO: ABNORMAL
HCT VFR BLD AUTO: 43.2 % (ref 37–47)
HGB BLD-MCNC: 14.8 G/DL (ref 12–16)
HYALINE CASTS #/AREA URNS LPF: ABNORMAL /LPF
KETONES UR QL STRIP.AUTO: NEGATIVE
LEUKOCYTE ESTERASE UR QL STRIP.AUTO: 500
LYMPHOCYTES NFR BLD MANUAL: 0.69 X10(3)/MCL
LYMPHOCYTES NFR BLD MANUAL: 3 % (ref 13–40)
MAGNESIUM SERPL-MCNC: 1.8 MG/DL (ref 1.6–2.6)
MCH RBC QN AUTO: 28.2 PG (ref 27–31)
MCHC RBC AUTO-ENTMCNC: 34.3 G/DL (ref 33–36)
MCV RBC AUTO: 82.3 FL (ref 80–94)
MONOCYTES NFR BLD MANUAL: 1.38 X10(3)/MCL (ref 0.1–1.3)
MONOCYTES NFR BLD MANUAL: 6 % (ref 2–11)
MUCOUS THREADS URNS QL MICRO: ABNORMAL /LPF
NEUTROPHILS NFR BLD MANUAL: 91 % (ref 47–80)
NITRITE UR QL STRIP.AUTO: NEGATIVE
NON-SQ EPI CELLS URNS QL MICRO: ABNORMAL /HPF
NRBC BLD AUTO-RTO: 0 %
PH UR STRIP.AUTO: 6 [PH]
PLATELET # BLD AUTO: 323 X10(3)/MCL (ref 130–400)
PLATELET # BLD EST: ADEQUATE 10*3/UL
PMV BLD AUTO: 9 FL (ref 7.4–10.4)
POTASSIUM SERPL-SCNC: 3.5 MMOL/L (ref 3.5–5.1)
PROT UR QL STRIP.AUTO: ABNORMAL
RBC # BLD AUTO: 5.25 X10(6)/MCL (ref 4.2–5.4)
RBC #/AREA URNS AUTO: ABNORMAL /HPF
RBC MORPH BLD: NORMAL
RBC UR QL AUTO: ABNORMAL
SARS-COV-2 RNA RESP QL NAA+PROBE: NOT DETECTED
SODIUM SERPL-SCNC: 136 MMOL/L (ref 136–145)
SP GR UR STRIP.AUTO: 1.03
SQUAMOUS #/AREA URNS LPF: ABNORMAL /HPF
UROBILINOGEN UR STRIP-ACNC: ABNORMAL
WBC # SPEC AUTO: 23 X10(3)/MCL (ref 4.5–11.5)
WBC #/AREA URNS AUTO: ABNORMAL /HPF

## 2023-08-09 PROCEDURE — 96374 THER/PROPH/DIAG INJ IV PUSH: CPT

## 2023-08-09 PROCEDURE — 87088 URINE BACTERIA CULTURE: CPT | Performed by: EMERGENCY MEDICINE

## 2023-08-09 PROCEDURE — 96361 HYDRATE IV INFUSION ADD-ON: CPT

## 2023-08-09 PROCEDURE — 85027 COMPLETE CBC AUTOMATED: CPT | Performed by: EMERGENCY MEDICINE

## 2023-08-09 PROCEDURE — 83880 ASSAY OF NATRIURETIC PEPTIDE: CPT | Performed by: EMERGENCY MEDICINE

## 2023-08-09 PROCEDURE — 83735 ASSAY OF MAGNESIUM: CPT | Performed by: EMERGENCY MEDICINE

## 2023-08-09 PROCEDURE — 81001 URINALYSIS AUTO W/SCOPE: CPT | Performed by: EMERGENCY MEDICINE

## 2023-08-09 PROCEDURE — 81025 URINE PREGNANCY TEST: CPT | Performed by: EMERGENCY MEDICINE

## 2023-08-09 PROCEDURE — 99284 EMERGENCY DEPT VISIT MOD MDM: CPT | Mod: 25

## 2023-08-09 PROCEDURE — 87077 CULTURE AEROBIC IDENTIFY: CPT | Performed by: EMERGENCY MEDICINE

## 2023-08-09 PROCEDURE — 63600175 PHARM REV CODE 636 W HCPCS: Performed by: EMERGENCY MEDICINE

## 2023-08-09 PROCEDURE — 80048 BASIC METABOLIC PNL TOTAL CA: CPT | Performed by: EMERGENCY MEDICINE

## 2023-08-09 PROCEDURE — 0240U COVID/FLU A&B PCR: CPT | Performed by: EMERGENCY MEDICINE

## 2023-08-09 PROCEDURE — 93005 ELECTROCARDIOGRAM TRACING: CPT

## 2023-08-09 RX ORDER — LORAZEPAM 2 MG/ML
1 INJECTION INTRAMUSCULAR
Status: COMPLETED | OUTPATIENT
Start: 2023-08-09 | End: 2023-08-09

## 2023-08-09 RX ORDER — NITROFURANTOIN 25; 75 MG/1; MG/1
100 CAPSULE ORAL 2 TIMES DAILY
Qty: 10 CAPSULE | Refills: 0 | Status: SHIPPED | OUTPATIENT
Start: 2023-08-09 | End: 2023-08-14

## 2023-08-09 RX ADMIN — LORAZEPAM 1 MG: 2 INJECTION INTRAMUSCULAR; INTRAVENOUS at 06:08

## 2023-08-09 RX ADMIN — SODIUM CHLORIDE, POTASSIUM CHLORIDE, SODIUM LACTATE AND CALCIUM CHLORIDE 1000 ML: 600; 310; 30; 20 INJECTION, SOLUTION INTRAVENOUS at 06:08

## 2023-08-09 NOTE — ED PROVIDER NOTES
"ED PROVIDER NOTE  8/9/2023    CHIEF COMPLAINT:   Chief Complaint   Patient presents with    Emesis    Weakness    Tachycardia     States vomiting x 3 days.  Presents to ED diaphoretic and vomiting.         HISTORY OF PRESENT ILLNESS:   Salma Montero is a 28 y.o. female who presents with chief complaint Nausea and vomiting. Onset was 2 days ago when she began having nausea and vomiting that has been persistent, aggravated with movement and attempts to eat or drink. Improved with laying completely still. Associated with dizziness, palpitations, shortness of breath, malaise, fever Tmax 101, and bodyaches. States "it feels like I have the flu." She reports that she has had dizziness for years, has had syncopal episodes brought on with stretching her arms above her head along with palpitations for which she is being seen by cardiology and neurology. She states that she deals with nausea and vomiting about 3 times a week but usually not this bad. Mother reports that she seems to have these bad episodes about once a year. No known sick contacts.    The history is provided by the patient and a parent.         REVIEW OF SYSTEMS: as noted in the HPI.  NURSING NOTES REVIEWED      PAST MEDICAL/SURGICAL HISTORY:   Past Medical History:   Diagnosis Date    Dizziness     GERD (gastroesophageal reflux disease)     Headache     Obesity     Palpitations       Past Surgical History:   Procedure Laterality Date    TONSILLECTOMY AND ADENOIDECTOMY      UPPER GASTROINTESTINAL ENDOSCOPY         FAMILY HISTORY:   Family History   Problem Relation Age of Onset    Heart failure Father     Bipolar disorder Sister     Crohn's disease Sister     Cancer Maternal Grandmother     Anxiety disorder Maternal Grandmother     Alzheimer's disease Maternal Grandmother     Heart failure Maternal Grandfather     Diabetes Maternal Grandfather     Skin cancer Maternal Grandfather     Bipolar disorder Cousin     Bipolar disorder Cousin        SOCIAL " HISTORY:   Social History     Tobacco Use    Smoking status: Never    Smokeless tobacco: Never   Substance Use Topics    Alcohol use: Yes     Alcohol/week: 2.0 standard drinks of alcohol     Types: 2 Shots of liquor per week     Comment: couple times a month    Drug use: Yes     Frequency: 21.0 times per week     Types: Marijuana       ALLERGIES:   Review of patient's allergies indicates:   Allergen Reactions    Corn meal-luffa cylindrica frt Anaphylaxis, Hives, Itching and Rash    Orange Hives and Itching    Sulfa (sulfonamide antibiotics) Rash, Hives and Itching       PHYSICAL EXAM:  Initial Vitals [08/09/23 0622]   BP Pulse Resp Temp SpO2   (!) 156/81 (!) 130 18 98.4 °F (36.9 °C) 98 %      MAP       --         Physical Exam    Nursing note and vitals reviewed.  Constitutional: She appears well-developed and well-nourished. She has a sickly appearance.   HENT:   Head: Normocephalic and atraumatic.   Mouth/Throat: Uvula is midline and mucous membranes are normal.   Eyes: EOM are normal. Pupils are equal, round, and reactive to light.   Neck: Trachea normal. Neck supple.   Cardiovascular:  Regular rhythm and normal pulses.   Tachycardia present.         Pulmonary/Chest: Effort normal and breath sounds normal.   Abdominal: Abdomen is soft. Bowel sounds are normal. There is no rebound and no guarding.   Musculoskeletal:         General: Normal range of motion.      Cervical back: Neck supple.     Neurological: She is alert and oriented to person, place, and time. She has normal strength. No cranial nerve deficit or sensory deficit. GCS eye subscore is 4. GCS verbal subscore is 5. GCS motor subscore is 6.   Normal finger-nose-finger and heel shin test.   Skin: Skin is warm.   Diaphoretic.   Psychiatric: Her speech is normal. Thought content normal. Her mood appears anxious.         RESULTS:  Labs Reviewed   BASIC METABOLIC PANEL - Abnormal; Notable for the following components:       Result Value    Carbon Dioxide 20  (*)     Glucose Level 156 (*)     Creatinine 1.16 (*)     All other components within normal limits   URINALYSIS, REFLEX TO URINE CULTURE - Abnormal; Notable for the following components:    Appearance, UA Cloudy (*)     Protein, UA 3+ (*)     Glucose, UA Trace (*)     Blood, UA 2+ (*)     Urobilinogen, UA 1+ (*)     Leukocyte Esterase,  (*)     WBC, UA 21-50 (*)     Squamous Epithelial Cells, UA Few (*)     Mucous, UA Few (*)     RBC, UA 11-20 (*)     All other components within normal limits   CBC WITH DIFFERENTIAL - Abnormal; Notable for the following components:    WBC 23.00 (*)     All other components within normal limits   MANUAL DIFFERENTIAL - Abnormal; Notable for the following components:    Neutrophils % 91 (*)     Lymphs % 3 (*)     Neutrophils Abs Calc 20.93 (*)     Monocytes Abs 1.38 (*)     All other components within normal limits   B-TYPE NATRIURETIC PEPTIDE - Normal   MAGNESIUM - Normal   COVID/FLU A&B PCR - Normal    Narrative:     The Xpert Xpress SARS-CoV-2/FLU/RSV plus is a rapid, multiplexed real-time PCR test intended for the simultaneous qualitative detection and differentiation of SARS-CoV-2, Influenza A, Influenza B, and respiratory syncytial virus (RSV) viral RNA in either nasopharyngeal swab or nasal swab specimens.         CULTURE, URINE   CBC W/ AUTO DIFFERENTIAL    Narrative:     The following orders were created for panel order CBC auto differential.  Procedure                               Abnormality         Status                     ---------                               -----------         ------                     CBC with Differential[[id number]]        Abnormal            Final result               Manual Differential[[id number]]          Abnormal            Final result                 Please view results for these tests on the individual orders.   EXTRA TUBES    Narrative:     The following orders were created for panel order EXTRA TUBES.  Procedure                                Abnormality         Status                     ---------                               -----------         ------                     Light Blue Top Hold[[id number]]                              In process                 Red Top Hold[[id number]]                                     In process                 Gold Top Hold[[id number]]                                    In process                 Pink Top Hold[[id number]]                                    In process                   Please view results for these tests on the individual orders.   LIGHT BLUE TOP HOLD   RED TOP HOLD   GOLD TOP HOLD   PINK TOP HOLD   POCT URINE PREGNANCY     Imaging Results    None         PROCEDURES:  Procedures    ECG:  EKG Readings: (Independently Interpreted)   Initial Reading: No STEMI. Rhythm: Sinus Tachycardia. Heart Rate: 110. Ectopy: No Ectopy. Conduction: Normal. Axis: Normal.       ED COURSE AND MEDICAL DECISION MAKING:  Medications   lactated ringers bolus 1,000 mL (1,000 mLs Intravenous New Bag 8/9/23 0634)   LORazepam injection 1 mg (1 mg Intravenous Given 8/9/23 0645)           Medical Decision Making  Amount and/or Complexity of Data Reviewed  Labs: ordered.    Risk  Prescription drug management.      8:35 AM    At this time pt stable, VS improved, tolerating PO intake. Diagnostics reveal leukocytosis and UTI. Pt stable for discharge on oral antibiotic treatment with F/U b her PCP    CLINICAL IMPRESSION:  1. Acute cystitis with hematuria    2. Tachycardia        DISPOSITION:   ED Disposition Condition    Discharge Stable            ED Prescriptions       Medication Sig Dispense Start Date End Date Auth. Provider    nitrofurantoin, macrocrystal-monohydrate, (MACROBID) 100 MG capsule Take 1 capsule (100 mg total) by mouth 2 (two) times daily. for 5 days 10 capsule 8/9/2023 8/14/2023 Dakota Walden MD          Follow-up Information       Follow up With Specialties Details Why Contact Devante Faye  Ariela JESUS, NP Nurse Practitioner In 2 weeks  [address] [facility] [address]  [phone]      [facility] Emergency Medicine  If symptoms worsen [address] [facility] [id number] [phone]               Dakota Walden MD  08/09/23 5304

## 2023-08-11 LAB — BACTERIA UR CULT: ABNORMAL

## 2023-08-14 ENCOUNTER — OFFICE VISIT (OUTPATIENT)
Dept: FAMILY MEDICINE | Facility: CLINIC | Age: 28
End: 2023-08-14
Payer: MEDICAID

## 2023-08-14 VITALS
TEMPERATURE: 98 F | HEART RATE: 103 BPM | OXYGEN SATURATION: 97 % | HEIGHT: 66 IN | RESPIRATION RATE: 20 BRPM | BODY MASS INDEX: 34.86 KG/M2 | SYSTOLIC BLOOD PRESSURE: 120 MMHG | DIASTOLIC BLOOD PRESSURE: 84 MMHG | WEIGHT: 216.88 LBS

## 2023-08-14 DIAGNOSIS — G44.229 CHRONIC TENSION-TYPE HEADACHE, NOT INTRACTABLE: ICD-10-CM

## 2023-08-14 DIAGNOSIS — K21.01 GASTROESOPHAGEAL REFLUX DISEASE WITH ESOPHAGITIS AND HEMORRHAGE: ICD-10-CM

## 2023-08-14 DIAGNOSIS — K21.9 GASTROESOPHAGEAL REFLUX DISEASE, UNSPECIFIED WHETHER ESOPHAGITIS PRESENT: ICD-10-CM

## 2023-08-14 DIAGNOSIS — R11.0 NAUSEA: ICD-10-CM

## 2023-08-14 DIAGNOSIS — Z00.00 ENCOUNTER FOR WELLNESS EXAMINATION: Primary | ICD-10-CM

## 2023-08-14 DIAGNOSIS — R10.10 UPPER ABDOMINAL PAIN, UNSPECIFIED: ICD-10-CM

## 2023-08-14 LAB
ALBUMIN SERPL-MCNC: 3.6 G/DL (ref 3.5–5)
ALBUMIN/GLOB SERPL: 0.9 RATIO (ref 1.1–2)
ALP SERPL-CCNC: 147 UNIT/L (ref 40–150)
ALT SERPL-CCNC: 48 UNIT/L (ref 0–55)
APPEARANCE UR: CLEAR
AST SERPL-CCNC: 24 UNIT/L (ref 5–34)
BACTERIA #/AREA URNS AUTO: ABNORMAL /HPF
BASOPHILS # BLD AUTO: 0.05 X10(3)/MCL
BASOPHILS NFR BLD AUTO: 0.5 %
BILIRUB SERPL-MCNC: 0.4 MG/DL
BILIRUB UR QL STRIP.AUTO: NEGATIVE
BUN SERPL-MCNC: 11.1 MG/DL (ref 7–18.7)
CALCIUM SERPL-MCNC: 9.1 MG/DL (ref 8.4–10.2)
CHLORIDE SERPL-SCNC: 101 MMOL/L (ref 98–107)
CHOLEST SERPL-MCNC: 159 MG/DL
CHOLEST/HDLC SERPL: 6 {RATIO} (ref 0–5)
CO2 SERPL-SCNC: 24 MMOL/L (ref 22–29)
COLOR UR: YELLOW
CREAT SERPL-MCNC: 0.86 MG/DL (ref 0.55–1.02)
DEPRECATED CALCIDIOL+CALCIFEROL SERPL-MC: 58.2 NG/ML (ref 30–80)
EOSINOPHIL # BLD AUTO: 0.22 X10(3)/MCL (ref 0–0.9)
EOSINOPHIL NFR BLD AUTO: 2.3 %
ERYTHROCYTE [DISTWIDTH] IN BLOOD BY AUTOMATED COUNT: 12.1 % (ref 11.5–17)
EST. AVERAGE GLUCOSE BLD GHB EST-MCNC: 96.8 MG/DL
GFR SERPLBLD CREATININE-BSD FMLA CKD-EPI: >60 MLS/MIN/1.73/M2
GLOBULIN SER-MCNC: 4 GM/DL (ref 2.4–3.5)
GLUCOSE SERPL-MCNC: 103 MG/DL (ref 74–100)
GLUCOSE UR QL STRIP.AUTO: NORMAL
HBA1C MFR BLD: 5 %
HCT VFR BLD AUTO: 40.2 % (ref 37–47)
HDLC SERPL-MCNC: 26 MG/DL (ref 35–60)
HGB BLD-MCNC: 13.8 G/DL (ref 12–16)
HYALINE CASTS #/AREA URNS LPF: ABNORMAL /LPF
IMM GRANULOCYTES # BLD AUTO: 0.06 X10(3)/MCL (ref 0–0.04)
IMM GRANULOCYTES NFR BLD AUTO: 0.6 %
KETONES UR QL STRIP.AUTO: NEGATIVE
LDLC SERPL CALC-MCNC: 90 MG/DL (ref 50–140)
LEUKOCYTE ESTERASE UR QL STRIP.AUTO: 250
LYMPHOCYTES # BLD AUTO: 2.58 X10(3)/MCL (ref 0.6–4.6)
LYMPHOCYTES NFR BLD AUTO: 26.9 %
MCH RBC QN AUTO: 28.3 PG (ref 27–31)
MCHC RBC AUTO-ENTMCNC: 34.3 G/DL (ref 33–36)
MCV RBC AUTO: 82.5 FL (ref 80–94)
MONOCYTES # BLD AUTO: 0.99 X10(3)/MCL (ref 0.1–1.3)
MONOCYTES NFR BLD AUTO: 10.3 %
MUCOUS THREADS URNS QL MICRO: ABNORMAL /LPF
NEUTROPHILS # BLD AUTO: 5.69 X10(3)/MCL (ref 2.1–9.2)
NEUTROPHILS NFR BLD AUTO: 59.4 %
NITRITE UR QL STRIP.AUTO: NEGATIVE
NRBC BLD AUTO-RTO: 0 %
PH UR STRIP.AUTO: 6.5 [PH]
PLATELET # BLD AUTO: 377 X10(3)/MCL (ref 130–400)
PMV BLD AUTO: 9.7 FL (ref 7.4–10.4)
POTASSIUM SERPL-SCNC: 4.3 MMOL/L (ref 3.5–5.1)
PROT SERPL-MCNC: 7.6 GM/DL (ref 6.4–8.3)
PROT UR QL STRIP.AUTO: ABNORMAL
RBC # BLD AUTO: 4.87 X10(6)/MCL (ref 4.2–5.4)
RBC #/AREA URNS AUTO: ABNORMAL /HPF
RBC UR QL AUTO: NEGATIVE
SODIUM SERPL-SCNC: 138 MMOL/L (ref 136–145)
SP GR UR STRIP.AUTO: 1.02
SQUAMOUS #/AREA URNS LPF: ABNORMAL /HPF
T4 FREE SERPL-MCNC: 1.33 NG/DL (ref 0.7–1.48)
TRIGL SERPL-MCNC: 215 MG/DL (ref 37–140)
TSH SERPL-ACNC: 2.15 UIU/ML (ref 0.35–4.94)
UROBILINOGEN UR STRIP-ACNC: NORMAL
VLDLC SERPL CALC-MCNC: 43 MG/DL
WBC # SPEC AUTO: 9.59 X10(3)/MCL (ref 4.5–11.5)
WBC #/AREA URNS AUTO: ABNORMAL /HPF

## 2023-08-14 PROCEDURE — 1160F PR REVIEW ALL MEDS BY PRESCRIBER/CLIN PHARMACIST DOCUMENTED: ICD-10-PCS | Mod: CPTII,,,

## 2023-08-14 PROCEDURE — 3008F PR BODY MASS INDEX (BMI) DOCUMENTED: ICD-10-PCS | Mod: CPTII,,,

## 2023-08-14 PROCEDURE — 3008F BODY MASS INDEX DOCD: CPT | Mod: CPTII,,,

## 2023-08-14 PROCEDURE — 85025 COMPLETE CBC W/AUTO DIFF WBC: CPT

## 2023-08-14 PROCEDURE — 84443 ASSAY THYROID STIM HORMONE: CPT

## 2023-08-14 PROCEDURE — 36415 COLL VENOUS BLD VENIPUNCTURE: CPT

## 2023-08-14 PROCEDURE — 80053 COMPREHEN METABOLIC PANEL: CPT

## 2023-08-14 PROCEDURE — 3044F PR MOST RECENT HEMOGLOBIN A1C LEVEL <7.0%: ICD-10-PCS | Mod: CPTII,,,

## 2023-08-14 PROCEDURE — 1159F MED LIST DOCD IN RCRD: CPT | Mod: CPTII,,,

## 2023-08-14 PROCEDURE — 87088 URINE BACTERIA CULTURE: CPT

## 2023-08-14 PROCEDURE — 1160F RVW MEDS BY RX/DR IN RCRD: CPT | Mod: CPTII,,,

## 2023-08-14 PROCEDURE — 81001 URINALYSIS AUTO W/SCOPE: CPT

## 2023-08-14 PROCEDURE — 99214 OFFICE O/P EST MOD 30 MIN: CPT | Mod: PBBFAC,PN

## 2023-08-14 PROCEDURE — 99395 PR PREVENTIVE VISIT,EST,18-39: ICD-10-PCS | Mod: S$PBB,,,

## 2023-08-14 PROCEDURE — 99395 PREV VISIT EST AGE 18-39: CPT | Mod: S$PBB,,,

## 2023-08-14 PROCEDURE — 3079F PR MOST RECENT DIASTOLIC BLOOD PRESSURE 80-89 MM HG: ICD-10-PCS | Mod: CPTII,,,

## 2023-08-14 PROCEDURE — 84439 ASSAY OF FREE THYROXINE: CPT

## 2023-08-14 PROCEDURE — 82306 VITAMIN D 25 HYDROXY: CPT

## 2023-08-14 PROCEDURE — 3074F PR MOST RECENT SYSTOLIC BLOOD PRESSURE < 130 MM HG: ICD-10-PCS | Mod: CPTII,,,

## 2023-08-14 PROCEDURE — 83036 HEMOGLOBIN GLYCOSYLATED A1C: CPT

## 2023-08-14 PROCEDURE — 80061 LIPID PANEL: CPT

## 2023-08-14 PROCEDURE — 1159F PR MEDICATION LIST DOCUMENTED IN MEDICAL RECORD: ICD-10-PCS | Mod: CPTII,,,

## 2023-08-14 PROCEDURE — 3044F HG A1C LEVEL LT 7.0%: CPT | Mod: CPTII,,,

## 2023-08-14 PROCEDURE — 3079F DIAST BP 80-89 MM HG: CPT | Mod: CPTII,,,

## 2023-08-14 PROCEDURE — 3074F SYST BP LT 130 MM HG: CPT | Mod: CPTII,,,

## 2023-08-14 RX ORDER — PANTOPRAZOLE SODIUM 40 MG/1
40 TABLET, DELAYED RELEASE ORAL DAILY
Qty: 90 TABLET | Refills: 0 | Status: SHIPPED | OUTPATIENT
Start: 2023-08-14 | End: 2023-11-21

## 2023-08-14 RX ORDER — CETIRIZINE HYDROCHLORIDE 10 MG/1
1 TABLET ORAL DAILY
COMMUNITY

## 2023-08-14 RX ORDER — METOCLOPRAMIDE 10 MG/1
10 TABLET ORAL EVERY 6 HOURS PRN
Qty: 40 TABLET | Refills: 0 | Status: SHIPPED | OUTPATIENT
Start: 2023-08-14 | End: 2023-08-24

## 2023-08-14 RX ORDER — PROPRANOLOL HYDROCHLORIDE 20 MG/1
TABLET ORAL
COMMUNITY
End: 2023-08-14

## 2023-08-14 NOTE — ASSESSMENT & PLAN NOTE
GERD diet and precautions discussed such as:  Avoid tobacco/ alcohol, NSAID use; Avoid spicy/ acidic foods, tomato sauce, omer, caffeine, chocolate, onions  Eat smaller meals; keep HOB elevated at least 2 hours after eating  Continue with current medication regimen- Rx provided x 1 year (September 2022 with [place] approval)  Scheduled for EGD 2/22/23 with [facility] pending cardiology approval

## 2023-08-14 NOTE — PROGRESS NOTES
[facility] Neurology Follow Up Office Visit Note    Initial Visit Date: 3/8/2022  Last Visit Date: 12/13/2022  Current Visit Date:  08/16/2023    Chief Complaint:     Chief Complaint   Patient presents with    Patient presents today as a follow up from her last visit     Patient states she started the Lamictal 100 mg tablet on her last visit and she has noticed a difference with her migraines       History of Present Illness:      This is 28 y.o. female with history of of Anxiety, Depression, Hyperhidrosis, Eating disorder, Palpitations, and Tachycardia, Migraine headache. Also planning on becoming pregnant in the near future.  During last visit, lamotrigine 100 mg twice a day with titration was started.  Metoprolol succinate was decreased to 50 mg once a night.  CBC, CMP was ordered to be done 2 months.     Interim History  Headache has been stable. Still experiencing dizziness. Still have sleeping issues. Just had a UTI.      Presenting History   Of note, patient was started on Glycopyrrolate in 11/2021 while on Hydroxyzine, Prazosin, Trazodone, Klonopin, Effexor. She noted that around 12/2021, she had one episode whereby she felt dizzy, palpitation, lightheaded, followed by LOC. She noted that since then, she would experience out of body sensation, dizziness, and palpitations during activities. She also noted worsening headaches for the past few months. She reports chronic trouble staying asleep and waking up tired. She would go to bed at around 9 - 10 pm and wake up at around 8 AM in the morning. She also notes hyperhydrosis that has been consistently getting worse in recent years, requiring her to start glyocopyrrolate.      Medications:     Current Outpatient Medications   Medication Instructions    cholecalciferol (vitamin D3) (VITAMIN D3) 50 mcg, Oral, Daily    famotidine-calcium carbonate-magnesium hydroxide (PEPCID COMPLETE) -165 mg 1 tablet, Oral, Daily PRN    glycopyrrolate (ROBINUL) 1 mg, Oral, Daily     inulin-chromium picolinate (FIBER SELECT GUMMIES) 2-100 gram-mcg Chew 2 tablets, Oral, Daily    lamoTRIgine (LAMICTAL) 100 MG tablet TAKE 1 TABLET(100 MG) BY MOUTH TWICE DAILY    loratadine (CLARITIN) 10 mg, Oral, Daily    metoprolol succinate (TOPROL-XL) 50 mg, Oral, Nightly    nitrofurantoin, macrocrystal-monohydrate, (MACROBID) 100 MG capsule 100 mg, Oral, 2 times daily    OXcarbazepine (TRILEPTAL) 300 mg, Oral, 2 times daily    pantoprazole (PROTONIX) 40 mg, Oral    promethazine (PHENERGAN) 12.5 mg, Oral, As needed (PRN)    traZODone (DESYREL) 100 MG tablet Take 100-200mg by mouth nightly as needed for insomnia.    vortioxetine (TRINTELLIX) 20 mg, Oral, Daily       Labs:     Results for orders placed or performed during the hospital encounter of 08/09/23   Urine culture    Specimen: Urine   Result Value Ref Range    Urine Culture >/= 100,000 colonies/ml Escherichia coli (A)        Susceptibility    Escherichia coli -  (no method available)     Amox/K Clav 4 Sensitive      Ampicillin >=32 Resistant      Cefepime <=0.12 Sensitive      Ceftriaxone <=0.25 Sensitive      Cefuroxime 2 Sensitive      Ciprofloxacin <=0.25 Sensitive      Gentamicin <=1 Sensitive      Levofloxacin <=0.12 Sensitive      Meropenem <=0.25 Sensitive      Nitrofurantoin <=16 Sensitive      Piperacillin/Tazobactam <=4 Sensitive      Trimethoprim/Sulfamethoxazole >=320 Resistant      Tetracycline <=1 Sensitive      Tobramycin <=1 Sensitive    Basic metabolic panel   Result Value Ref Range    Sodium Level 136 136 - 145 mmol/L    Potassium Level 3.5 3.5 - 5.1 mmol/L    Chloride 103 98 - 107 mmol/L    Carbon Dioxide 20 (L) 22 - 29 mmol/L    Glucose Level 156 (H) 74 - 100 mg/dL    Blood Urea Nitrogen 12.7 7.0 - 18.7 mg/dL    Creatinine 1.16 (H) 0.55 - 1.02 mg/dL    BUN/Creatinine Ratio 11     Calcium Level Total 9.6 8.4 - 10.2 mg/dL    Anion Gap 13.0 mEq/L    eGFR >60 mls/min/1.73/m2   Brain natriuretic peptide   Result Value Ref Range     Natriuretic Peptide 14.0 <=100.0 pg/mL   Magnesium   Result Value Ref Range    Magnesium Level 1.80 1.60 - 2.60 mg/dL   Urinalysis, Reflex to Urine Culture    Specimen: Urine   Result Value Ref Range    Color, UA Yellow Yellow, Light-Yellow, Dark Yellow, Kendra, Straw    Appearance, UA Cloudy (A) Clear    Specific Gravity, UA 1.030     pH, UA 6.0 5.0 - 8.5    Protein, UA 3+ (A) Negative    Glucose, UA Trace (A) Negative, Normal    Ketones, UA Negative Negative    Blood, UA 2+ (A) Negative    Bilirubin, UA Negative Negative    Urobilinogen, UA 1+ (A) 0.2, 1.0, Normal    Nitrites, UA Negative Negative    Leukocyte Esterase,  (A) Negative    WBC, UA 21-50 (A) None Seen, 0-2, 3-5, 0-5 /HPF    Bacteria, UA None Seen None Seen /HPF    Squamous Epithelial Cells, UA Few (A) None Seen /HPF    Mucous, UA Few (A) None Seen /LPF    Non-Squamos Epithelial, UA None Seen None Seen /HPF    Hyaline Casts, UA None Seen None Seen /lpf    RBC, UA 11-20 (A) None Seen, 0-2, 3-5, 0-5 /HPF   CBC with Differential   Result Value Ref Range    WBC 23.00 (H) 4.50 - 11.50 x10(3)/mcL    RBC 5.25 4.20 - 5.40 x10(6)/mcL    Hgb 14.8 12.0 - 16.0 g/dL    Hct 43.2 37.0 - 47.0 %    MCV 82.3 80.0 - 94.0 fL    MCH 28.2 27.0 - 31.0 pg    MCHC 34.3 33.0 - 36.0 g/dL    RDW 12.2 11.5 - 17.0 %    Platelet 323 130 - 400 x10(3)/mcL    MPV 9.0 7.4 - 10.4 fL    NRBC% 0.0 %   COVID/FLU A&B PCR   Result Value Ref Range    Influenza A PCR Not Detected Not Detected    Influenza B PCR Not Detected Not Detected    SARS-CoV-2 PCR Not Detected Not Detected, Negative, Invalid   Manual Differential   Result Value Ref Range    Neutrophils % 91 (H) 47 - 80 %    Lymphs % 3 (L) 13 - 40 %    Monocytes % 6 2 - 11 %    Neutrophils Abs Calc 20.93 (H) 2.1 - 9.2 x10(3)/mcL    Lymphs Abs 0.69 0.6 - 4.6 x10(3)/mcL    Monocytes Abs 1.38 (H) 0.1 - 1.3 x10(3)/mcL    Platelets Adequate Normal, Adequate    RBC Morph Normal Normal   POCT urine pregnancy   Result Value Ref Range    POC  Preg Test, Ur Negative Negative     Acceptable Yes        Studies:      - routine EEG 1/19/2022: This is a normal routine awake EEG.  - Holter 24 hrs - 42% HR in 100s with premature beats.  - 10/20/2022 Tilt Table Test: negative.     Review of Systems:     All other systems reviewed and are negative.    Physical Exams:     Vitals:    08/16/23 1426   BP: 100/65   Pulse: 105   Temp: 98 °F (36.7 °C)       Vitals and nursing note reviewed.   Constitutional:       Appearance: Normal appearance.   HENT:      Head: Normocephalic and atraumatic.      Nose: Nose normal.      Mouth/Throat:      Mouth: Mucous membranes are moist.      Pharynx: Oropharynx is clear.   Eyes:      Conjunctiva/sclera: Conjunctivae normal.   Cardiovascular:      Rate and Rhythm: Normal rate and regular rhythm.      Pulses: Normal pulses.   Pulmonary:      Effort: Pulmonary effort is normal.      Breath sounds: Normal breath sounds.   Abdominal:      General: Abdomen is flat.   Musculoskeletal:         General: Normal range of motion.      Cervical back: Normal range of motion.   Skin:     General: Skin is warm.   Neurological:      Mental Status: She is alert.         Comprehensive Neurological Exam:  Mental Status: Alert Oriented to Self, Date, and Place. Comprehension wnl. No dysarthria.   CN II - XII: EMMA, No APD, Fundus wnl OU, VA grossly intact to finger counting at 6 ft, VFFC, No ptosis OU, EOMI without nystagmus, LT/Temp symmetric in CN V1-3 distribution, Hearing grossly intact, Face Symmetric, Tongue and Uvula midline, Trapezius symmetric bilateral.   Motor: tone and bulk wnl throughout, no abnormal involuntary or voluntary movements, 5/5 to confrontation, Fine finger movements wnl b/l, No pronator drift.   Sensory: LT, Proprioception, Vibration, PP, Temp symmetric. No sensory simultagnosia.   Reflexes: 2+ throughout, plantar reflexes downward bilateral.   Cerebellar: FNF wnl b/l, RAHM wnl b/l  Romberg: Negative  Gait:  normal.      Assessment:     This is 28 y.o. female with history of Anxiety, Depression, Hyperhidrosis, Eating disorder, Palpitations, and Tachycardia, Migraine headache. Also planning on becoming pregnant in the near future.     Problem List Items Addressed This Visit          Neuro    Migraine headache - Primary    Relevant Medications    lamoTRIgine (LAMICTAL) 150 MG Tab       Psychiatric    Moderate episode of recurrent major depressive disorder (Chronic)    Relevant Medications    OXcarbazepine (TRILEPTAL) 150 MG Tab    folic acid (FOLVITE) 1 MG tablet    FLORENCE (generalized anxiety disorder) (Chronic)    Relevant Medications    OXcarbazepine (TRILEPTAL) 150 MG Tab    PTSD (post-traumatic stress disorder) (Chronic)    Relevant Medications    OXcarbazepine (TRILEPTAL) 150 MG Tab       Other    Hyperhidrosis         Plan:     [] increase Lamotrigine to 150 mg twice a day (300 mg in 24 hours)   [] decrease Oxcarbazepine to 150 mg twice a day  (300 mg in 24 hours)   [] continue with Metoprolol ER 50 mg once at night.   [] start Folic acid 1 mg daily    RTC 4 months      I have explained the treatment plan, diagnosis, and prognosis to patient. All questions are answered to the best of my knowledge.     Face to face time 40 minutes, including documentation, chart review, counseling, education, review of test results, relevant medical records, and coordination of care.   I have explained the treatment plan, diagnosis, and prognosis to patient. All questions are answered to the best of my knowledge.       Daly Colon MD   General Neurology  08/16/2023

## 2023-08-15 NOTE — PROGRESS NOTES
Patient Name: Salma Montero     : 1995    MRN: [id number]     Subjective:     Patient ID: Salma Montero is a 28 y.o. female.    Chief Complaint:   Chief Complaint   Patient presents with    [facility]     New patient. States had appointment last week but had to cancel due to being sick. States went to ER. States has loss of appetite. Hx eating d/o. States zofran and phenergan doesn't work at times.         HPI: 2023: patient presents to clinic today [facility], previously followed Ariela Faye in [facility]; leaving due to inability to schedule longer than 15 min appointment. PMhx includes tachycardia, syncope, hyperhidrosis, anxiety/ depression, h/o eating disorder (childhood), GERD, migraine HA, chronic AR, and obesity. Many of her complaints have since being resolved by external providers. She states her stomach is still bothering her but she cannot get any more work up due to inability to get cardiac clearance.  States her pain is mainly in her RUQ, occasionally on her RLQ. Denies ever having gallbladder work up. Endorses frequent nausea but states Zofran and phenergan are no longer working. Patient denies chest pain, palpitations, and shortness of breath.  Patient denies fever, night sweats, chills, nausea, vomiting, diarrhea, constipation, weight loss, and changes in appetite.        External providers   [facility] [place], [place] for GYN  [facility] Neurology (Dr. Hernandez)  [facility] Cardiology, being referred to  [place] for further work up.  [facility] GI- EGD scheduled 23 was cancelled due to no cardiac clearance per patient.  Dr. Ed Olea- Dermatologist        ROS:      12 point review of systems conducted, negative except as stated in the history of present illness. See HPI for details.    History:     Past Medical History:   Diagnosis Date    Anxiety     Depression     Dizziness     GERD (gastroesophageal reflux disease)     Headache     Obesity      Palpitations     PTSD (post-traumatic stress disorder)         Past Surgical History:   Procedure Laterality Date    TONSILLECTOMY AND ADENOIDECTOMY      UPPER GASTROINTESTINAL ENDOSCOPY         Family History   Problem Relation Age of Onset    Heart failure Father     Bipolar disorder Sister     Crohn's disease Sister     Cancer Maternal Grandmother     Anxiety disorder Maternal Grandmother     Alzheimer's disease Maternal Grandmother     Heart failure Maternal Grandfather     Diabetes Maternal Grandfather     Skin cancer Maternal Grandfather     Bipolar disorder Cousin     Bipolar disorder Cousin         Social History     Tobacco Use    Smoking status: Never    Smokeless tobacco: Never   Substance and Sexual Activity    Alcohol use: Yes     Alcohol/week: 2.0 standard drinks of alcohol     Types: 2 Shots of liquor per week     Comment: couple times a month    Drug use: Yes     Frequency: 21.0 times per week     Types: Marijuana    Sexual activity: Yes     Partners: Male     Birth control/protection: OCP       Current Outpatient Medications   Medication Instructions    cetirizine (ALLER-VALE) 10 MG tablet 1 tablet, Oral, Daily    cholecalciferol (vitamin D3) (VITAMIN D3) 50 mcg, Oral, Daily    famotidine-calcium carbonate-magnesium hydroxide (PEPCID COMPLETE) -165 mg 1 tablet, Oral, Daily PRN    glycopyrrolate (ROBINUL) 1 mg, Oral, Daily    inulin-chromium picolinate (FIBER SELECT GUMMIES) 2-100 gram-mcg Chew 2 tablets, Oral, Daily    lamoTRIgine (LAMICTAL) 100 MG tablet TAKE 1 TABLET(100 MG) BY MOUTH TWICE DAILY    loratadine (CLARITIN) 10 mg, Oral, Daily    metoclopramide HCl (REGLAN) 10 mg, Oral, Every 6 hours PRN    metoprolol succinate (TOPROL-XL) 50 mg, Oral, Nightly    nitrofurantoin, macrocrystal-monohydrate, (MACROBID) 100 MG capsule 100 mg, Oral, 2 times daily    OXcarbazepine (TRILEPTAL) 300 mg, Oral, 2 times daily    pantoprazole (PROTONIX) 40 mg, Oral, Daily    traZODone (DESYREL) 100 MG tablet  "Take 100-200mg by mouth nightly as needed for insomnia.    vortioxetine (TRINTELLIX) 20 mg, Oral, Daily        Review of patient's allergies indicates:   Allergen Reactions    Corn meal-luffa cylindrica frt Anaphylaxis, Hives, Itching and Rash    Orange Hives and Itching    Sulfa (sulfonamide antibiotics) Rash, Hives and Itching       Objective:     Visit Vitals  /84 (BP Location: Right arm, Patient Position: Sitting)   Pulse 103   Temp 97.9 °F (36.6 °C) (Oral)   Resp 20   Ht 5' 6" (1.676 m)   Wt 98.4 kg (216 lb 14.4 oz)   LMP 07/29/2023 (Approximate)   SpO2 97%   BMI 35.01 kg/m²       Physical Examination:     Physical Exam  Vitals reviewed.   Constitutional:       Appearance: Normal appearance. She is normal weight.   HENT:      Head: Normocephalic.      Right Ear: Tympanic membrane, ear canal and external ear normal.      Left Ear: Tympanic membrane, ear canal and external ear normal.      Nose: Nose normal.      Mouth/Throat:      Mouth: Mucous membranes are moist.      Pharynx: Oropharynx is clear.   Eyes:      Extraocular Movements: Extraocular movements intact.      Conjunctiva/sclera: Conjunctivae normal.      Pupils: Pupils are equal, round, and reactive to light.   Cardiovascular:      Rate and Rhythm: Normal rate and regular rhythm.      Pulses: Normal pulses.      Heart sounds: Normal heart sounds.   Pulmonary:      Effort: Pulmonary effort is normal.      Breath sounds: Normal breath sounds.   Abdominal:      General: Abdomen is flat. Bowel sounds are normal.      Palpations: Abdomen is soft.   Musculoskeletal:         General: Normal range of motion.      Cervical back: Normal range of motion and neck supple.   Skin:     General: Skin is warm and dry.   Neurological:      General: No focal deficit present.      Mental Status: She is alert and oriented to person, place, and time.   Psychiatric:         Mood and Affect: Mood normal.         Behavior: Behavior normal.         Lab Results: "     Chemistry:  Lab Results   Component Value Date     08/14/2023    K 4.3 08/14/2023    CHLORIDE 101 08/14/2023    BUN 11.1 08/14/2023    CREATININE 0.86 08/14/2023    EGFRNORACEVR >60 08/14/2023    GLUCOSE 103 (H) 08/14/2023    CALCIUM 9.1 08/14/2023    ALKPHOS 147 08/14/2023    LABPROT 7.6 08/14/2023    ALBUMIN 3.6 08/14/2023    BILIDIR 0.1 10/20/2021    IBILI 0.10 10/20/2021    AST 24 08/14/2023    ALT 48 08/14/2023    MG 1.80 08/09/2023    RPZCWFKI89OC 58.2 08/14/2023    TSH 2.145 08/14/2023    TIGSLK5AQFS 1.33 08/14/2023        Lab Results   Component Value Date    HGBA1C 5.0 08/14/2023        Hematology:  Lab Results   Component Value Date    WBC 9.59 08/14/2023    HGB 13.8 08/14/2023    HCT 40.2 08/14/2023     08/14/2023       Lipid Panel:  Lab Results   Component Value Date    CHOL 159 08/14/2023    HDL 26 (L) 08/14/2023    LDL 90.00 08/14/2023    TRIG 215 (H) 08/14/2023    TOTALCHOLEST 6 (H) 08/14/2023        Urine:  Lab Results   Component Value Date    COLORUA Yellow 08/14/2023    APPEARANCEUA Clear 08/14/2023    SGUA 1.017 08/14/2023    PHUA 6.5 08/14/2023    PROTEINUA 1+ (A) 08/14/2023    GLUCOSEUA Normal 08/14/2023    KETONESUA Negative 08/14/2023    BLOODUA Negative 08/14/2023    NITRITESUA Negative 08/14/2023    LEUKOCYTESUR 250 (A) 08/14/2023    RBCUA 0-5 08/14/2023    WBCUA 21-50 (A) 08/14/2023    BACTERIA Trace (A) 08/14/2023    SQEPUA Moderate (A) 08/14/2023    HYALINECASTS None Seen 08/14/2023        Assessment:          ICD-10-CM ICD-9-CM   1. Encounter for wellness examination  Z00.00 V70.0   2. Gastroesophageal reflux disease, unspecified whether esophagitis present  K21.9 530.81   3. Gastroesophageal reflux disease with esophagitis and hemorrhage  K21.01 530.81     530.19   4. Nausea  R11.0 787.02   5. Chronic tension-type headache, not intractable  G44.229 339.12   6. Upper abdominal pain, unspecified  R10.10 789.09        Plan:     1. Encounter for wellness examination  -      TSH  -     T4, Free  -     Hemoglobin A1C  -     Lipid Panel  -     CBC Auto Differential  -     Comprehensive Metabolic Panel  -     Vitamin D  -     Urinalysis, Reflex to Urine Culture  -     Urine culture    2. Gastroesophageal reflux disease, unspecified whether esophagitis present  Assessment & Plan:  GERD diet and precautions discussed such as:  Avoid tobacco/ alcohol, NSAID use; Avoid spicy/ acidic foods, tomato sauce, omer, caffeine, chocolate, onions  Eat smaller meals; keep HOB elevated at least 2 hours after eating  Continue with current medication regimen- Rx provided x 1 year (September 2022 with [place] approval)  Scheduled for EGD 2/22/23 with [facility] GI pending cardiology approval    Orders:  -     metoclopramide HCl (REGLAN) 10 MG tablet; Take 1 tablet (10 mg total) by mouth every 6 (six) hours as needed (nausea).  Dispense: 40 tablet; Refill: 0    3. Gastroesophageal reflux disease with esophagitis and hemorrhage  Assessment & Plan:  GERD diet and precautions discussed such as:  Avoid tobacco/ alcohol, NSAID use; Avoid spicy/ acidic foods, tomato sauce, omer, caffeine, chocolate, onions  Eat smaller meals; keep HOB elevated at least 2 hours after eating  Continue with current medication regimen- Rx provided x 1 year (September 2022 with [place] approval)  Scheduled for EGD 2/22/23 with [facility] GI pending cardiology approval    Orders:  -     pantoprazole (PROTONIX) 40 MG tablet; Take 1 tablet (40 mg total) by mouth once daily.  Dispense: 90 tablet; Refill: 0    4. Nausea  -     metoclopramide HCl (REGLAN) 10 MG tablet; Take 1 tablet (10 mg total) by mouth every 6 (six) hours as needed (nausea).  Dispense: 40 tablet; Refill: 0    5. Chronic tension-type headache, not intractable    6. Upper abdominal pain, unspecified  -     NM Hepatobiliary Imaging HIDA; Future; Expected date: 08/14/2023         Follow up in about 2 weeks (around 8/28/2023) for review labs, follow up GI work up for R abd pain.    Future Appointments    Date Time Provider Department [place]   8/16/2023  2:15 PM Daly Colon MD [facility] NEURO [place]   9/6/2023 11:30 AM Damian Ward MD [facility]   11/14/2023  9:15 AM Desiree Kiser NP [facility]   1/22/2024  9:45 AM Idalia Alegria, [place] [facility] CARD [place]         Desiree Kiser NP

## 2023-08-16 ENCOUNTER — OFFICE VISIT (OUTPATIENT)
Dept: NEUROLOGY | Facility: CLINIC | Age: 28
End: 2023-08-16
Payer: MEDICAID

## 2023-08-16 VITALS
TEMPERATURE: 98 F | DIASTOLIC BLOOD PRESSURE: 65 MMHG | BODY MASS INDEX: 35.2 KG/M2 | OXYGEN SATURATION: 96 % | WEIGHT: 219 LBS | SYSTOLIC BLOOD PRESSURE: 100 MMHG | HEIGHT: 66 IN | HEART RATE: 105 BPM

## 2023-08-16 DIAGNOSIS — R61 HYPERHIDROSIS: ICD-10-CM

## 2023-08-16 DIAGNOSIS — G43.709 CHRONIC MIGRAINE WITHOUT AURA WITHOUT STATUS MIGRAINOSUS, NOT INTRACTABLE: Primary | ICD-10-CM

## 2023-08-16 DIAGNOSIS — F43.10 PTSD (POST-TRAUMATIC STRESS DISORDER): Chronic | ICD-10-CM

## 2023-08-16 DIAGNOSIS — F41.1 GAD (GENERALIZED ANXIETY DISORDER): Chronic | ICD-10-CM

## 2023-08-16 DIAGNOSIS — F33.1 MODERATE EPISODE OF RECURRENT MAJOR DEPRESSIVE DISORDER: Chronic | ICD-10-CM

## 2023-08-16 PROCEDURE — 3078F PR MOST RECENT DIASTOLIC BLOOD PRESSURE < 80 MM HG: ICD-10-PCS | Mod: CPTII,,, | Performed by: PSYCHIATRY & NEUROLOGY

## 2023-08-16 PROCEDURE — 99214 OFFICE O/P EST MOD 30 MIN: CPT | Mod: PBBFAC | Performed by: PSYCHIATRY & NEUROLOGY

## 2023-08-16 PROCEDURE — 1159F PR MEDICATION LIST DOCUMENTED IN MEDICAL RECORD: ICD-10-PCS | Mod: CPTII,,, | Performed by: PSYCHIATRY & NEUROLOGY

## 2023-08-16 PROCEDURE — 3044F PR MOST RECENT HEMOGLOBIN A1C LEVEL <7.0%: ICD-10-PCS | Mod: CPTII,,, | Performed by: PSYCHIATRY & NEUROLOGY

## 2023-08-16 PROCEDURE — 3074F PR MOST RECENT SYSTOLIC BLOOD PRESSURE < 130 MM HG: ICD-10-PCS | Mod: CPTII,,, | Performed by: PSYCHIATRY & NEUROLOGY

## 2023-08-16 PROCEDURE — 1159F MED LIST DOCD IN RCRD: CPT | Mod: CPTII,,, | Performed by: PSYCHIATRY & NEUROLOGY

## 2023-08-16 PROCEDURE — 3074F SYST BP LT 130 MM HG: CPT | Mod: CPTII,,, | Performed by: PSYCHIATRY & NEUROLOGY

## 2023-08-16 PROCEDURE — 3044F HG A1C LEVEL LT 7.0%: CPT | Mod: CPTII,,, | Performed by: PSYCHIATRY & NEUROLOGY

## 2023-08-16 PROCEDURE — 3008F BODY MASS INDEX DOCD: CPT | Mod: CPTII,,, | Performed by: PSYCHIATRY & NEUROLOGY

## 2023-08-16 PROCEDURE — 99215 PR OFFICE/OUTPT VISIT, EST, LEVL V, 40-54 MIN: ICD-10-PCS | Mod: S$PBB,,, | Performed by: PSYCHIATRY & NEUROLOGY

## 2023-08-16 PROCEDURE — 3078F DIAST BP <80 MM HG: CPT | Mod: CPTII,,, | Performed by: PSYCHIATRY & NEUROLOGY

## 2023-08-16 PROCEDURE — 3008F PR BODY MASS INDEX (BMI) DOCUMENTED: ICD-10-PCS | Mod: CPTII,,, | Performed by: PSYCHIATRY & NEUROLOGY

## 2023-08-16 PROCEDURE — 99215 OFFICE O/P EST HI 40 MIN: CPT | Mod: S$PBB,,, | Performed by: PSYCHIATRY & NEUROLOGY

## 2023-08-16 RX ORDER — LAMOTRIGINE 150 MG/1
150 TABLET ORAL 2 TIMES DAILY
Qty: 60 TABLET | Refills: 3 | Status: SHIPPED | OUTPATIENT
Start: 2023-08-16 | End: 2023-11-03 | Stop reason: SDUPTHER

## 2023-08-16 RX ORDER — FOLIC ACID 1 MG/1
1 TABLET ORAL DAILY
Qty: 30 TABLET | Refills: 4 | Status: SHIPPED | OUTPATIENT
Start: 2023-08-16 | End: 2023-12-19 | Stop reason: SDUPTHER

## 2023-08-16 RX ORDER — OXCARBAZEPINE 150 MG/1
150 TABLET, FILM COATED ORAL 2 TIMES DAILY
Qty: 60 TABLET | Refills: 2 | Status: SHIPPED | OUTPATIENT
Start: 2023-08-16 | End: 2023-12-19

## 2023-08-17 LAB — BACTERIA UR CULT: NORMAL

## 2023-08-24 ENCOUNTER — OFFICE VISIT (OUTPATIENT)
Dept: FAMILY MEDICINE | Facility: CLINIC | Age: 28
End: 2023-08-24
Payer: MEDICAID

## 2023-08-24 VITALS
HEART RATE: 89 BPM | TEMPERATURE: 98 F | WEIGHT: 229.69 LBS | OXYGEN SATURATION: 97 % | RESPIRATION RATE: 20 BRPM | DIASTOLIC BLOOD PRESSURE: 81 MMHG | BODY MASS INDEX: 36.92 KG/M2 | SYSTOLIC BLOOD PRESSURE: 138 MMHG | HEIGHT: 66 IN

## 2023-08-24 DIAGNOSIS — E28.2 PCOS (POLYCYSTIC OVARIAN SYNDROME): ICD-10-CM

## 2023-08-24 DIAGNOSIS — R39.9 URINARY SYMPTOM OR SIGN: Primary | ICD-10-CM

## 2023-08-24 LAB
APPEARANCE UR: ABNORMAL
BILIRUB UR QL STRIP.AUTO: NEGATIVE
COLOR UR: ABNORMAL
GLUCOSE UR QL STRIP.AUTO: NORMAL
KETONES UR QL STRIP.AUTO: NEGATIVE
LEUKOCYTE ESTERASE UR QL STRIP.AUTO: 500
NITRITE UR QL STRIP.AUTO: NEGATIVE
PH UR STRIP.AUTO: 6.5 [PH]
PROT UR QL STRIP.AUTO: NEGATIVE
RBC #/AREA URNS AUTO: ABNORMAL /HPF
RBC UR QL AUTO: ABNORMAL
SP GR UR STRIP.AUTO: 1.01
SQUAMOUS #/AREA URNS LPF: ABNORMAL /HPF
UROBILINOGEN UR STRIP-ACNC: NORMAL
WBC #/AREA URNS AUTO: ABNORMAL /HPF
WBC CLUMPS UR QL AUTO: ABNORMAL /HPF

## 2023-08-24 PROCEDURE — 3008F BODY MASS INDEX DOCD: CPT | Mod: CPTII,,,

## 2023-08-24 PROCEDURE — 3077F SYST BP >= 140 MM HG: CPT | Mod: CPTII,,,

## 2023-08-24 PROCEDURE — 3044F HG A1C LEVEL LT 7.0%: CPT | Mod: CPTII,,,

## 2023-08-24 PROCEDURE — 87088 URINE BACTERIA CULTURE: CPT

## 2023-08-24 PROCEDURE — 99214 OFFICE O/P EST MOD 30 MIN: CPT | Mod: S$PBB,,,

## 2023-08-24 PROCEDURE — 87077 CULTURE AEROBIC IDENTIFY: CPT

## 2023-08-24 PROCEDURE — 3077F PR MOST RECENT SYSTOLIC BLOOD PRESSURE >= 140 MM HG: ICD-10-PCS | Mod: CPTII,,,

## 2023-08-24 PROCEDURE — 81001 URINALYSIS AUTO W/SCOPE: CPT

## 2023-08-24 PROCEDURE — 99214 PR OFFICE/OUTPT VISIT, EST, LEVL IV, 30-39 MIN: ICD-10-PCS | Mod: S$PBB,,,

## 2023-08-24 PROCEDURE — 1159F MED LIST DOCD IN RCRD: CPT | Mod: CPTII,,,

## 2023-08-24 PROCEDURE — 3044F PR MOST RECENT HEMOGLOBIN A1C LEVEL <7.0%: ICD-10-PCS | Mod: CPTII,,,

## 2023-08-24 PROCEDURE — 3079F PR MOST RECENT DIASTOLIC BLOOD PRESSURE 80-89 MM HG: ICD-10-PCS | Mod: CPTII,,,

## 2023-08-24 PROCEDURE — 1160F RVW MEDS BY RX/DR IN RCRD: CPT | Mod: CPTII,,,

## 2023-08-24 PROCEDURE — 3079F DIAST BP 80-89 MM HG: CPT | Mod: CPTII,,,

## 2023-08-24 PROCEDURE — 99215 OFFICE O/P EST HI 40 MIN: CPT | Mod: PBBFAC,PN

## 2023-08-24 PROCEDURE — 3008F PR BODY MASS INDEX (BMI) DOCUMENTED: ICD-10-PCS | Mod: CPTII,,,

## 2023-08-24 PROCEDURE — 1159F PR MEDICATION LIST DOCUMENTED IN MEDICAL RECORD: ICD-10-PCS | Mod: CPTII,,,

## 2023-08-24 PROCEDURE — 1160F PR REVIEW ALL MEDS BY PRESCRIBER/CLIN PHARMACIST DOCUMENTED: ICD-10-PCS | Mod: CPTII,,,

## 2023-08-24 RX ORDER — CIPROFLOXACIN 250 MG/1
500 TABLET, FILM COATED ORAL EVERY 12 HOURS
Qty: 12 TABLET | Refills: 0 | Status: SHIPPED | OUTPATIENT
Start: 2023-08-24 | End: 2023-08-28 | Stop reason: SDUPTHER

## 2023-08-24 NOTE — PROGRESS NOTES
"Subjective:      Salma Montero is a 28 y.o. female who complains of abnormal smelling urine, dysuria, hesitancy, and incomplete bladder emptying for 4 days.  Patient also complains of  cloudy urine . Patient denies back pain, congestion, cough, fever, stomach ache, and vaginal discharge.  Patient does have a history of recurrent UTI.  Patient does not have a history of pyelonephritis.  The following portions of the patient's history were reviewed and updated as appropriate: allergies, current medications, past family history, past medical history, past social history, past surgical history, and problem list.  Patient is additionally requesting referral to gynecologist, previously she wanted to only see female providers due to previous experiences.  She is amenable to male provider to help manage her PCOS and endometriosis.  Patient has been on multiple birth controls and metformin for these conditions and is still having many complaints including irregular cycles and pain.    Review of Systems  Twelve point review of systems conducted all negative unless otherwise noted in HPI      Objective:      /81 (BP Location: Left arm, Patient Position: Sitting)   Pulse 89   Temp 97.5 °F (36.4 °C) (Oral)   Resp 20   Ht 5' 6" (1.676 m)   Wt 104.2 kg (229 lb 11.2 oz)   LMP 08/22/2023 (Exact Date)   SpO2 97%   BMI 37.07 kg/m²   General: alert, appears stated age, and cooperative   Abdomen: soft, non-tender, without masses or organomegaly    Back: CVA tenderness absent   : defer exam     Laboratory:   Urine dipstick shows  pending .    Micro exam:  many frequent post intercourse UTI .      Assessment:      Acute cystitis      Plan:  Plan:      1. Medications: ciprofloxacin  2. Maintain adequate hydration  3. Follow up if symptoms not improving, and prn.     "

## 2023-08-26 LAB — BACTERIA UR CULT: ABNORMAL

## 2023-08-28 DIAGNOSIS — R39.9 URINARY SYMPTOM OR SIGN: ICD-10-CM

## 2023-08-28 RX ORDER — CIPROFLOXACIN 500 MG/1
500 TABLET ORAL EVERY 8 HOURS
Qty: 15 TABLET | Refills: 0 | Status: SHIPPED | OUTPATIENT
Start: 2023-08-28 | End: 2023-09-02

## 2023-08-29 ENCOUNTER — HOSPITAL ENCOUNTER (OUTPATIENT)
Dept: RADIOLOGY | Facility: HOSPITAL | Age: 28
Discharge: HOME | End: 2023-08-29
Payer: MEDICAID

## 2023-08-29 DIAGNOSIS — R10.10 UPPER ABDOMINAL PAIN, UNSPECIFIED: ICD-10-CM

## 2023-08-29 PROCEDURE — 78226 HEPATOBILIARY SYSTEM IMAGING: CPT | Mod: TC

## 2023-08-30 ENCOUNTER — PATIENT MESSAGE (OUTPATIENT)
Dept: FAMILY MEDICINE | Facility: CLINIC | Age: 28
End: 2023-08-30
Payer: MEDICAID

## 2023-08-30 DIAGNOSIS — R10.9 ABDOMINAL PAIN, UNSPECIFIED ABDOMINAL LOCATION: Primary | ICD-10-CM

## 2023-09-06 ENCOUNTER — OFFICE VISIT (OUTPATIENT)
Dept: BEHAVIORAL HEALTH | Facility: CLINIC | Age: 28
End: 2023-09-06
Payer: MEDICAID

## 2023-09-06 VITALS
SYSTOLIC BLOOD PRESSURE: 106 MMHG | TEMPERATURE: 98 F | DIASTOLIC BLOOD PRESSURE: 72 MMHG | WEIGHT: 228.13 LBS | BODY MASS INDEX: 36.82 KG/M2 | HEART RATE: 79 BPM | OXYGEN SATURATION: 100 %

## 2023-09-06 DIAGNOSIS — F43.10 PTSD (POST-TRAUMATIC STRESS DISORDER): Chronic | ICD-10-CM

## 2023-09-06 DIAGNOSIS — G47.00 INSOMNIA, UNSPECIFIED TYPE: ICD-10-CM

## 2023-09-06 DIAGNOSIS — F41.1 GAD (GENERALIZED ANXIETY DISORDER): Chronic | ICD-10-CM

## 2023-09-06 DIAGNOSIS — F33.1 MODERATE EPISODE OF RECURRENT MAJOR DEPRESSIVE DISORDER: Primary | Chronic | ICD-10-CM

## 2023-09-06 PROCEDURE — 3044F HG A1C LEVEL LT 7.0%: CPT | Mod: CPTII,,, | Performed by: STUDENT IN AN ORGANIZED HEALTH CARE EDUCATION/TRAINING PROGRAM

## 2023-09-06 PROCEDURE — 1159F MED LIST DOCD IN RCRD: CPT | Mod: CPTII,,, | Performed by: STUDENT IN AN ORGANIZED HEALTH CARE EDUCATION/TRAINING PROGRAM

## 2023-09-06 PROCEDURE — 3074F PR MOST RECENT SYSTOLIC BLOOD PRESSURE < 130 MM HG: ICD-10-PCS | Mod: CPTII,,, | Performed by: STUDENT IN AN ORGANIZED HEALTH CARE EDUCATION/TRAINING PROGRAM

## 2023-09-06 PROCEDURE — 3008F BODY MASS INDEX DOCD: CPT | Mod: CPTII,,, | Performed by: STUDENT IN AN ORGANIZED HEALTH CARE EDUCATION/TRAINING PROGRAM

## 2023-09-06 PROCEDURE — 1159F PR MEDICATION LIST DOCUMENTED IN MEDICAL RECORD: ICD-10-PCS | Mod: CPTII,,, | Performed by: STUDENT IN AN ORGANIZED HEALTH CARE EDUCATION/TRAINING PROGRAM

## 2023-09-06 PROCEDURE — 99214 OFFICE O/P EST MOD 30 MIN: CPT | Mod: AF,HB,S$PBB, | Performed by: STUDENT IN AN ORGANIZED HEALTH CARE EDUCATION/TRAINING PROGRAM

## 2023-09-06 PROCEDURE — 3078F PR MOST RECENT DIASTOLIC BLOOD PRESSURE < 80 MM HG: ICD-10-PCS | Mod: CPTII,,, | Performed by: STUDENT IN AN ORGANIZED HEALTH CARE EDUCATION/TRAINING PROGRAM

## 2023-09-06 PROCEDURE — 3078F DIAST BP <80 MM HG: CPT | Mod: CPTII,,, | Performed by: STUDENT IN AN ORGANIZED HEALTH CARE EDUCATION/TRAINING PROGRAM

## 2023-09-06 PROCEDURE — 99213 OFFICE O/P EST LOW 20 MIN: CPT | Mod: PBBFAC,PN | Performed by: STUDENT IN AN ORGANIZED HEALTH CARE EDUCATION/TRAINING PROGRAM

## 2023-09-06 PROCEDURE — 99214 PR OFFICE/OUTPT VISIT, EST, LEVL IV, 30-39 MIN: ICD-10-PCS | Mod: AF,HB,S$PBB, | Performed by: STUDENT IN AN ORGANIZED HEALTH CARE EDUCATION/TRAINING PROGRAM

## 2023-09-06 PROCEDURE — 1160F PR REVIEW ALL MEDS BY PRESCRIBER/CLIN PHARMACIST DOCUMENTED: ICD-10-PCS | Mod: CPTII,,, | Performed by: STUDENT IN AN ORGANIZED HEALTH CARE EDUCATION/TRAINING PROGRAM

## 2023-09-06 PROCEDURE — 3008F PR BODY MASS INDEX (BMI) DOCUMENTED: ICD-10-PCS | Mod: CPTII,,, | Performed by: STUDENT IN AN ORGANIZED HEALTH CARE EDUCATION/TRAINING PROGRAM

## 2023-09-06 PROCEDURE — 3074F SYST BP LT 130 MM HG: CPT | Mod: CPTII,,, | Performed by: STUDENT IN AN ORGANIZED HEALTH CARE EDUCATION/TRAINING PROGRAM

## 2023-09-06 PROCEDURE — 1160F RVW MEDS BY RX/DR IN RCRD: CPT | Mod: CPTII,,, | Performed by: STUDENT IN AN ORGANIZED HEALTH CARE EDUCATION/TRAINING PROGRAM

## 2023-09-06 PROCEDURE — 3044F PR MOST RECENT HEMOGLOBIN A1C LEVEL <7.0%: ICD-10-PCS | Mod: CPTII,,, | Performed by: STUDENT IN AN ORGANIZED HEALTH CARE EDUCATION/TRAINING PROGRAM

## 2023-09-06 RX ORDER — TEMAZEPAM 15 MG/1
15 CAPSULE ORAL NIGHTLY PRN
Qty: 14 CAPSULE | Refills: 0 | Status: SHIPPED | OUTPATIENT
Start: 2023-09-06 | End: 2023-09-20

## 2023-09-06 NOTE — PROGRESS NOTES
"Outpatient Psychiatry Follow-Up Visit    9/6/2023    Clinical Status of Patient:  Outpatient (Ambulatory)    Chief Complaint:  Salma Montero is a 28 y.o. female who presents today for follow-up of MDD, FLORENCE, PTSD, insomnia. Patient last seen for follow-up on 7/6/2023.  Met with patient.      Interval History and Content of Current Session:  Interim Events/Subjective Report/Content of Current Session:   Pt reports doing "ok"  overall.  Reports labile mood, notes intermittent depressed mood, "not bad" anxiety.  Sleep fair, sleeping 8-10 hrs nightly, having difficulty sleeping through the night. Appetite decreased, weight increased (has been drinking meal replacement).  Energy low, motivation low  concentration poor.  Endorses irritability, denies hopelessness.  Denies SI/HI/AVH/paranoia, denies plan or desire for self harm or harm to others.  Denies SE from current regimen. Denies change in chronic somatic complaints. Pt voices desire to adjust regimen to address ongoing symptoms.      Review of Systems   PSYCHIATRIC: Pertinant items are noted in the narrative.  CONSTITUTIONAL: No weight gain or loss.  MUSCULOSKELETAL: No pain or stiffness of the joints.  NEUROLOGIC: No weakness, sensory changes, seizures, confusion, memory loss, tremor or other abnormal movements.    RESPIRATORY: No shortness of breath.  CARDIOVASCULAR: No tachycardia or chest pain.    GASTROINTESTINAL: denies vomiting poor appetite, diarrhea.      Past Medical, Family and Social History: The patient's past medical, family and social history have been reviewed and updated as appropriate within the electronic medical record - see encounter notes.    Compliance: good    Side effects: denies    Risk Parameters:  Patient reports no suicidal ideation  Patient reports no homicidal ideation  Patient reports no self-injurious behavior  Patient reports no violent behavior    Exam (detailed: at least 9 elements; comprehensive: all 15 elements) " "  Constitutional  Vitals:  Most recent vital signs, dated less than 90 days prior to this appointment, were reviewed.     Vitals:    09/06/23 1110   BP: 106/72   Pulse: 79   Temp: 98.3 °F (36.8 °C)   SpO2: 100%   Weight: 103.5 kg (228 lb 1.6 oz)        General:   Constitutional: No acute distress, appears stated age, casually dressed    Neurologic:   Motor: moves all extremities spontaneously and without difficulty, no abnormal involuntary movements observed  Gait: normal gait and station    Mental status examination:   Appearance: appears stated age, casually dressed, no acute distress  Behavior: tearful at times  Mood: "alright"  Affect: mood congruent, constricted, brighter  Thought process: linear and goal directed  Associations: appropriate for conversation  Thought content: no plan or desire for self harm or harm to others, denies paranoia, no delusional ideation volunteered  Perceptions: denies hallucinations or other altered perceptions  Orientation: oriented to day of week, month, year, location and situation  Language: English, fluid  Attention: able to attend to interview  Insight: good  Judgement: good    PHQ9:  Over the last two weeks how often have you been bothered by little interest or pleasure in doing things: 2  Over the last two weeks how often have you been bothered by feeling down, depressed or hopeless: 3  PHQ-2 Total Score: 5  PHQ-4 Score: 7  PHQ-9 Score: 18  PHQ-9 Interpretation: Moderately Severe        9/6/2023    11:09 AM 7/6/2023    11:26 AM 5/24/2023    11:31 AM   GAD7   1. Feeling nervous, anxious, or on edge? 3 1 3   2. Not being able to stop or control worrying? 3 3 3   3. Worrying too much about different things? 3 3 3   4. Trouble relaxing? 3 1 3   5. Being so restless that it is hard to sit still? 3 0 3   6. Becoming easily annoyed or irritable? 3 3 3   7. Feeling afraid as if something awful might happen? 3 3 3   8. If you checked off any problems, how difficult have these " problems made it for you to do your work, take care of things at home, or get along with other people? 3 1 3   FLORENCE-7 Score 21 14 21     Assessment and Diagnosis   Status/Progress: Based on the examination today, the patient's problem(s) is/are adequately but not ideally controlled.  New problems have not been presented today.   Co-morbidities and Lack of compliance are not complicating management of the primary condition.  Number of separate conditions addressed during today's visit: 3 (mood fair control, anxiety fair control, sleep inadequate control).  Are medication adjustments being made today: Yes.  Are referral(s) being ordered today: No.  Complexity (level) of medical decision making employed in the encounter: MODERATE.    General Impression:    ICD-10-CM ICD-9-CM   1. Moderate episode of recurrent major depressive disorder  F33.1 296.32   2. FLORENCE (generalized anxiety disorder)  F41.1 300.02   3. PTSD (post-traumatic stress disorder)  F43.10 309.81   4. Insomnia, unspecified type  G47.00 780.52     Intervention/Counseling/Treatment Plan   Stop trazodone due to lack of benefit  Start restoril 15mg nightly prn insomnia, Discussed potential SE including but not limited to addictive behavior, cognitive clouding, sedation, falls, memory impairment; discussed risks of life-threatening oversedation if taken with sedating substances (including alcohol, prescription medications, recreational substances, etc.)  Continue trileptal 150mg bid  Continue lamotrigine 150mg bid, defer management to neurology  Continue trintellix 20mg daily  CBC and CMP unremarkable, TSH wnl, UDS +cannabis and +benzo  No need for PEC as pt is not an imminent danger to self or others or gravely disabled due to acute psychiatric illness  Discussed that pt should either call clinic for psychiatric crisis symptoms or present to nearest emergency room    Discussed with patient informed consent including diagnosis, risks and benefits of proposed  treatment above vs. alternative treatments vs. no treatment, as well as serious and common side effects of these treatments, and the inherent unpredictability of individual responses to these treatments. The patient expresses understanding of the above and displays the capacity to agree with this current plan. Patient also agrees that, currently, the benefits outweigh the risks and would like to pursue treatment at this time, and had no other questions.    Instructions:  Take all medications as prescribed.    Abstain from recreational drugs and alcohol.  Present to ED or call 911 for SI/HI plan or intent, psychosis, or medical emergency.    Return to Clinic: Follow up in about 3 months (around 12/6/2023).    Total time: Total time spent with patient 30 minutes.     Damian Ward MD  [facility]

## 2023-09-14 ENCOUNTER — OFFICE VISIT (OUTPATIENT)
Dept: FAMILY MEDICINE | Facility: CLINIC | Age: 28
End: 2023-09-14
Payer: MEDICAID

## 2023-09-14 VITALS
TEMPERATURE: 98 F | HEIGHT: 66 IN | RESPIRATION RATE: 20 BRPM | BODY MASS INDEX: 35.87 KG/M2 | OXYGEN SATURATION: 97 % | HEART RATE: 84 BPM | DIASTOLIC BLOOD PRESSURE: 83 MMHG | WEIGHT: 223.19 LBS | SYSTOLIC BLOOD PRESSURE: 121 MMHG

## 2023-09-14 DIAGNOSIS — R39.9 URINARY SYMPTOM OR SIGN: Primary | ICD-10-CM

## 2023-09-14 LAB
APPEARANCE UR: CLEAR
BACTERIA #/AREA URNS AUTO: ABNORMAL /HPF
BILIRUB UR QL STRIP.AUTO: NEGATIVE
COLOR UR: ABNORMAL
GLUCOSE UR QL STRIP.AUTO: NORMAL
HYALINE CASTS #/AREA URNS LPF: ABNORMAL /LPF
KETONES UR QL STRIP.AUTO: NEGATIVE
LEUKOCYTE ESTERASE UR QL STRIP.AUTO: 25
NITRITE UR QL STRIP.AUTO: NEGATIVE
PH UR STRIP.AUTO: 6.5 [PH]
PROT UR QL STRIP.AUTO: NEGATIVE
RBC #/AREA URNS AUTO: ABNORMAL /HPF
RBC UR QL AUTO: NEGATIVE
SP GR UR STRIP.AUTO: 1.01 (ref 1–1.03)
SQUAMOUS #/AREA URNS LPF: ABNORMAL /HPF
UROBILINOGEN UR STRIP-ACNC: NORMAL
WBC #/AREA URNS AUTO: ABNORMAL /HPF

## 2023-09-14 PROCEDURE — 1160F RVW MEDS BY RX/DR IN RCRD: CPT | Mod: CPTII,,,

## 2023-09-14 PROCEDURE — 99213 PR OFFICE/OUTPT VISIT, EST, LEVL III, 20-29 MIN: ICD-10-PCS | Mod: S$PBB,,,

## 2023-09-14 PROCEDURE — 1159F MED LIST DOCD IN RCRD: CPT | Mod: CPTII,,,

## 2023-09-14 PROCEDURE — 3008F PR BODY MASS INDEX (BMI) DOCUMENTED: ICD-10-PCS | Mod: CPTII,,,

## 2023-09-14 PROCEDURE — 3079F PR MOST RECENT DIASTOLIC BLOOD PRESSURE 80-89 MM HG: ICD-10-PCS | Mod: CPTII,,,

## 2023-09-14 PROCEDURE — 3044F HG A1C LEVEL LT 7.0%: CPT | Mod: CPTII,,,

## 2023-09-14 PROCEDURE — 3044F PR MOST RECENT HEMOGLOBIN A1C LEVEL <7.0%: ICD-10-PCS | Mod: CPTII,,,

## 2023-09-14 PROCEDURE — 1160F PR REVIEW ALL MEDS BY PRESCRIBER/CLIN PHARMACIST DOCUMENTED: ICD-10-PCS | Mod: CPTII,,,

## 2023-09-14 PROCEDURE — 3079F DIAST BP 80-89 MM HG: CPT | Mod: CPTII,,,

## 2023-09-14 PROCEDURE — 99213 OFFICE O/P EST LOW 20 MIN: CPT | Mod: S$PBB,,,

## 2023-09-14 PROCEDURE — 99214 OFFICE O/P EST MOD 30 MIN: CPT | Mod: PBBFAC,PN

## 2023-09-14 PROCEDURE — 81001 URINALYSIS AUTO W/SCOPE: CPT

## 2023-09-14 PROCEDURE — 3008F BODY MASS INDEX DOCD: CPT | Mod: CPTII,,,

## 2023-09-14 PROCEDURE — 3074F SYST BP LT 130 MM HG: CPT | Mod: CPTII,,,

## 2023-09-14 PROCEDURE — 1159F PR MEDICATION LIST DOCUMENTED IN MEDICAL RECORD: ICD-10-PCS | Mod: CPTII,,,

## 2023-09-14 PROCEDURE — 3074F PR MOST RECENT SYSTOLIC BLOOD PRESSURE < 130 MM HG: ICD-10-PCS | Mod: CPTII,,,

## 2023-09-14 RX ORDER — TRAZODONE HYDROCHLORIDE 100 MG/1
100-200 TABLET ORAL NIGHTLY PRN
COMMUNITY
Start: 2023-09-06 | End: 2023-11-03 | Stop reason: SDUPTHER

## 2023-09-14 NOTE — PROGRESS NOTES
Patient Name: Salma Montero     : 1995    MRN: [id number]     Subjective:     Patient ID: Salma Montero is a 28 y.o. female.    Chief Complaint:   Chief Complaint   Patient presents with    Follow-up     Patient request. States feeling drained and weak. States unsure if she has a UTI again.          HPI: 2023: Salma Montero is a 28 y.o. female who complains of hesitancy, incomplete bladder emptying, and suprapubic pressure for several days. Patient denies back pain, congestion, cough, fever, headache, rhinitis, sorethroat, stomach ache, and vaginal discharge.  Patient does have a history of recurrent UTI.  Patient does not have a history of pyelonephritis. Patient denies chest pain, palpitations, and shortness of breath.  Patient denies fever, night sweats, chills, nausea, vomiting, diarrhea, constipation, weight loss, and changes in appetite.            ROS:       12 point review of systems conducted, negative except as stated in the history of present illness. See HPI for details.    History:     Past Medical History:   Diagnosis Date    Dizziness     GERD (gastroesophageal reflux disease)     Headache     Obesity     Palpitations     PTSD (post-traumatic stress disorder)         Past Surgical History:   Procedure Laterality Date    TONSILLECTOMY AND ADENOIDECTOMY      UPPER GASTROINTESTINAL ENDOSCOPY         Family History   Problem Relation Age of Onset    Heart failure Father     Bipolar disorder Sister     Crohn's disease Sister     Cancer Maternal Grandmother     Anxiety disorder Maternal Grandmother     Alzheimer's disease Maternal Grandmother     Heart failure Maternal Grandfather     Diabetes Maternal Grandfather     Skin cancer Maternal Grandfather     Bipolar disorder Cousin     Bipolar disorder Cousin         Social History     Tobacco Use    Smoking status: Former     Types: Vaping with nicotine    Smokeless tobacco: Never    Tobacco comments:      "States vapes occasionally    Substance and Sexual Activity    Alcohol use: Yes     Alcohol/week: 2.0 standard drinks of alcohol     Types: 2 Shots of liquor per week     Comment: couple times a month    Drug use: Yes     Frequency: 21.0 times per week     Types: Marijuana    Sexual activity: Yes     Partners: Male     Birth control/protection: OCP       Current Outpatient Medications   Medication Instructions    cetirizine (ALLER-VALE) 10 MG tablet 1 tablet, Oral, Daily    cholecalciferol (vitamin D3) (VITAMIN D3) 50 mcg, Oral, Daily    famotidine-calcium carbonate-magnesium hydroxide (PEPCID COMPLETE) -165 mg 1 tablet, Oral, Daily PRN    folic acid (FOLVITE) 1 mg, Oral, Daily    glycopyrrolate (ROBINUL) 1 mg, Oral, Daily    inulin-chromium picolinate (FIBER SELECT GUMMIES) 2-100 gram-mcg Chew 2 tablets, Oral, Daily    lamoTRIgine (LAMICTAL) 150 mg, Oral, 2 times daily    loratadine (CLARITIN) 10 mg, Oral, Daily    metoprolol succinate (TOPROL-XL) 50 mg, Oral, Nightly    OXcarbazepine (TRILEPTAL) 150 mg, Oral, 2 times daily    pantoprazole (PROTONIX) 40 mg, Oral, Daily    temazepam (RESTORIL) 15 mg, Oral, Nightly PRN    traZODone (DESYREL) 100-200 mg, Oral, Nightly PRN    vortioxetine (TRINTELLIX) 20 mg, Oral, Daily        Review of patient's allergies indicates:   Allergen Reactions    Corn meal-luffa cylindrica frt Anaphylaxis, Hives, Itching and Rash    Orange Hives and Itching    Sulfa (sulfonamide antibiotics) Rash, Hives and Itching       Objective:     Visit Vitals  /83 (BP Location: Left arm, Patient Position: Sitting)   Pulse 84   Temp 97.9 °F (36.6 °C) (Oral)   Resp 20   Ht 5' 6" (1.676 m)   Wt 101.2 kg (223 lb 3.2 oz)   LMP 08/22/2023 (Exact Date)   SpO2 97%   BMI 36.03 kg/m²       Physical Examination:     Physical Exam  Constitutional:       General: She is not in acute distress.     Appearance: Normal appearance. She is not ill-appearing.   Cardiovascular:      Rate and " Rhythm: Normal rate and regular rhythm.      Heart sounds: Normal heart sounds.   Pulmonary:      Effort: Pulmonary effort is normal. No respiratory distress.      Breath sounds: Normal breath sounds.   Musculoskeletal:      Cervical back: Normal range of motion.   Skin:     General: Skin is warm and dry.   Neurological:      Mental Status: She is alert and oriented to person, place, and time.   Psychiatric:         Mood and Affect: Mood normal.         Behavior: Behavior normal.       Lab Results:     Chemistry:  Lab Results   Component Value Date     08/14/2023    K 4.3 08/14/2023    CHLORIDE 101 08/14/2023    BUN 11.1 08/14/2023    CREATININE 0.86 08/14/2023    EGFRNORACEVR >60 08/14/2023    GLUCOSE 103 (H) 08/14/2023    CALCIUM 9.1 08/14/2023    ALKPHOS 147 08/14/2023    LABPROT 7.6 08/14/2023    ALBUMIN 3.6 08/14/2023    BILIDIR 0.1 10/20/2021    IBILI 0.10 10/20/2021    AST 24 08/14/2023    ALT 48 08/14/2023    MG 1.80 08/09/2023    [id number] 58.2 08/14/2023    TSH 2.145 08/14/2023    KHESXX3QMRF 1.33 08/14/2023        Lab Results   Component Value Date    HGBA1C 5.0 08/14/2023        Hematology:  Lab Results   Component Value Date    WBC 9.59 08/14/2023    HGB 13.8 08/14/2023    HCT 40.2 08/14/2023     08/14/2023       Lipid Panel:  Lab Results   Component Value Date    CHOL 159 08/14/2023    HDL 26 (L) 08/14/2023    LDL 90.00 08/14/2023    TRIG 215 (H) 08/14/2023    TOTALCHOLEST 6 (H) 08/14/2023        Urine:  Lab Results   Component Value Date    COLORUA Light-Yellow 09/14/2023    APPEARANCEUA Clear 09/14/2023    SGUA 1.010 09/14/2023    PHUA 6.5 09/14/2023    PROTEINUA Negative 09/14/2023    GLUCOSEUA Normal 09/14/2023    KETONESUA Negative 09/14/2023    BLOODUA Negative 09/14/2023    NITRITESUA Negative 09/14/2023    LEUKOCYTESUR 25 (A) 09/14/2023    RBCUA 0-5 09/14/2023    WBCUA 0-5 09/14/2023    BACTERIA None Seen 09/14/2023    SQEPUA Occ (A) 09/14/2023    HYALINECASTS None Seen  09/14/2023        Assessment:          ICD-10-CM ICD-9-CM   1. Urinary symptom or sign  R39.9 788.99        Plan:     1. Urinary symptom or sign  -     Urinalysis, Reflex to Urine Culture       1. Medications: will notify her if culture shows need to start or change antibiotics  2. Maintain adequate hydration  3. Follow up if symptoms not improving, and prn.      Future Appointments   Date Time Provider Department Center   9/26/2023  1:30 PM Rico Hay MD [facility]   11/14/2023  9:15 AM Desiree Kiser NP [facility]   12/6/2023 11:30 AM Damian Ward MD [facility]   12/19/2023 10:45 AM Daly Colon MD [facility] NEUR[facility] [place] [facility]   1/22/2024  9:45 AM Idalia Alegria PA-C [facility] CARD [facility]        Desiree Kiser NP    I spent a total of 20 minutes on the day of the visit.This includes face to face time and non-face to face time preparing to see the patient (eg, review of tests), obtaining and/or reviewing separately obtained history, documenting clinical information in the electronic or other health record, independently interpreting results and communicating results to the patient/family/caregiver, or care coordinator.

## 2023-09-26 PROBLEM — N92.0 MENORRHAGIA WITH REGULAR CYCLE: Status: ACTIVE | Noted: 2023-09-26

## 2023-09-26 PROBLEM — N94.6 DYSMENORRHEA: Status: ACTIVE | Noted: 2023-09-26

## 2023-10-30 ENCOUNTER — TELEPHONE (OUTPATIENT)
Dept: FAMILY MEDICINE | Facility: CLINIC | Age: 28
End: 2023-10-30
Payer: MEDICAID

## 2023-10-30 NOTE — TELEPHONE ENCOUNTER
Called and spoke with patient. Verified . States found out this weekend she is pregnant. States has reached out and awaiting phone call back from Dr. Hay. Patient instructed to postpone any further HIDA scan. Patient instructed to reach out to Dr. Ward regarding Rx medications while pregnant. Verbalized understanding.

## 2023-10-30 NOTE — TELEPHONE ENCOUNTER
----- Message from Tere Tena sent at 10/30/2023  1:02 PM CDT -----  Regarding: Hidda scan sparkle  Pt called stating that she is scheduled to complete a hida scan, she found out that she is pregnant this weekend. Can she still have this testing done?

## 2023-10-31 ENCOUNTER — LAB VISIT (OUTPATIENT)
Dept: LAB | Facility: HOSPITAL | Age: 28
End: 2023-10-31
Attending: STUDENT IN AN ORGANIZED HEALTH CARE EDUCATION/TRAINING PROGRAM
Payer: MEDICAID

## 2023-10-31 ENCOUNTER — PATIENT MESSAGE (OUTPATIENT)
Dept: BEHAVIORAL HEALTH | Facility: CLINIC | Age: 28
End: 2023-10-31
Payer: MEDICAID

## 2023-10-31 DIAGNOSIS — Z32.00 PREGNANCY EXAMINATION OR TEST, PREGNANCY UNCONFIRMED: Primary | ICD-10-CM

## 2023-10-31 LAB — B-HCG FREE SERPL-ACNC: ABNORMAL MIU/ML

## 2023-10-31 PROCEDURE — 36415 COLL VENOUS BLD VENIPUNCTURE: CPT

## 2023-10-31 PROCEDURE — 84702 CHORIONIC GONADOTROPIN TEST: CPT

## 2023-11-02 ENCOUNTER — HOSPITAL ENCOUNTER (EMERGENCY)
Facility: HOSPITAL | Age: 28
Discharge: HOME | End: 2023-11-02
Attending: EMERGENCY MEDICINE
Payer: MEDICAID

## 2023-11-02 VITALS
RESPIRATION RATE: 18 BRPM | TEMPERATURE: 98 F | SYSTOLIC BLOOD PRESSURE: 128 MMHG | HEART RATE: 91 BPM | DIASTOLIC BLOOD PRESSURE: 79 MMHG | WEIGHT: 202.81 LBS | OXYGEN SATURATION: 100 % | BODY MASS INDEX: 32.75 KG/M2

## 2023-11-02 DIAGNOSIS — O21.9 NAUSEA AND VOMITING IN PREGNANCY: Primary | ICD-10-CM

## 2023-11-02 LAB
ALBUMIN SERPL-MCNC: 4.3 G/DL (ref 3.5–5)
ALBUMIN/GLOB SERPL: 1.3 RATIO (ref 1.1–2)
ALP SERPL-CCNC: 89 UNIT/L (ref 40–150)
ALT SERPL-CCNC: 14 UNIT/L (ref 0–55)
APPEARANCE UR: ABNORMAL
AST SERPL-CCNC: 19 UNIT/L (ref 5–34)
B-HCG FREE SERPL-ACNC: ABNORMAL MIU/ML
B-HCG UR QL: POSITIVE
BACTERIA #/AREA URNS AUTO: ABNORMAL /HPF
BASOPHILS # BLD AUTO: 0.08 X10(3)/MCL
BASOPHILS NFR BLD AUTO: 0.8 %
BILIRUB SERPL-MCNC: 0.7 MG/DL
BILIRUB UR QL STRIP.AUTO: NEGATIVE
BUN SERPL-MCNC: 6.9 MG/DL (ref 7–18.7)
CALCIUM SERPL-MCNC: 9.9 MG/DL (ref 8.4–10.2)
CHLORIDE SERPL-SCNC: 105 MMOL/L (ref 98–107)
CO2 SERPL-SCNC: 22 MMOL/L (ref 22–29)
COLOR UR AUTO: YELLOW
CREAT SERPL-MCNC: 0.84 MG/DL (ref 0.55–1.02)
CTP QC/QA: YES
EOSINOPHIL # BLD AUTO: 0.06 X10(3)/MCL (ref 0–0.9)
EOSINOPHIL NFR BLD AUTO: 0.6 %
ERYTHROCYTE [DISTWIDTH] IN BLOOD BY AUTOMATED COUNT: 12.5 % (ref 11.5–17)
GFR SERPLBLD CREATININE-BSD FMLA CKD-EPI: >60 MLS/MIN/1.73/M2
GLOBULIN SER-MCNC: 3.3 GM/DL (ref 2.4–3.5)
GLUCOSE SERPL-MCNC: 103 MG/DL (ref 74–100)
GLUCOSE UR QL STRIP.AUTO: NEGATIVE
HCT VFR BLD AUTO: 40.7 % (ref 37–47)
HGB BLD-MCNC: 13.8 G/DL (ref 12–16)
HYALINE CASTS #/AREA URNS LPF: ABNORMAL /LPF
IMM GRANULOCYTES # BLD AUTO: 0.03 X10(3)/MCL (ref 0–0.04)
IMM GRANULOCYTES NFR BLD AUTO: 0.3 %
KETONES UR QL STRIP.AUTO: ABNORMAL
LEUKOCYTE ESTERASE UR QL STRIP.AUTO: 75
LYMPHOCYTES # BLD AUTO: 2.03 X10(3)/MCL (ref 0.6–4.6)
LYMPHOCYTES NFR BLD AUTO: 19.9 %
MCH RBC QN AUTO: 27.9 PG (ref 27–31)
MCHC RBC AUTO-ENTMCNC: 33.9 G/DL (ref 33–36)
MCV RBC AUTO: 82.2 FL (ref 80–94)
MONOCYTES # BLD AUTO: 0.7 X10(3)/MCL (ref 0.1–1.3)
MONOCYTES NFR BLD AUTO: 6.9 %
MUCOUS THREADS URNS QL MICRO: ABNORMAL /LPF
NEUTROPHILS # BLD AUTO: 7.28 X10(3)/MCL (ref 2.1–9.2)
NEUTROPHILS NFR BLD AUTO: 71.5 %
NITRITE UR QL STRIP.AUTO: NEGATIVE
NRBC BLD AUTO-RTO: 0 %
PH UR STRIP.AUTO: 6 [PH]
PLATELET # BLD AUTO: 316 X10(3)/MCL (ref 130–400)
PMV BLD AUTO: 9.7 FL (ref 7.4–10.4)
POTASSIUM SERPL-SCNC: 3.6 MMOL/L (ref 3.5–5.1)
PROT SERPL-MCNC: 7.6 GM/DL (ref 6.4–8.3)
PROT UR QL STRIP.AUTO: ABNORMAL
RBC # BLD AUTO: 4.95 X10(6)/MCL (ref 4.2–5.4)
RBC #/AREA URNS AUTO: ABNORMAL /HPF
RBC UR QL AUTO: NEGATIVE
SODIUM SERPL-SCNC: 140 MMOL/L (ref 136–145)
SP GR UR STRIP.AUTO: 1.02 (ref 1–1.03)
UROBILINOGEN UR STRIP-ACNC: ABNORMAL
WBC # SPEC AUTO: 10.18 X10(3)/MCL (ref 4.5–11.5)
WBC #/AREA URNS AUTO: ABNORMAL /HPF

## 2023-11-02 PROCEDURE — 84702 CHORIONIC GONADOTROPIN TEST: CPT | Performed by: EMERGENCY MEDICINE

## 2023-11-02 PROCEDURE — 99284 EMERGENCY DEPT VISIT MOD MDM: CPT | Mod: 25

## 2023-11-02 PROCEDURE — 96374 THER/PROPH/DIAG INJ IV PUSH: CPT

## 2023-11-02 PROCEDURE — 81025 URINE PREGNANCY TEST: CPT | Performed by: NURSE PRACTITIONER

## 2023-11-02 PROCEDURE — 85025 COMPLETE CBC W/AUTO DIFF WBC: CPT | Performed by: NURSE PRACTITIONER

## 2023-11-02 PROCEDURE — 81001 URINALYSIS AUTO W/SCOPE: CPT | Performed by: NURSE PRACTITIONER

## 2023-11-02 PROCEDURE — 63600175 PHARM REV CODE 636 W HCPCS: Performed by: NURSE PRACTITIONER

## 2023-11-02 PROCEDURE — 96361 HYDRATE IV INFUSION ADD-ON: CPT

## 2023-11-02 PROCEDURE — 80053 COMPREHEN METABOLIC PANEL: CPT | Performed by: NURSE PRACTITIONER

## 2023-11-02 PROCEDURE — 63600175 PHARM REV CODE 636 W HCPCS: Performed by: EMERGENCY MEDICINE

## 2023-11-02 RX ORDER — ONDANSETRON 2 MG/ML
4 INJECTION INTRAMUSCULAR; INTRAVENOUS
Status: COMPLETED | OUTPATIENT
Start: 2023-11-02 | End: 2023-11-02

## 2023-11-02 RX ORDER — ONDANSETRON 4 MG/1
4 TABLET, ORALLY DISINTEGRATING ORAL EVERY 8 HOURS PRN
Qty: 14 TABLET | Refills: 0 | Status: SHIPPED | OUTPATIENT
Start: 2023-11-02 | End: 2023-11-10 | Stop reason: SDUPTHER

## 2023-11-02 RX ADMIN — SODIUM CHLORIDE, POTASSIUM CHLORIDE, SODIUM LACTATE AND CALCIUM CHLORIDE 1000 ML: 600; 310; 30; 20 INJECTION, SOLUTION INTRAVENOUS at 04:11

## 2023-11-02 RX ADMIN — ONDANSETRON 4 MG: 2 INJECTION INTRAMUSCULAR; INTRAVENOUS at 04:11

## 2023-11-02 NOTE — FIRST PROVIDER EVALUATION
Medical screening examination initiated.  I have conducted a focused provider triage encounter, findings are as follows:    Brief history of present illness:  Pt is a 28 y.o. female who presents to the [facility] ED complaining of nausea, vomiting. Pt currently pregnant.    Vitals:    11/02/23 1626   BP: 138/77   BP Location: Left arm   Patient Position: Sitting   Pulse: (!) 111   Resp: 16   Temp: 97.9 °F (36.6 °C)   TempSrc: Tympanic   SpO2: 100%   Weight: 92 kg (202 lb 13.2 oz)       Pertinent physical exam:      Brief workup plan:  Diagnostic evaluation    Preliminary workup initiated; this workup will be continued and followed by the physician or advanced practice provider that is assigned to the patient when roomed.

## 2023-11-02 NOTE — ED PROVIDER NOTES
ED PROVIDER NOTE  2023    CHIEF COMPLAINT:   Chief Complaint   Patient presents with    Hyperemesis Gravidarum     PT REPORTS 5 WK OB W NV X 1 WK.  STATES UNABLE TO HOLD DOWN LIQUIDS.  DENIES VAG BLEEDING, NO ABD CRAMPING REPORTED.        HISTORY OF PRESENT ILLNESS:   Salma Montero is a 28 y.o. female who presents with chief complaint Nausea and vomiting. Onset was about 5 days ago with persistent nausea and vomiting aggravated with attempts to eat or drink anything. States that she has been taking reglan but is not working. She reports she is  approximately 5w5d gestation based on LMP 23.     The history is provided by the patient.         REVIEW OF SYSTEMS: as noted in the HPI.  NURSING NOTES REVIEWED      PAST MEDICAL/SURGICAL HISTORY:   Past Medical History:   Diagnosis Date    Depression     Dizziness     Endometriosis of uterus     GERD (gastroesophageal reflux disease)     Headache     Obesity     Palpitations     PTSD (post-traumatic stress disorder)       Past Surgical History:   Procedure Laterality Date    TONSILLECTOMY AND ADENOIDECTOMY      UPPER GASTROINTESTINAL ENDOSCOPY         FAMILY HISTORY:   Family History   Problem Relation Age of Onset    Heart failure Father     Bipolar disorder Sister     Crohn's disease Sister     Cancer Maternal Grandmother     Anxiety disorder Maternal Grandmother     Alzheimer's disease Maternal Grandmother     Heart failure Maternal Grandfather     Diabetes Maternal Grandfather     Skin cancer Maternal Grandfather     Cancer Maternal Grandfather     Bipolar disorder Cousin     Bipolar disorder Cousin        SOCIAL HISTORY:   Social History     Tobacco Use    Smoking status: Former     Types: Vaping with nicotine    Smokeless tobacco: Never    Tobacco comments:     States vapes occasionally    Substance Use Topics    Alcohol use: Yes     Alcohol/week: 2.0 standard drinks of alcohol     Types: 2 Shots of liquor per week     Comment: couple times a month     Drug use: Not Currently     Frequency: 21.0 times per week     Types: Marijuana       ALLERGIES:   Review of patient's allergies indicates:   Allergen Reactions    Corn meal-luffa cylindrica frt Anaphylaxis, Hives, Itching and Rash    Orange Hives and Itching    Sulfa (sulfonamide antibiotics) Rash, Hives and Itching       PHYSICAL EXAM:  Initial Vitals [11/02/23 1626]   BP Pulse Resp Temp SpO2   138/77 (!) 111 16 97.9 °F (36.6 °C) 100 %      MAP       --         Physical Exam    Nursing note and vitals reviewed.  Constitutional: She appears well-developed and well-nourished.   HENT:   Head: Normocephalic and atraumatic.   Mouth/Throat: Uvula is midline and mucous membranes are normal.   Eyes: EOM are normal. Pupils are equal, round, and reactive to light.   Neck: Trachea normal. Neck supple.   Cardiovascular:  Regular rhythm, intact distal pulses and normal pulses.   Tachycardia present.         Pulmonary/Chest: Effort normal and breath sounds normal.   Abdominal: Abdomen is soft. Bowel sounds are normal. There is no rebound and no guarding.   Musculoskeletal:         General: Normal range of motion.      Cervical back: Neck supple.     Neurological: She is alert and oriented to person, place, and time. GCS eye subscore is 4. GCS verbal subscore is 5. GCS motor subscore is 6.   Skin: Skin is warm and dry.   Psychiatric: She has a normal mood and affect. Her speech is normal. Thought content normal.         RESULTS:  Labs Reviewed   COMPREHENSIVE METABOLIC PANEL - Abnormal; Notable for the following components:       Result Value    Glucose Level 103 (*)     Blood Urea Nitrogen 6.9 (*)     All other components within normal limits   URINALYSIS, REFLEX TO URINE CULTURE - Abnormal; Notable for the following components:    Appearance, UA Hazy (*)     Protein, UA 1+ (*)     Ketones, UA 3+ (*)     Urobilinogen, UA 1+ (*)     Leukocyte Esterase, UA 75 (*)     Mucous, UA Trace (*)     All other components within normal  limits    Narrative:     Microscopic performed on unspun urine due to small quantity   HCG, QUANTITATIVE - Abnormal; Notable for the following components:    Beta Human Chorionic Gonadotropin Quantitative 29,610.46 (*)     All other components within normal limits   POCT URINE PREGNANCY - Abnormal; Notable for the following components:    POC Preg Test, Ur Positive (*)     All other components within normal limits   CBC W/ AUTO DIFFERENTIAL    Narrative:     The following orders were created for panel order CBC auto differential.  Procedure                               Abnormality         Status                     ---------                               -----------         ------                     CBC with Differential[[id number]]                           Final result                 Please view results for these tests on the individual orders.   CBC WITH DIFFERENTIAL   EXTRA TUBES    Narrative:     The following orders were created for panel order EXTRA TUBES.  Procedure                               Abnormality         Status                     ---------                               -----------         ------                     Light Blue Top Hold[[id number]]                             In process                 Gold Top Hold[[id number]]                                   In process                   Please view results for these tests on the individual orders.   LIGHT BLUE TOP HOLD   GOLD TOP HOLD     Imaging Results    None         PROCEDURES:  Procedures    ECG:       ED COURSE AND MEDICAL DECISION MAKING:  Medications   lactated ringers bolus 999 mL (0 mLs Intravenous Stopped 11/2/23 1738)   ondansetron injection 4 mg (4 mg Intravenous Given 11/2/23 1646)     ED Course as of 11/02/23 2355   Thu Nov 02, 2023   1709 WBC: 10.18 [IB]   1709 Hemoglobin: 13.8 [IB]   1709 Platelet Count: 316 [IB]   1709 Preg Test, Ur(!): Positive [IB]   1742 Sodium: 140 [IB]   1742 Potassium: 3.6 [IB]   1742 Glucose(!): 103  [IB]   1742 Creatinine: 0.84 [IB]   1742 Bacteria, UA: Rare [IB]   1742 Leukocytes, UA(!): 75 [IB]   1742 WBC, UA: 0-5 [IB]   1742 RBC, UA: 0-5 [IB]   1742 NITRITE UA: Negative [IB]   1803 Successful PO challenge. [IB]      ED Course User Index  [IB] Amanuel Mari, DO        Medical Decision Making  Well-appearing 28-year-old female  approximately 5 weeks 5 days' gestation based on LMP presents with persistent nausea and vomiting over the past 5 days not relieved by Reglan.  Denies any abdominal or pelvic pain or vaginal bleeding.  CBC and CMP grossly unremarkable.  UA shows no pyuria with rare bacteria, not convincing for bacteriuria to warrant empiric antibiotic treatment.  She was given Zofran with successful p.o. challenge along with IV LR and reports feeling much better.  We will discharge with Zofran and instructed to follow up with her OBGYN.  Given strict ED return precautions. I have spoken with the patient and/or caregivers. I have explained the patient's condition, diagnoses and treatment plan based on the information available to me at this time. I have answered the patient's and/or caregiver's questions and addressed any concerns. The patient and/or caregivers have as good an understanding of the patient's diagnosis, condition and treatment plan as can be expected at this point. The vital signs have been stable. The patient's condition is stable and appropriate for discharge from the emergency department.     The patient will pursue further outpatient evaluation with the primary care physician or other designated or consulting physician as outlined in the discharge instructions. The patient and/or caregivers are agreeable to this plan of care and follow-up instructions have been explained in detail. The patient and/or caregivers have received these instructions in written format and have expressed an understanding of the discharge instructions. The patient and/or caregivers are aware that any  significant change in condition or worsening of symptoms should prompt an immediate return to this or the closest emergency department or a call to 911.    Amount and/or Complexity of Data Reviewed  External Data Reviewed: labs and notes.  Labs: ordered. Decision-making details documented in ED Course.    Risk  OTC drugs.  Prescription drug management.        CLINICAL IMPRESSION:  1. Nausea and vomiting in pregnancy        DISPOSITION:   ED Disposition Condition    Discharge Stable            ED Prescriptions       Medication Sig Dispense Start Date End Date Auth. Provider    ondansetron (ZOFRAN-ODT) 4 MG TbDL Take 1 tablet (4 mg total) by mouth every 8 (eight) hours as needed (nausea and vomiting). 14 tablet 11/2/2023 -- Amanuel Mari DO          Follow-up Information       Follow up With Specialties Details Why Contact Info    Desiree Kiser NP Family Medicine Schedule an appointment as soon as possible for a visit   [facility] [facility] [address] [phone]      [facility] Emergency Dept Emergency Medicine  If symptoms worsen [address] [facility] [address] [phone]               Amanuel Mari DO  11/02/23 8503

## 2023-11-02 NOTE — Clinical Note
"Salma Vigil" Kylah was seen and treated in our emergency department on 11/2/2023.  She may return to work on 11/04/2023.       If you have any questions or concerns, please don't hesitate to call.       LPN    "

## 2023-11-03 ENCOUNTER — OFFICE VISIT (OUTPATIENT)
Dept: BEHAVIORAL HEALTH | Facility: CLINIC | Age: 28
End: 2023-11-03
Payer: MEDICAID

## 2023-11-03 VITALS
HEART RATE: 81 BPM | WEIGHT: 210.63 LBS | SYSTOLIC BLOOD PRESSURE: 121 MMHG | OXYGEN SATURATION: 100 % | TEMPERATURE: 98 F | DIASTOLIC BLOOD PRESSURE: 85 MMHG | BODY MASS INDEX: 34.01 KG/M2

## 2023-11-03 DIAGNOSIS — G47.00 INSOMNIA, UNSPECIFIED TYPE: Primary | ICD-10-CM

## 2023-11-03 DIAGNOSIS — G43.709 CHRONIC MIGRAINE WITHOUT AURA WITHOUT STATUS MIGRAINOSUS, NOT INTRACTABLE: ICD-10-CM

## 2023-11-03 DIAGNOSIS — F33.1 MODERATE EPISODE OF RECURRENT MAJOR DEPRESSIVE DISORDER: Chronic | ICD-10-CM

## 2023-11-03 DIAGNOSIS — F41.1 GAD (GENERALIZED ANXIETY DISORDER): Chronic | ICD-10-CM

## 2023-11-03 DIAGNOSIS — F43.10 PTSD (POST-TRAUMATIC STRESS DISORDER): Chronic | ICD-10-CM

## 2023-11-03 PROCEDURE — 3074F PR MOST RECENT SYSTOLIC BLOOD PRESSURE < 130 MM HG: ICD-10-PCS | Mod: CPTII,,, | Performed by: STUDENT IN AN ORGANIZED HEALTH CARE EDUCATION/TRAINING PROGRAM

## 2023-11-03 PROCEDURE — 3079F DIAST BP 80-89 MM HG: CPT | Mod: CPTII,,, | Performed by: STUDENT IN AN ORGANIZED HEALTH CARE EDUCATION/TRAINING PROGRAM

## 2023-11-03 PROCEDURE — 3008F PR BODY MASS INDEX (BMI) DOCUMENTED: ICD-10-PCS | Mod: CPTII,,, | Performed by: STUDENT IN AN ORGANIZED HEALTH CARE EDUCATION/TRAINING PROGRAM

## 2023-11-03 PROCEDURE — 3079F PR MOST RECENT DIASTOLIC BLOOD PRESSURE 80-89 MM HG: ICD-10-PCS | Mod: CPTII,,, | Performed by: STUDENT IN AN ORGANIZED HEALTH CARE EDUCATION/TRAINING PROGRAM

## 2023-11-03 PROCEDURE — 1159F MED LIST DOCD IN RCRD: CPT | Mod: CPTII,,, | Performed by: STUDENT IN AN ORGANIZED HEALTH CARE EDUCATION/TRAINING PROGRAM

## 2023-11-03 PROCEDURE — 1159F PR MEDICATION LIST DOCUMENTED IN MEDICAL RECORD: ICD-10-PCS | Mod: CPTII,,, | Performed by: STUDENT IN AN ORGANIZED HEALTH CARE EDUCATION/TRAINING PROGRAM

## 2023-11-03 PROCEDURE — 99214 OFFICE O/P EST MOD 30 MIN: CPT | Mod: AF,HB,S$PBB, | Performed by: STUDENT IN AN ORGANIZED HEALTH CARE EDUCATION/TRAINING PROGRAM

## 2023-11-03 PROCEDURE — 1160F RVW MEDS BY RX/DR IN RCRD: CPT | Mod: CPTII,,, | Performed by: STUDENT IN AN ORGANIZED HEALTH CARE EDUCATION/TRAINING PROGRAM

## 2023-11-03 PROCEDURE — 99213 OFFICE O/P EST LOW 20 MIN: CPT | Mod: PBBFAC,PN | Performed by: STUDENT IN AN ORGANIZED HEALTH CARE EDUCATION/TRAINING PROGRAM

## 2023-11-03 PROCEDURE — 3074F SYST BP LT 130 MM HG: CPT | Mod: CPTII,,, | Performed by: STUDENT IN AN ORGANIZED HEALTH CARE EDUCATION/TRAINING PROGRAM

## 2023-11-03 PROCEDURE — 3008F BODY MASS INDEX DOCD: CPT | Mod: CPTII,,, | Performed by: STUDENT IN AN ORGANIZED HEALTH CARE EDUCATION/TRAINING PROGRAM

## 2023-11-03 PROCEDURE — 99214 PR OFFICE/OUTPT VISIT, EST, LEVL IV, 30-39 MIN: ICD-10-PCS | Mod: AF,HB,S$PBB, | Performed by: STUDENT IN AN ORGANIZED HEALTH CARE EDUCATION/TRAINING PROGRAM

## 2023-11-03 PROCEDURE — 1160F PR REVIEW ALL MEDS BY PRESCRIBER/CLIN PHARMACIST DOCUMENTED: ICD-10-PCS | Mod: CPTII,,, | Performed by: STUDENT IN AN ORGANIZED HEALTH CARE EDUCATION/TRAINING PROGRAM

## 2023-11-03 PROCEDURE — 3044F PR MOST RECENT HEMOGLOBIN A1C LEVEL <7.0%: ICD-10-PCS | Mod: CPTII,,, | Performed by: STUDENT IN AN ORGANIZED HEALTH CARE EDUCATION/TRAINING PROGRAM

## 2023-11-03 PROCEDURE — 3044F HG A1C LEVEL LT 7.0%: CPT | Mod: CPTII,,, | Performed by: STUDENT IN AN ORGANIZED HEALTH CARE EDUCATION/TRAINING PROGRAM

## 2023-11-03 RX ORDER — LAMOTRIGINE 150 MG/1
150 TABLET ORAL 2 TIMES DAILY
Qty: 60 TABLET | Refills: 5 | Status: SHIPPED | OUTPATIENT
Start: 2023-11-03 | End: 2023-12-19 | Stop reason: SDUPTHER

## 2023-11-03 RX ORDER — TRAZODONE HYDROCHLORIDE 100 MG/1
100-200 TABLET ORAL NIGHTLY PRN
Qty: 60 TABLET | Refills: 5 | Status: SHIPPED | OUTPATIENT
Start: 2023-11-03 | End: 2024-02-26 | Stop reason: SDUPTHER

## 2023-11-03 NOTE — PROGRESS NOTES
"Outpatient Psychiatry Follow-Up Visit    11/3/2023    Clinical Status of Patient:  Outpatient (Ambulatory)    Chief Complaint:  Salma Montero is a 28 y.o. female who presents today for follow-up of MDD, FLORENCE, PTSD, insomnia. Patient last seen for follow-up on 9/6/2023.  Met with patient.      Interval History and Content of Current Session:  Interim Events/Subjective Report/Content of Current Session:   Pt reports doing "good"  overall.  Pt reports that she is now 5 wks pregnant.  Happy about the pregnancy.  Says that she and her  have been trying for a while and both are very happy.  She is upset because her mother doesn't seem very happy with pt's pregnancy.  Pt expressed some concern about potential negative effects from her current psychotropic regimen.  Met with OBGYN who didn't have significant concerns.  Reports mood is "good," anxiety fair.  Sleeping well.  Appetite low due to nausea and vomiting.  Energy fair.  Denies hopelessness.  Denies SI/HI/AVH/paranoia, denies plan or desire for self harm or harm to others.  Denies SE from current regimen.  Overall, pt happy with current regimen but wants to simplify medications to reduce risk.     Review of Systems   PSYCHIATRIC: Pertinant items are noted in the narrative.  CONSTITUTIONAL: No weight gain or loss.  MUSCULOSKELETAL: No pain or stiffness of the joints.  NEUROLOGIC: No weakness, sensory changes, seizures, confusion, memory loss, tremor or other abnormal movements.    RESPIRATORY: No shortness of breath.  CARDIOVASCULAR: No tachycardia or chest pain.    GASTROINTESTINAL: + vomiting, +nausea, diarrhea.      Past Medical, Family and Social History: The patient's past medical, family and social history have been reviewed and updated as appropriate within the electronic medical record - see encounter notes.    Compliance: good    Side effects: denies    Risk Parameters:  Patient reports no suicidal ideation  Patient reports no homicidal " "ideation  Patient reports no self-injurious behavior  Patient reports no violent behavior    Exam (detailed: at least 9 elements; comprehensive: all 15 elements)   Constitutional  Vitals:  Most recent vital signs, dated less than 90 days prior to this appointment, were reviewed.     Vitals:    11/03/23 0843   BP: 121/85   Pulse: 81   Temp: 97.9 °F (36.6 °C)   SpO2: 100%   Weight: 95.5 kg (210 lb 9.6 oz)        General:   Constitutional: No acute distress, appears stated age, casually dressed    Neurologic:   Motor: moves all extremities spontaneously and without difficulty, no abnormal involuntary movements observed  Gait: normal gait and station    Mental status examination:   Appearance: appears stated age, casually dressed, no acute distress  Behavior: calm and cooperative  Mood: "good"  Affect: mood congruent, euthymic, brighter  Thought process: linear and goal directed  Associations: appropriate for conversation  Thought content: no plan or desire for self harm or harm to others, denies paranoia, no delusional ideation volunteered  Perceptions: denies hallucinations or other altered perceptions  Orientation: oriented to day of week, month, year, location and situation  Language: English, fluid  Attention: able to attend to interview  Insight: good  Judgement: good    PHQ9:  Over the last two weeks how often have you been bothered by little interest or pleasure in doing things: 1  Over the last two weeks how often have you been bothered by feeling down, depressed or hopeless: 1  PHQ-2 Total Score: 2  PHQ-4 Score: 7  PHQ-9 Score: 14  PHQ-9 Interpretation: Moderate        11/3/2023     8:42 AM 9/6/2023    11:09 AM 7/6/2023    11:26 AM   GAD7   1. Feeling nervous, anxious, or on edge? 3 3 1   2. Not being able to stop or control worrying? 3 3 3   3. Worrying too much about different things? 3 3 3   4. Trouble relaxing? 1 3 1   5. Being so restless that it is hard to sit still? 1 3 0   6. Becoming easily annoyed or " irritable? 3 3 3   7. Feeling afraid as if something awful might happen? 1 3 3   8. If you checked off any problems, how difficult have these problems made it for you to do your work, take care of things at home, or get along with other people? 2 3 1   FLORENCE-7 Score 15 21 14     Assessment and Diagnosis   Status/Progress: Based on the examination today, the patient's problem(s) is/are well controlled.  New problems have not been presented today.   Pregnancys is complicating management of the primary condition.  Number of separate conditions addressed during today's visit: 3 (mood well controlled, anxiety well controlled, insomnia well controlled).  Are medication adjustments being made today: Yes.  Are referral(s) being ordered today: No.  Complexity (level) of medical decision making employed in the encounter: HIGH.    General Impression:    ICD-10-CM ICD-9-CM   1. Insomnia, unspecified type  G47.00 780.52   2. Moderate episode of recurrent major depressive disorder  F33.1 296.32   3. FLORENCE (generalized anxiety disorder)  F41.1 300.02   4. PTSD (post-traumatic stress disorder)  F43.10 309.81   5. Chronic migraine without aura without status migrainosus, not intractable  G43.709 346.70     Intervention/Counseling/Treatment Plan   Continue trazodone 100-200mg nightly prn insomnia  Decrease trileptal to 150mg daily x7 days, then stop  Continue lamotrigine 150mg bid  Continue trintellix 20mg daily  Discussed risks of fetal abnormalities given current regimen, will discontinue trileptal to reduce risk further  Discussed potential for post-partum syndromes, discussed need for close follow-up during peripartum period  No need for PEC as pt is not an imminent danger to self or others or gravely disabled due to acute psychiatric illness  Discussed that pt should either call clinic for psychiatric crisis symptoms or present to nearest emergency room    Discussed with patient informed consent including diagnosis, risks and  benefits of proposed treatment above vs. alternative treatments vs. no treatment, as well as serious and common side effects of these treatments, and the inherent unpredictability of individual responses to these treatments. The patient expresses understanding of the above and displays the capacity to agree with this current plan. Patient also agrees that, currently, the benefits outweigh the risks and would like to pursue treatment at this time, and had no other questions.    Instructions:  Take all medications as prescribed.    Abstain from recreational drugs and alcohol.  Present to ED or call 911 for SI/HI plan or intent, psychosis, or medical emergency.    Return to Clinic: Follow up in about 2 months (around 1/3/2024).    Total time: Total time spent with patient 30 minutes.     Damian Ward MD  [facility]

## 2023-11-21 PROBLEM — G47.00 INSOMNIA: Status: RESOLVED | Noted: 2022-06-09 | Resolved: 2023-11-21

## 2023-11-21 PROBLEM — H65.92 LEFT OTITIS MEDIA WITH EFFUSION: Status: RESOLVED | Noted: 2022-12-14 | Resolved: 2023-11-21

## 2023-11-21 PROBLEM — F45.8 BRUXISM: Status: RESOLVED | Noted: 2022-12-13 | Resolved: 2023-11-21

## 2023-11-21 PROBLEM — R06.02 SOB (SHORTNESS OF BREATH): Status: RESOLVED | Noted: 2022-11-30 | Resolved: 2023-11-21

## 2023-11-21 PROBLEM — N94.6 DYSMENORRHEA: Status: RESOLVED | Noted: 2023-09-26 | Resolved: 2023-11-21

## 2023-11-21 PROBLEM — E55.9 VITAMIN D DEFICIENCY: Status: RESOLVED | Noted: 2022-06-09 | Resolved: 2023-11-21

## 2023-11-21 PROBLEM — O09.91 HIGH-RISK PREGNANCY IN FIRST TRIMESTER: Status: ACTIVE | Noted: 2023-11-21

## 2023-11-21 PROBLEM — R55 SYNCOPE: Status: RESOLVED | Noted: 2022-06-16 | Resolved: 2023-11-21

## 2023-11-21 PROBLEM — N92.0 MENORRHAGIA WITH REGULAR CYCLE: Status: RESOLVED | Noted: 2023-09-26 | Resolved: 2023-11-21

## 2023-11-21 PROBLEM — G44.229 CHRONIC TENSION-TYPE HEADACHE, NOT INTRACTABLE: Status: RESOLVED | Noted: 2022-12-13 | Resolved: 2023-11-21

## 2023-11-21 PROBLEM — G43.909 MIGRAINE HEADACHE: Status: RESOLVED | Noted: 2022-06-09 | Resolved: 2023-11-21

## 2023-11-21 PROBLEM — R00.0 TACHYCARDIA: Status: RESOLVED | Noted: 2022-12-13 | Resolved: 2023-11-21

## 2023-11-21 PROBLEM — R61 HYPERHIDROSIS: Status: RESOLVED | Noted: 2022-06-09 | Resolved: 2023-11-21

## 2023-11-21 PROBLEM — J30.9 ALLERGIC RHINITIS: Status: RESOLVED | Noted: 2022-06-16 | Resolved: 2023-11-21

## 2023-12-15 NOTE — PROGRESS NOTES
[facility] Neurology Follow Up Office Visit Note    Initial Visit Date: 3/8/2022  Last Visit Date: 8/16/2022  Current Visit Date:  12/19/2023    Chief Complaint:     Chief Complaint   Patient presents with    Migraine     States migraines are better; has about 2/month; About 13-14 weeks gestation       History of Present Illness:      This is 28 y.o. female with history of of Anxiety, Depression, Hyperhidrosis, Eating disorder, Palpitations, and Tachycardia, Migraine headache. Also planning on becoming pregnant in the near future.  During last visit, lamotrigine was increased to 150 mg twice a day with titration was started.  Oxcarbazepine was decreased to 150 mg twice a day. Patient has since found out that she is pregnant. Psychiatrist have titrated her off of Oxcarbazepine.     Interim History  Stable. No new complaints. Migraine headache has improved to 2-3 per month.      Presenting History   Of note, patient was started on Glycopyrrolate in 11/2021 while on Hydroxyzine, Prazosin, Trazodone, Klonopin, Effexor. She noted that around 12/2021, she had one episode whereby she felt dizzy, palpitation, lightheaded, followed by LOC. She noted that since then, she would experience out of body sensation, dizziness, and palpitations during activities. She also noted worsening headaches for the past few months. She reports chronic trouble staying asleep and waking up tired. She would go to bed at around 9 - 10 pm and wake up at around 8 AM in the morning. She also notes hyperhydrosis that has been consistently getting worse in recent years, requiring her to start glyocopyrrolate.      Medications:     Current Outpatient Medications   Medication Instructions    aspirin (ECOTRIN) 162 mg, Oral, Daily, Start at 12 weeks gestation.  At 36 weeks gestation, decrease to one 81mg tablet daily.    cetirizine (ALLER-VALE) 10 MG tablet 1 tablet, Oral, Daily    cholecalciferol (vitamin D3) (VITAMIN D3) 50 mcg, Oral, Daily     famotidine-calcium carbonate-magnesium hydroxide (PEPCID COMPLETE) -165 mg 1 tablet, Oral, Daily PRN    folic acid (FOLVITE) 1 mg, Oral, Daily    glycopyrrolate (ROBINUL) 1 mg, Oral, Daily    inulin-chromium picolinate (FIBER SELECT GUMMIES) 2-100 gram-mcg Chew 2 tablets, Oral, Daily    lamoTRIgine (LAMICTAL) 150 mg, Oral, 2 times daily    metoclopramide HCl (REGLAN) 10 mg, Oral, 3 times daily with meals    metoprolol succinate (TOPROL-XL) 50 mg, Oral, Nightly    ondansetron (ZOFRAN-ODT) 4 mg, Oral, Every 6 hours PRN    OXcarbazepine (TRILEPTAL) 150 mg, Oral, 2 times daily    pantoprazole (PROTONIX) 40 mg, Oral, Daily    promethazine (PHENERGAN) 25 mg, Rectal, Every 6 hours PRN    scopolamine (TRANSDERM-SCOP) 1.3-1.5 mg (1 mg over 3 days) 1 patch, Transdermal, Every 72 hours    traZODone (DESYREL) 100-200 mg, Oral, Nightly PRN    vortioxetine (TRINTELLIX) 20 mg, Oral, Daily       Labs:     Results for orders placed or performed in visit on 11/21/23   Urine Culture High Risk    Specimen: Urine, Clean Catch   Result Value Ref Range    Urine Culture No Significant Growth    T.vaginalisisc, Amplified RNA   Result Value Ref Range    SOURCE: urine     T.vaginalis, Misc, amplified RNA Negative Negative   C.trach/N.gonor AMP RNA   Result Value Ref Range    Chlamydia trachomatis amplified RNA Negative Negative    Neisseria gonorrhoeae amplified RNA Negative Negative    Source urine     SOURCE: urine    POCT Urinalysis, Dipstick, Automated, W/O Scope   Result Value Ref Range    POC Blood, Urine Negative Negative    POC Bilirubin, Urine Positive (A) Negative    POC Urobilinogen, Urine 0.2 0.1 - 1.1    POC Ketones, Urine Positive (A) Negative    POC Protein, Urine Positive (A) Negative    POC Nitrates, Urine Negative Negative    POC Glucose, Urine Negative Negative    pH, UA 6.0     POC Specific Gravity, Urine 1.030 (A) 1.003 - 1.029    POC Leukocytes, Urine Negative Negative       Studies:      - routine EEG 1/19/2022:  This is a normal routine awake EEG.  - Holter 24 hrs - 42% HR in 100s with premature beats.  - 10/20/2022 Tilt Table Test: negative.     Review of Systems:     All other systems reviewed and are negative.    Physical Exams:     Vitals:    12/19/23 1050   BP: 108/75   Pulse: 104   Resp: 14         Vitals and nursing note reviewed.   Constitutional:       Appearance: Normal appearance.   HENT:      Head: Normocephalic and atraumatic.      Nose: Nose normal.      Mouth/Throat:      Mouth: Mucous membranes are moist.      Pharynx: Oropharynx is clear.   Eyes:      Conjunctiva/sclera: Conjunctivae normal.   Cardiovascular:      Rate and Rhythm: Normal rate and regular rhythm.      Pulses: Normal pulses.   Pulmonary:      Effort: Pulmonary effort is normal.      Breath sounds: Normal breath sounds.   Abdominal:      General: Abdomen is flat.   Musculoskeletal:         General: Normal range of motion.      Cervical back: Normal range of motion.   Skin:     General: Skin is warm.   Neurological:      Mental Status: She is alert.         Comprehensive Neurological Exam:  Mental Status: Alert Oriented to Self, Date, and Place. Comprehension wnl. No dysarthria.   CN II - XII: EMMA, No APD, Fundus wnl OU, VA grossly intact to finger counting at 6 ft, VFFC, No ptosis OU, EOMI without nystagmus, LT/Temp symmetric in CN V1-3 distribution, Hearing grossly intact, Face Symmetric, Tongue and Uvula midline, Trapezius symmetric bilateral.   Motor: tone and bulk wnl throughout, no abnormal involuntary or voluntary movements, 5/5 to confrontation, Fine finger movements wnl b/l, No pronator drift.   Sensory: LT, Proprioception, Vibration, PP, Temp symmetric. No sensory simultagnosia.   Reflexes: 2+ throughout, plantar reflexes downward bilateral.   Cerebellar: FNF wnl b/l, RAHM wnl b/l  Romberg: Negative  Gait: normal.      Assessment:     This is 28 y.o. female with history of Anxiety, Depression, Hyperhidrosis, Eating disorder,  Palpitations, and Tachycardia, Migraine headache.     Problem List Items Addressed This Visit          Neuro    Chronic tension-type headache, not intractable - Primary       Psychiatric    Moderate episode of recurrent major depressive disorder (Chronic)     Other Visit Diagnoses       Migraine without aura and without status migrainosus, not intractable                    Plan:     [] continue with Lamotrigine 150 mg twice a day (300 mg in 24 hours)   [] continue with Folic acid 1 mg daily   [] start Sumatriptan 100 mg twice a day as needed for migraine     RTC 4 months      I have explained the treatment plan, diagnosis, and prognosis to patient. All questions are answered to the best of my knowledge.     Face to face time 40 minutes, including documentation, chart review, counseling, education, review of test results, relevant medical records, and coordination of care.   I have explained the treatment plan, diagnosis, and prognosis to patient. All questions are answered to the best of my knowledge.       Daly Colon MD   General Neurology  12/19/2023

## 2023-12-19 ENCOUNTER — OFFICE VISIT (OUTPATIENT)
Dept: NEUROLOGY | Facility: CLINIC | Age: 28
End: 2023-12-19
Payer: MEDICAID

## 2023-12-19 VITALS
SYSTOLIC BLOOD PRESSURE: 108 MMHG | RESPIRATION RATE: 14 BRPM | HEART RATE: 104 BPM | WEIGHT: 202.81 LBS | OXYGEN SATURATION: 97 % | HEIGHT: 66 IN | DIASTOLIC BLOOD PRESSURE: 75 MMHG | BODY MASS INDEX: 32.59 KG/M2

## 2023-12-19 DIAGNOSIS — G43.009 MIGRAINE WITHOUT AURA AND WITHOUT STATUS MIGRAINOSUS, NOT INTRACTABLE: ICD-10-CM

## 2023-12-19 DIAGNOSIS — F33.1 MODERATE EPISODE OF RECURRENT MAJOR DEPRESSIVE DISORDER: Primary | ICD-10-CM

## 2023-12-19 DIAGNOSIS — G44.229 CHRONIC TENSION-TYPE HEADACHE, NOT INTRACTABLE: ICD-10-CM

## 2023-12-19 PROCEDURE — 1159F PR MEDICATION LIST DOCUMENTED IN MEDICAL RECORD: ICD-10-PCS | Mod: CPTII,,, | Performed by: PSYCHIATRY & NEUROLOGY

## 2023-12-19 PROCEDURE — 99214 OFFICE O/P EST MOD 30 MIN: CPT | Mod: PBBFAC | Performed by: PSYCHIATRY & NEUROLOGY

## 2023-12-19 PROCEDURE — 99214 PR OFFICE/OUTPT VISIT, EST, LEVL IV, 30-39 MIN: ICD-10-PCS | Mod: S$PBB,,, | Performed by: PSYCHIATRY & NEUROLOGY

## 2023-12-19 PROCEDURE — 3044F PR MOST RECENT HEMOGLOBIN A1C LEVEL <7.0%: ICD-10-PCS | Mod: CPTII,,, | Performed by: PSYCHIATRY & NEUROLOGY

## 2023-12-19 PROCEDURE — 99214 OFFICE O/P EST MOD 30 MIN: CPT | Mod: S$PBB,,, | Performed by: PSYCHIATRY & NEUROLOGY

## 2023-12-19 PROCEDURE — 1159F MED LIST DOCD IN RCRD: CPT | Mod: CPTII,,, | Performed by: PSYCHIATRY & NEUROLOGY

## 2023-12-19 PROCEDURE — 3044F HG A1C LEVEL LT 7.0%: CPT | Mod: CPTII,,, | Performed by: PSYCHIATRY & NEUROLOGY

## 2023-12-19 PROCEDURE — 3078F PR MOST RECENT DIASTOLIC BLOOD PRESSURE < 80 MM HG: ICD-10-PCS | Mod: CPTII,,, | Performed by: PSYCHIATRY & NEUROLOGY

## 2023-12-19 PROCEDURE — 3074F SYST BP LT 130 MM HG: CPT | Mod: CPTII,,, | Performed by: PSYCHIATRY & NEUROLOGY

## 2023-12-19 PROCEDURE — 3078F DIAST BP <80 MM HG: CPT | Mod: CPTII,,, | Performed by: PSYCHIATRY & NEUROLOGY

## 2023-12-19 PROCEDURE — 3074F PR MOST RECENT SYSTOLIC BLOOD PRESSURE < 130 MM HG: ICD-10-PCS | Mod: CPTII,,, | Performed by: PSYCHIATRY & NEUROLOGY

## 2023-12-19 PROCEDURE — 3008F BODY MASS INDEX DOCD: CPT | Mod: CPTII,,, | Performed by: PSYCHIATRY & NEUROLOGY

## 2023-12-19 PROCEDURE — 3008F PR BODY MASS INDEX (BMI) DOCUMENTED: ICD-10-PCS | Mod: CPTII,,, | Performed by: PSYCHIATRY & NEUROLOGY

## 2023-12-19 RX ORDER — FOLIC ACID 1 MG/1
1 TABLET ORAL DAILY
Qty: 30 TABLET | Refills: 4 | Status: SHIPPED | OUTPATIENT
Start: 2023-12-19 | End: 2024-12-18

## 2023-12-19 RX ORDER — SUMATRIPTAN SUCCINATE 100 MG/1
100 TABLET ORAL
Qty: 10 TABLET | Refills: 4 | Status: SHIPPED | OUTPATIENT
Start: 2023-12-19 | End: 2024-01-18

## 2023-12-19 RX ORDER — LAMOTRIGINE 150 MG/1
150 TABLET ORAL 2 TIMES DAILY
Qty: 60 TABLET | Refills: 5 | Status: SHIPPED | OUTPATIENT
Start: 2023-12-19 | End: 2024-04-03 | Stop reason: SDUPTHER

## 2023-12-20 ENCOUNTER — TELEPHONE (OUTPATIENT)
Dept: NEUROLOGY | Facility: CLINIC | Age: 28
End: 2023-12-20
Payer: MEDICAID

## 2023-12-21 ENCOUNTER — OFFICE VISIT (OUTPATIENT)
Dept: BEHAVIORAL HEALTH | Facility: CLINIC | Age: 28
End: 2023-12-21
Payer: MEDICAID

## 2023-12-21 DIAGNOSIS — F33.1 MODERATE EPISODE OF RECURRENT MAJOR DEPRESSIVE DISORDER: Primary | Chronic | ICD-10-CM

## 2023-12-21 DIAGNOSIS — F41.1 GAD (GENERALIZED ANXIETY DISORDER): Chronic | ICD-10-CM

## 2023-12-21 PROCEDURE — 1160F PR REVIEW ALL MEDS BY PRESCRIBER/CLIN PHARMACIST DOCUMENTED: ICD-10-PCS | Mod: CPTII,95,, | Performed by: STUDENT IN AN ORGANIZED HEALTH CARE EDUCATION/TRAINING PROGRAM

## 2023-12-21 PROCEDURE — 3044F PR MOST RECENT HEMOGLOBIN A1C LEVEL <7.0%: ICD-10-PCS | Mod: CPTII,95,, | Performed by: STUDENT IN AN ORGANIZED HEALTH CARE EDUCATION/TRAINING PROGRAM

## 2023-12-21 PROCEDURE — 99213 OFFICE O/P EST LOW 20 MIN: CPT | Mod: AF,HB,95, | Performed by: STUDENT IN AN ORGANIZED HEALTH CARE EDUCATION/TRAINING PROGRAM

## 2023-12-21 PROCEDURE — 1159F MED LIST DOCD IN RCRD: CPT | Mod: CPTII,95,, | Performed by: STUDENT IN AN ORGANIZED HEALTH CARE EDUCATION/TRAINING PROGRAM

## 2023-12-21 PROCEDURE — 3044F HG A1C LEVEL LT 7.0%: CPT | Mod: CPTII,95,, | Performed by: STUDENT IN AN ORGANIZED HEALTH CARE EDUCATION/TRAINING PROGRAM

## 2023-12-21 PROCEDURE — 1159F PR MEDICATION LIST DOCUMENTED IN MEDICAL RECORD: ICD-10-PCS | Mod: CPTII,95,, | Performed by: STUDENT IN AN ORGANIZED HEALTH CARE EDUCATION/TRAINING PROGRAM

## 2023-12-21 PROCEDURE — 1160F RVW MEDS BY RX/DR IN RCRD: CPT | Mod: CPTII,95,, | Performed by: STUDENT IN AN ORGANIZED HEALTH CARE EDUCATION/TRAINING PROGRAM

## 2023-12-21 PROCEDURE — 99213 PR OFFICE/OUTPT VISIT, EST, LEVL III, 20-29 MIN: ICD-10-PCS | Mod: AF,HB,95, | Performed by: STUDENT IN AN ORGANIZED HEALTH CARE EDUCATION/TRAINING PROGRAM

## 2023-12-21 NOTE — PROGRESS NOTES
"Outpatient Psychiatry Follow-Up Visit    12/21/2023    Clinical Status of Patient:  Outpatient (Ambulatory)    Chief Complaint:  Salma Montero is a 28 y.o. female who presents today for follow-up of MDD, FLORENCE, PTSD, insomnia. Patient last seen for follow-up on 11/3/2023.  Met with patient.      TELE PSYCHIATRY Disclaimer   *The patient was informed despite using HIPPA compliant technology there may be risks including security breach, technological failure, inability to perform a comprehensive physical exam which could delay or prevent an accurate diagnosis, and potential complications from treatment decisions rendered over a telemedical platform.   The patient was also informed of the relationship between the physician and patient and the respective role of any other health care provider with respect to management of the patient; and notified that the pt may decline to receive medical services by telemedicine and may withdraw from such care at any time.     Patient's Current location: [address] [place] [address]    In Case of Emergency pts next of kin  Name:Fer REARDON)  Phone number:  [phone]  Visit type: Virtual visit with synchronous audio and video  Total time spent with patient: 15 minutes    Interval History and Content of Current Session:  Interim Events/Subjective Report/Content of Current Session:   Pt reports doing "pretty good"  overall.  Notes she is currently 13wks pregnant.  Reports she's managing her emotions well.  Has been struggling with nausea, using scopolamine, reglan, zofran.  Reports well controlled mood, endorses depression due to family stressors, stable anxiety.  Sleep "pretty good". Appetite fair (trouble in the morning due to nausea), weight decreased slightly.  Energy low, motivation good.  Denies change in irritability, denies hopelessness.  Denies SI/HI/AVH/paranoia, denies plan or desire for self harm or harm to others.  Denies SE from current regimen. Reports " "somatic complaints of nausea, vomiting, snoring. Pt happy with current regimen and wants to continue.     Review of Systems   PSYCHIATRIC: Pertinant items are noted in the narrative.  CONSTITUTIONAL: No weight gain or loss.  +snoring.  MUSCULOSKELETAL: No pain or stiffness of the joints.  NEUROLOGIC: No weakness, sensory changes, seizures, confusion, memory loss, tremor or other abnormal movements.    RESPIRATORY: No shortness of breath.  CARDIOVASCULAR: No tachycardia or chest pain.    GASTROINTESTINAL: + vomiting, +nausea, diarrhea.      Past Medical, Family and Social History: The patient's past medical, family and social history have been reviewed and updated as appropriate within the electronic medical record - see encounter notes.    Compliance: good    Side effects: denies    Risk Parameters:  Patient reports no suicidal ideation  Patient reports no homicidal ideation  Patient reports no self-injurious behavior  Patient reports no violent behavior    Exam (detailed: at least 9 elements; comprehensive: all 15 elements)   Constitutional  Vitals:  Most recent vital signs, dated less than 90 days prior to this appointment, were reviewed.     There were no vitals filed for this visit.       General:   Constitutional: No acute distress, appears stated age, casually dressed    Neurologic:   Motor: moves all extremities spontaneously and without difficulty, no abnormal involuntary movements observed  Gait: normal gait and station    Mental status examination:   Appearance: appears stated age, casually dressed, no acute distress  Behavior: calm and cooperative  Mood: "good"  Affect: mood congruent, euthymic  Thought process: linear and goal directed  Associations: appropriate for conversation  Thought content: no plan or desire for self harm or harm to others, denies paranoia, no delusional ideation volunteered  Perceptions: denies hallucinations or other altered perceptions  Orientation: oriented to day of week, " month, year, location and situation  Language: English, fluid  Attention: able to attend to interview  Insight: good  Judgement: good    PHQ9:  Over the last two weeks how often have you been bothered by little interest or pleasure in doing things: 1  Over the last two weeks how often have you been bothered by feeling down, depressed or hopeless: 1  PHQ-2 Total Score: 2  PHQ-9 Score: 14  PHQ-9 Interpretation: Moderate        11/3/2023     8:42 AM 9/6/2023    11:09 AM 7/6/2023    11:26 AM   GAD7   1. Feeling nervous, anxious, or on edge? 3 3 1   2. Not being able to stop or control worrying? 3 3 3   3. Worrying too much about different things? 3 3 3   4. Trouble relaxing? 1 3 1   5. Being so restless that it is hard to sit still? 1 3 0   6. Becoming easily annoyed or irritable? 3 3 3   7. Feeling afraid as if something awful might happen? 1 3 3   8. If you checked off any problems, how difficult have these problems made it for you to do your work, take care of things at home, or get along with other people? 2 3 1   FLORENCE-7 Score 15 21 14     Assessment and Diagnosis   Status/Progress: Based on the examination today, the patient's problem(s) is/are well controlled.  New problems have not been presented today.   Co-morbidities and Lack of compliance are not complicating management of the primary condition.  Number of separate conditions addressed during today's visit: 2 (mood well controlled, sleep fair control).  Are medication adjustments being made today: No.  Are referral(s) being ordered today: No.  Complexity (level) of medical decision making employed in the encounter: MODERATE.    General Impression:    ICD-10-CM ICD-9-CM   1. Moderate episode of recurrent major depressive disorder  F33.1 296.32   2. FLORENCE (generalized anxiety disorder)  F41.1 300.02       Intervention/Counseling/Treatment Plan   Continue trazodone 100-200mg nightly prn insomnia  Continue lamotrigine 150mg bid  Continue trintellix 20mg daily  No  need for PEC as pt is not an imminent danger to self or others or gravely disabled due to acute psychiatric illness  Discussed that pt should either call clinic for psychiatric crisis symptoms or present to nearest emergency room    Discussed with patient informed consent including diagnosis, risks and benefits of proposed treatment above vs. alternative treatments vs. no treatment, as well as serious and common side effects of these treatments, and the inherent unpredictability of individual responses to these treatments. The patient expresses understanding of the above and displays the capacity to agree with this current plan. Patient also agrees that, currently, the benefits outweigh the risks and would like to pursue treatment at this time, and had no other questions.    Instructions:  Take all medications as prescribed.    Abstain from recreational drugs and alcohol.  Present to ED or call 911 for SI/HI plan or intent, psychosis, or medical emergency.    Return to Clinic: Follow up in about 2 months (around 2/21/2024).    Total time: Total time spent with patient 15 minutes.     Damian Ward MD  [facility]

## 2023-12-27 ENCOUNTER — LAB VISIT (OUTPATIENT)
Dept: LAB | Facility: HOSPITAL | Age: 28
End: 2023-12-27
Attending: STUDENT IN AN ORGANIZED HEALTH CARE EDUCATION/TRAINING PROGRAM
Payer: MEDICAID

## 2023-12-27 DIAGNOSIS — Z34.01 ENCOUNTER FOR SUPERVISION OF NORMAL FIRST PREGNANCY IN FIRST TRIMESTER: Primary | ICD-10-CM

## 2023-12-27 LAB
ERYTHROCYTE [DISTWIDTH] IN BLOOD BY AUTOMATED COUNT: 13.5 % (ref 11.5–17)
GROUP & RH: NORMAL
HCT VFR BLD AUTO: 37.8 % (ref 37–47)
HGB BLD-MCNC: 13.3 G/DL (ref 12–16)
HIV 1+2 AB+HIV1 P24 AG SERPL QL IA: NONREACTIVE
INDIRECT COOMBS: NORMAL
MCH RBC QN AUTO: 29.3 PG (ref 27–31)
MCHC RBC AUTO-ENTMCNC: 35.2 G/DL (ref 33–36)
MCV RBC AUTO: 83.3 FL (ref 80–94)
NRBC BLD AUTO-RTO: 0 %
PLATELET # BLD AUTO: 307 X10(3)/MCL (ref 130–400)
PMV BLD AUTO: 9.3 FL (ref 7.4–10.4)
RBC # BLD AUTO: 4.54 X10(6)/MCL (ref 4.2–5.4)
SPECIMEN OUTDATE: NORMAL
T PALLIDUM AB SER QL: NONREACTIVE
WBC # SPEC AUTO: 9.46 X10(3)/MCL (ref 4.5–11.5)

## 2023-12-27 PROCEDURE — 86803 HEPATITIS C AB TEST: CPT

## 2023-12-27 PROCEDURE — 36415 COLL VENOUS BLD VENIPUNCTURE: CPT

## 2023-12-27 PROCEDURE — 86780 TREPONEMA PALLIDUM: CPT

## 2023-12-27 PROCEDURE — 86901 BLOOD TYPING SEROLOGIC RH(D): CPT | Performed by: STUDENT IN AN ORGANIZED HEALTH CARE EDUCATION/TRAINING PROGRAM

## 2023-12-27 PROCEDURE — 86762 RUBELLA ANTIBODY: CPT

## 2023-12-27 PROCEDURE — 83020 HEMOGLOBIN ELECTROPHORESIS: CPT

## 2023-12-27 PROCEDURE — 85027 COMPLETE CBC AUTOMATED: CPT

## 2023-12-27 PROCEDURE — 87340 HEPATITIS B SURFACE AG IA: CPT

## 2023-12-27 PROCEDURE — 87389 HIV-1 AG W/HIV-1&-2 AB AG IA: CPT

## 2023-12-28 LAB
HBV SURFACE AG SERPL QL IA: NONREACTIVE
HCV AB SERPL QL IA: NONREACTIVE
HGB A MFR BLD ELPH: 97.4 % (ref 95.8–98)
HGB A2 MFR BLD ELPH: 2.6 % (ref 2–3.3)
HGB F MFR BLD ELPH: 0 % (ref 0–0.9)
HGB FRACT BLD ELPH-IMP: NORMAL
M HPLC HB VARIANT, B: NORMAL
RUBV IGG SERPL IA-ACNC: 1.7
RUBV IGG SERPL QL IA: POSITIVE

## 2024-01-02 NOTE — PROGRESS NOTES
"CHIEF COMPLAINT:   Chief Complaint   Patient presents with    Follow-up     6 mos f/u denies cardiac targets                                                  HPI:  Salma Montero 28 y.o. female presents for follow up and ongoing care.  She was initially seen for syncopal episodes and tachycardia.  At last appointment, patient denied any cardiac complaints and stated that she was feeling well overall.  She denied any further syncopal episodes.  She did endorse at that time that she had occasional dizziness and seeing stars" when she stretching.  At that appointment she also stated that she noticed a significant reduction her palpitations and reported that it was actually occurring too infrequently even quantify.  She stated that she was tolerating the metoprolol well.  Patient has undergone stress test, echo, and 30 day Holter monitor.  See full reports below.  Of note, patient has been referred to [facility], but states that she is going to wait on appointment for now.    Today the patient states that she is feeling stable overall.  She states that her palpitations occur very rarely, stating that she was had 1 or 2 episodes since her last office visit.  Of note, patient is now 18 weeks pregnant.  She states that her pregnancy has been.  From a cardiac standpoint she states that she feels good.  She denies any chest pain, shortness for breath, palpitations, PND orthopnea, lightheadedness, syncope, peripheral edema, or claudication symptoms.  She states that she is able to complete her ADLs without any issues or ischemic symptoms.  She states that she has remained physically active throughout her pregnancy and currently does yoga, walking, and stretching for exercise.  She reports compliance with all her current medications.  She denies any tobacco or other illicit drug use.                                                                                                                                        "                                                                                                                                                           CARDIAC TESTING:  No results found for this or any previous visit.     Results for orders placed during the hospital encounter of 06/29/22     Exercise Stress - EKG     Interpretation Summary    The EKG portion of this study is negative for ischemia.    The patient reported no chest pain during the stress test.    The blood pressure response to stress was normal.    There were no arrhythmias during stress.     Minutes exercised:  5  - ST deviation:  0 mm  Angina:  no angina (0)  - Angina index:  0  Duke treadmill score:  5     Patient has Duke Treadmill Score = 5.  This indicates low risk for cardiovascular events (predicted 4 year survival is 99%                                                                                                                                                                                                                                                                                                                                                                                                                                                                                         CARDIAC TESTING:  No results found for this or any previous visit.    Results for orders placed during the hospital encounter of 06/29/22    Exercise Stress - EKG    Interpretation Summary    The EKG portion of this study is negative for ischemia.    The patient reported no chest pain during the stress test.    The blood pressure response to stress was normal.    There were no arrhythmias during stress.    Minutes exercised:  5  - ST deviation:  0 mm  Angina:  no angina (0)  - Angina index:  0  Duke treadmill score:  5    Patient has Duke Treadmill Score = 5.  This indicates low risk for cardiovascular events (predicted 4 year survival is 99%     No results  found for this or any previous visit.       Patient Active Problem List   Diagnosis    Tinnitus    Obesity    Moderate episode of recurrent major depressive disorder    FLORENCE (generalized anxiety disorder)    Gastroesophageal reflux disease    Heart palpitations    PTSD (post-traumatic stress disorder)    Migraine without aura and without status migrainosus, not intractable    Orthostatic dizziness    Nausea and vomiting    Chronic tension-type headache, not intractable    High-risk pregnancy in first trimester     Past Surgical History:   Procedure Laterality Date    ADENOIDECTOMY      TONSILLECTOMY      TONSILLECTOMY AND ADENOIDECTOMY      UPPER GASTROINTESTINAL ENDOSCOPY       Social History     Socioeconomic History    Marital status:    Tobacco Use    Smoking status: Former     Types: Vaping with nicotine    Smokeless tobacco: Never    Tobacco comments:     I just stated smoking a vape in June   Substance and Sexual Activity    Alcohol use: Yes     Alcohol/week: 2.0 standard drinks of alcohol     Types: 2 Shots of liquor per week     Comment: couple times a month    Drug use: Yes     Frequency: 21.0 times per week     Types: Marijuana    Sexual activity: Yes     Partners: Male     Birth control/protection: None     Social Determinants of Health     Physical Activity: Inactive (6/16/2022)    Exercise Vital Sign     Days of Exercise per Week: 0 days     Minutes of Exercise per Session: 0 min        Family History   Problem Relation Age of Onset    Heart failure Father     Alcohol abuse Father     Drug abuse Father     Early death Father         Heart attack    Bipolar disorder Sister     Crohn's disease Sister     Depression Sister     Mental illness Sister     Cancer Maternal Grandmother     Anxiety disorder Maternal Grandmother     Alzheimer's disease Maternal Grandmother     Thyroid disease Maternal Grandmother     Mental illness Maternal Grandmother     Heart failure Maternal Grandfather     Diabetes  "Maternal Grandfather     Skin cancer Maternal Grandfather     Cancer Maternal Grandfather     Bipolar disorder Cousin     Bipolar disorder Cousin     Mental illness Mother     Heart disease Paternal Grandmother     Depression Sister     Mental illness Sister      Review of patient's allergies indicates:   Allergen Reactions    Corn meal-luffa cylindrica frt Anaphylaxis, Hives, Itching and Rash    Orange Hives and Itching    Sulfa (sulfonamide antibiotics) Rash, Hives and Itching         ROS:  Review of Systems   Constitutional: Negative.    HENT: Negative.     Eyes: Negative.    Respiratory: Negative.  Negative for shortness of breath.    Cardiovascular:  Negative for chest pain, palpitations (Rare), orthopnea, claudication, leg swelling and PND.   Gastrointestinal: Negative.    Genitourinary: Negative.    Musculoskeletal: Negative.    Skin: Negative.    Neurological: Negative.  Negative for dizziness (Rare) and weakness.   Endo/Heme/Allergies: Negative.    Psychiatric/Behavioral: Negative.                                                                                                                                                                                  Negative except as stated in the history of present illness. See HPI for details.    PHYSICAL EXAM:  Visit Vitals  /78 (BP Location: Right arm, Patient Position: Sitting, BP Method: Medium (Automatic))   Pulse 94   Temp 98.5 °F (36.9 °C)   Resp 18   Ht 5' 6.04" (1.677 m)   Wt 91.3 kg (201 lb 4.5 oz)   LMP 09/23/2023 (Exact Date)   SpO2 96%   BMI 32.45 kg/m²       Physical Exam  HENT:      Head: Normocephalic.      Nose: Nose normal.      Mouth/Throat:      Mouth: Mucous membranes are moist.   Eyes:      Extraocular Movements: Extraocular movements intact.   Cardiovascular:      Rate and Rhythm: Normal rate and regular rhythm.      Pulses: Normal pulses.      Heart sounds: Normal heart sounds.   Pulmonary:      Effort: Pulmonary effort is normal. "   Abdominal:      General: There is no distension.   Musculoskeletal:         General: Normal range of motion.      Cervical back: Normal range of motion.      Right lower leg: No edema.      Left lower leg: No edema.   Skin:     General: Skin is warm.   Neurological:      General: No focal deficit present.      Mental Status: She is alert.   Psychiatric:         Mood and Affect: Mood normal.         Current Outpatient Medications   Medication Instructions    aspirin (ECOTRIN) 162 mg, Oral, Daily, Start at 12 weeks gestation.  At 36 weeks gestation, decrease to one 81mg tablet daily.    cetirizine (ALLER-VALE) 10 MG tablet 1 tablet, Oral, Daily    cholecalciferol (vitamin D3) (VITAMIN D3) 50 mcg, Oral, Daily    famotidine-calcium carbonate-magnesium hydroxide (PEPCID COMPLETE) -165 mg 1 tablet, Oral, Daily PRN    folic acid (FOLVITE) 1 mg, Oral, Daily    inulin-chromium picolinate (FIBER SELECT GUMMIES) 2-100 gram-mcg Chew 2 tablets, Oral, Daily    lamoTRIgine (LAMICTAL) 150 mg, Oral, 2 times daily    metoclopramide HCl (REGLAN) 10 mg, Oral, 3 times daily with meals    metoprolol succinate (TOPROL-XL) 50 mg, Oral, Nightly    ondansetron (ZOFRAN-ODT) 4 mg, Oral, Every 6 hours PRN    pantoprazole (PROTONIX) 40 mg, Oral, Daily    promethazine (PHENERGAN) 25 mg, Rectal, Every 6 hours PRN    scopolamine (TRANSDERM-SCOP) 1.3-1.5 mg (1 mg over 3 days) 1 patch, Transdermal, Every 72 hours    sumatriptan (IMITREX) 100 mg, Oral, Every 2 hours PRN    traZODone (DESYREL) 100-200 mg, Oral, Nightly PRN    vortioxetine (TRINTELLIX) 20 mg, Oral, Daily        All medications, laboratory studies, cardiac diagnostic imaging reviewed.     Lab Results   Component Value Date    LDL 90.00 08/14/2023    .00 12/15/2022    TRIG 215 (H) 08/14/2023    TRIG 150 (H) 12/15/2022    CREATININE 0.84 11/02/2023    MG 1.80 08/09/2023    K 3.6 11/02/2023        ASSESSMENT/PLAN:     Syncope & Collapse   - No further episodes of syncope or  collapse since initial episode  - 30 day event monitor was unremarkable- see full report above  - Symptoms are concerning for carotid hypersensitivity  - Refered to Dr. Ismael Arredondo at [facility] for further evaluation and autonomic testing - patient has not seen him yet and is holding off for now  - Continue metoprolol succinate 50 mg daily  - Counseled on hydration and compression socks      Palpitatons  - Most recent 30-day event monitor was unremarkable- see full report above  - Great reduction in symptoms since starting medication- tolerating very well  - Continue metoprolol   - Reports very rare palpitations that she is actually unable to quantify given the rarity.  She states that they have only occurred once or twice since last office visit  - Counseled on the importance of adequate hydration, anxiety and stress management, caffeine limitations  - Given that patient is pregnant, advised her that palpitations could increase during this time  - Advised her to notify clinic for any worsening or change in symptoms    Pregnancy        - Currently  17w2d pregnant        - Management per OBGYN/Tish      Follow up in cardiology clinic in 6 months or sooner if needed  Follow up with PCP/OBGYN/Dr. Winston as directed   Please notify cardiology clinic for any change in symptoms or further questions or concerns

## 2024-01-22 ENCOUNTER — OFFICE VISIT (OUTPATIENT)
Dept: CARDIOLOGY | Facility: CLINIC | Age: 29
End: 2024-01-22
Payer: MEDICAID

## 2024-01-22 VITALS
BODY MASS INDEX: 32.34 KG/M2 | DIASTOLIC BLOOD PRESSURE: 78 MMHG | HEIGHT: 66 IN | WEIGHT: 201.25 LBS | SYSTOLIC BLOOD PRESSURE: 118 MMHG | RESPIRATION RATE: 18 BRPM | TEMPERATURE: 99 F | OXYGEN SATURATION: 96 % | HEART RATE: 94 BPM

## 2024-01-22 DIAGNOSIS — F41.1 GAD (GENERALIZED ANXIETY DISORDER): Chronic | ICD-10-CM

## 2024-01-22 DIAGNOSIS — R00.2 HEART PALPITATIONS: Primary | Chronic | ICD-10-CM

## 2024-01-22 PROCEDURE — 3008F BODY MASS INDEX DOCD: CPT | Mod: CPTII,,,

## 2024-01-22 PROCEDURE — 3078F DIAST BP <80 MM HG: CPT | Mod: CPTII,,,

## 2024-01-22 PROCEDURE — 1160F RVW MEDS BY RX/DR IN RCRD: CPT | Mod: CPTII,,,

## 2024-01-22 PROCEDURE — 99215 OFFICE O/P EST HI 40 MIN: CPT | Mod: PBBFAC

## 2024-01-22 PROCEDURE — 99214 OFFICE O/P EST MOD 30 MIN: CPT | Mod: S$PBB,,,

## 2024-01-22 PROCEDURE — 3074F SYST BP LT 130 MM HG: CPT | Mod: CPTII,,,

## 2024-01-22 PROCEDURE — 1159F MED LIST DOCD IN RCRD: CPT | Mod: CPTII,,,

## 2024-01-22 RX ORDER — METOPROLOL SUCCINATE 50 MG/1
50 TABLET, EXTENDED RELEASE ORAL NIGHTLY
Qty: 30 TABLET | Refills: 6 | Status: ON HOLD | OUTPATIENT
Start: 2024-01-22 | End: 2024-08-19

## 2024-01-22 NOTE — PATIENT INSTRUCTIONS
Follow up in cardiology clinic in 6 months or sooner if needed  Follow up with PCP/OBGYN/Dr. Winston as directed   Please notify cardiology clinic for any change in symptoms or further questions or concerns

## 2024-01-26 DIAGNOSIS — O35.5XX0: Primary | ICD-10-CM

## 2024-01-29 ENCOUNTER — PROCEDURE VISIT (OUTPATIENT)
Dept: MATERNAL FETAL MEDICINE | Facility: CLINIC | Age: 29
End: 2024-01-29
Payer: MEDICAID

## 2024-01-29 ENCOUNTER — OFFICE VISIT (OUTPATIENT)
Dept: MATERNAL FETAL MEDICINE | Facility: CLINIC | Age: 29
End: 2024-01-29
Payer: MEDICAID

## 2024-01-29 VITALS
DIASTOLIC BLOOD PRESSURE: 69 MMHG | SYSTOLIC BLOOD PRESSURE: 97 MMHG | BODY MASS INDEX: 32.3 KG/M2 | HEART RATE: 104 BPM | WEIGHT: 201 LBS | HEIGHT: 66 IN

## 2024-01-29 DIAGNOSIS — Z86.69 H/O MIGRAINE DURING PREGNANCY: ICD-10-CM

## 2024-01-29 DIAGNOSIS — O99.612 CONSTIPATION DURING PREGNANCY IN SECOND TRIMESTER: ICD-10-CM

## 2024-01-29 DIAGNOSIS — O99.212 OBESITY AFFECTING PREGNANCY IN SECOND TRIMESTER, UNSPECIFIED OBESITY TYPE: ICD-10-CM

## 2024-01-29 DIAGNOSIS — Z87.59 H/O MIGRAINE DURING PREGNANCY: ICD-10-CM

## 2024-01-29 DIAGNOSIS — O35.5XX0: ICD-10-CM

## 2024-01-29 DIAGNOSIS — G43.009 MIGRAINE WITHOUT AURA AND WITHOUT STATUS MIGRAINOSUS, NOT INTRACTABLE: ICD-10-CM

## 2024-01-29 DIAGNOSIS — R10.2 PAIN OF ROUND LIGAMENT AFFECTING PREGNANCY, ANTEPARTUM: ICD-10-CM

## 2024-01-29 DIAGNOSIS — O26.899 PAIN OF ROUND LIGAMENT AFFECTING PREGNANCY, ANTEPARTUM: ICD-10-CM

## 2024-01-29 DIAGNOSIS — K59.00 CONSTIPATION DURING PREGNANCY IN SECOND TRIMESTER: ICD-10-CM

## 2024-01-29 DIAGNOSIS — F43.10 PTSD (POST-TRAUMATIC STRESS DISORDER): Chronic | ICD-10-CM

## 2024-01-29 DIAGNOSIS — O21.9 NAUSEA AND VOMITING DURING PREGNANCY: ICD-10-CM

## 2024-01-29 DIAGNOSIS — R00.2 HEART PALPITATIONS: Primary | Chronic | ICD-10-CM

## 2024-01-29 PROBLEM — O09.91 HIGH-RISK PREGNANCY IN FIRST TRIMESTER: Status: RESOLVED | Noted: 2023-11-21 | Resolved: 2024-01-29

## 2024-01-29 PROCEDURE — 3078F DIAST BP <80 MM HG: CPT | Mod: CPTII,S$GLB,, | Performed by: OBSTETRICS & GYNECOLOGY

## 2024-01-29 PROCEDURE — 3008F BODY MASS INDEX DOCD: CPT | Mod: CPTII,S$GLB,, | Performed by: OBSTETRICS & GYNECOLOGY

## 2024-01-29 PROCEDURE — 1159F MED LIST DOCD IN RCRD: CPT | Mod: CPTII,S$GLB,, | Performed by: OBSTETRICS & GYNECOLOGY

## 2024-01-29 PROCEDURE — 3074F SYST BP LT 130 MM HG: CPT | Mod: CPTII,S$GLB,, | Performed by: OBSTETRICS & GYNECOLOGY

## 2024-01-29 PROCEDURE — 76811 OB US DETAILED SNGL FETUS: CPT | Mod: S$GLB,,, | Performed by: OBSTETRICS & GYNECOLOGY

## 2024-01-29 PROCEDURE — 99204 OFFICE O/P NEW MOD 45 MIN: CPT | Mod: 25,TH,S$GLB, | Performed by: OBSTETRICS & GYNECOLOGY

## 2024-01-29 NOTE — PROGRESS NOTES
Maternal Fetal Medicine Consult    Subjective     Patient ID: [id number]    Chief Complaint: MFM consult with US (PTSD on Medication.  Patient is having constipation and Right sided pain.)      HPI: 28 y.o.  female  at 18w2d gestation with Estimated Date of Delivery: 24 by LMP, consistent with early US. She is sent for MFM consultation for PTSD on psych meds.     She has history of PTSD and bipolar disorder diagnosed in .  She is currently on Lamictal 150 mg twice daily, trazodone 100-200 mg as needed nightly, and Trintellix 20 mg daily.  She is also on folic acid 1 mg daily.  She is history of migraine headaches and used to take sumatriptan as needed which was discontinued when she became pregnant. She reports the Lamictal also helps with her migraines.  She reports she has had maybe 1 or 2 migraine since has been pregnant.  She has history of vitamin-D deficiency and is on 2000 IU of vitamin-D daily.  She has history of palpitations that occur irregularly.  She has been on metoprolol XL 50 mg daily since 2023. On 2023, TSH was 2.145 and free T4 was 1.33.  She saw cardiology on 2024 and had uneventful Holter monitor, normal EKG, and normal exercise stress test.  She has increased BMI of 32.4 at initial MFM consultation visit.  She is on low-dose aspirin daily.  She reports she has had nausea and vomiting daily since the beginning of pregnancy.  She reports she takes Unisom nightly and Zofran daily.  She reports she also uses scopolamine patch, Reglan, and Phenergan as needed.  She reports she has had some constipation, not currently taking any medications for this.  She also reports a right-sided sharp pain that occurs with certain activities including twisting/position changes and with sudden movements.  She denies any contractions, vaginal bleeding, leaking of fluid.  Patient is accompanied by her mother, Nona Head.       She denies any personal or family history of aneuploidy or  anomalies. She denies any exposure to high fevers, viral rashes, illicit drugs or alcohol in this pregnancy.  She denies any leaking fluid, vaginal bleeding, contractions, decreased fetal movement. Denies headaches, visual disturbances, or epigastric pain.       Pregnancy complications include:  Patient Active Problem List   Diagnosis    Tinnitus    Increased BMI affecting pregnancy in second trimester    Moderate episode of recurrent major depressive disorder    FLORENCE (generalized anxiety disorder)    Gastroesophageal reflux disease    Heart palpitations    PTSD (post-traumatic stress disorder)    Migraine without aura and without status migrainosus, not intractable    Orthostatic dizziness    Nausea and vomiting during pregnancy    Chronic tension-type headache, not intractable    H/O migraine during pregnancy    Pain of round ligament affecting pregnancy, antepartum    Constipation during pregnancy in second trimester        Past Medical History:   Diagnosis Date    Dizziness     Endometriosis of uterus     GERD (gastroesophageal reflux disease)     Headache     Obesity     Palpitations     PTSD (post-traumatic stress disorder)     BiPolar disorder   dx. 2022       Past Surgical History:   Procedure Laterality Date    ADENOIDECTOMY      TONSILLECTOMY      TONSILLECTOMY AND ADENOIDECTOMY      UPPER GASTROINTESTINAL ENDOSCOPY         Family History   Problem Relation Age of Onset    Heart failure Father     Alcohol abuse Father     Drug abuse Father     Early death Father         Heart attack    Bipolar disorder Sister     Crohn's disease Sister     Depression Sister     Mental illness Sister     Cancer Maternal Grandmother     Anxiety disorder Maternal Grandmother     Alzheimer's disease Maternal Grandmother     Thyroid disease Maternal Grandmother     Mental illness Maternal Grandmother     Heart failure Maternal Grandfather     Diabetes Maternal Grandfather     Skin cancer Maternal Grandfather     Cancer Maternal  Grandfather     Bipolar disorder Cousin     Bipolar disorder Cousin     Mental illness Mother     Heart disease Paternal Grandmother     Depression Sister     Mental illness Sister        Social History     Socioeconomic History    Marital status:    Tobacco Use    Smoking status: Former     Types: Vaping with nicotine    Smokeless tobacco: Never    Tobacco comments:     I just stated smoking a vape in June   Substance and Sexual Activity    Alcohol use: Not Currently     Alcohol/week: 2.0 standard drinks of alcohol     Types: 2 Shots of liquor per week     Comment: couple times a month    Drug use: Not Currently     Frequency: 21.0 times per week     Types: Marijuana    Sexual activity: Yes     Partners: Male     Birth control/protection: None     Social Determinants of Health     Physical Activity: Inactive (6/16/2022)    Exercise Vital Sign     Days of Exercise per Week: 0 days     Minutes of Exercise per Session: 0 min       Current Outpatient Medications   Medication Sig Dispense Refill    aspirin (ECOTRIN) 81 MG EC tablet Take 2 tablets (162 mg total) by mouth once daily. Start at 12 weeks gestation.  At 36 weeks gestation, decrease to one 81mg tablet daily. 60 tablet 6    cetirizine (ALLER-VALE) 10 MG tablet Take 1 tablet by mouth once daily.      cholecalciferol, vitamin D3, (VITAMIN D3) 50 mcg (2,000 unit) Cap capsule Take 50 mcg by mouth once daily.      famotidine-calcium carbonate-magnesium hydroxide (PEPCID COMPLETE) -165 mg Take 1 tablet by mouth daily as needed.      folic acid (FOLVITE) 1 MG tablet Take 1 tablet (1 mg total) by mouth once daily. 30 tablet 4    inulin-chromium picolinate (FIBER SELECT GUMMIES) 2-100 gram-mcg Chew Take 2 tablets by mouth once daily.      lamoTRIgine (LAMICTAL) 150 MG Tab Take 1 tablet (150 mg total) by mouth 2 (two) times daily. 60 tablet 5    metoclopramide HCl (REGLAN) 10 MG tablet Take 1 tablet (10 mg total) by mouth 3 (three) times daily with meals. 90  tablet 1    metoprolol succinate (TOPROL-XL) 50 MG 24 hr tablet Take 1 tablet (50 mg total) by mouth every evening. 30 tablet 6    ondansetron (ZOFRAN-ODT) 4 MG TbDL Take 1 tablet (4 mg total) by mouth every 6 (six) hours as needed (nausea and vomiting). 40 tablet 5    pantoprazole (PROTONIX) 40 MG tablet Take 1 tablet (40 mg total) by mouth once daily. 30 tablet 11    promethazine (PHENERGAN) 25 MG suppository Place 1 suppository (25 mg total) rectally every 6 (six) hours as needed for Nausea. 10 suppository 1    scopolamine (TRANSDERM-SCOP) 1.3-1.5 mg (1 mg over 3 days) Place 1 patch onto the skin every 72 hours. 10 patch 3    traZODone (DESYREL) 100 MG tablet Take 1-2 tablets (100-200 mg total) by mouth nightly as needed for Insomnia. 60 tablet 5    vortioxetine (TRINTELLIX) 20 mg Tab Take 1 tablet (20 mg total) by mouth Daily. 30 tablet 5    sumatriptan (IMITREX) 100 MG tablet Take 1 tablet (100 mg total) by mouth every 2 (two) hours as needed for Migraine. 10 tablet 4     No current facility-administered medications for this visit.       Review of patient's allergies indicates:   Allergen Reactions    Corn meal-luffa cylindrica frt Anaphylaxis, Hives, Itching and Rash    Orange Hives and Itching    Sulfa (sulfonamide antibiotics) Rash, Hives and Itching       Medications:  Current Outpatient Medications   Medication Instructions    aspirin (ECOTRIN) 162 mg, Oral, Daily, Start at 12 weeks gestation.  At 36 weeks gestation, decrease to one 81mg tablet daily.    cetirizine (ALLER-VALE) 10 MG tablet 1 tablet, Oral, Daily    cholecalciferol (vitamin D3) (VITAMIN D3) 50 mcg, Oral, Daily    famotidine-calcium carbonate-magnesium hydroxide (PEPCID COMPLETE) -165 mg 1 tablet, Oral, Daily PRN    folic acid (FOLVITE) 1 mg, Oral, Daily    inulin-chromium picolinate (FIBER SELECT GUMMIES) 2-100 gram-mcg Chew 2 tablets, Oral, Daily    lamoTRIgine (LAMICTAL) 150 mg, Oral, 2 times daily    metoclopramide HCl (REGLAN) 10  "mg, Oral, 3 times daily with meals    metoprolol succinate (TOPROL-XL) 50 mg, Oral, Nightly    ondansetron (ZOFRAN-ODT) 4 mg, Oral, Every 6 hours PRN    pantoprazole (PROTONIX) 40 mg, Oral, Daily    promethazine (PHENERGAN) 25 mg, Rectal, Every 6 hours PRN    scopolamine (TRANSDERM-SCOP) 1.3-1.5 mg (1 mg over 3 days) 1 patch, Transdermal, Every 72 hours    sumatriptan (IMITREX) 100 mg, Oral, Every 2 hours PRN    traZODone (DESYREL) 100-200 mg, Oral, Nightly PRN    vortioxetine (TRINTELLIX) 20 mg, Oral, Daily       Review of Systems   12 point review of systems conducted, negative except as stated in the history of present illness. See HPI for details.      Objective     Visit Vitals  BP 97/69 (BP Location: Left arm, Patient Position: Sitting, BP Method: Large (Automatic))   Pulse 104   Ht 5' 6" (1.676 m)   Wt 91.2 kg (201 lb)   LMP 09/23/2023 (Exact Date)   BMI 32.44 kg/m²        Physical Exam  Vitals and nursing note reviewed.   Constitutional:       General: She is not in acute distress.     Appearance: Normal appearance.      Comments: Increased BMI   HENT:      Head: Normocephalic and atraumatic.      Nose: Nose normal. No congestion.      Mouth/Throat:      Pharynx: Oropharynx is clear.   Eyes:      General: No scleral icterus.     Pupils: Pupils are equal, round, and reactive to light.   Cardiovascular:      Rate and Rhythm: Normal rate and regular rhythm.   Pulmonary:      Effort: No respiratory distress.      Breath sounds: Normal breath sounds. No wheezing.   Abdominal:      General: Abdomen is flat.      Palpations: Abdomen is soft.      Tenderness: There is no abdominal tenderness. There is no right CVA tenderness, left CVA tenderness or guarding.      Comments: No CVA tenderness gravid uterus.    Musculoskeletal:         General: Normal range of motion.      Cervical back: Neck supple.      Right lower leg: No edema.      Left lower leg: No edema.   Skin:     General: Skin is warm.      Findings: No " bruising or rash.   Neurological:      General: No focal deficit present.      Mental Status: She is oriented to person, place, and time.      Deep Tendon Reflexes: Reflexes normal.      Comments: Normal reflexes   Psychiatric:         Mood and Affect: Mood normal.         Behavior: Behavior normal.         Thought Content: Thought content normal.         Judgment: Judgment normal.         ASSESSMENT/PLAN:     28 y.o.  female with IUP at 18w2d    History of PTSD, on multiple psychiatric medications  There is normal fetal growth and no anomalies seen on fetal anatomy scan.  Fetal anatomy scan was incomplete because of fetal position and body habitus limitations     ACOG recommends that therapy for mental health disorders during pregnancy be individualized. Treatment of anxiety and depression should incorporate the clinical expertise of her mental health clinician, her obstetrician, her primary care provider, and her pediatrician. The risks of untreated depression and the benefits of treatment must be weighed against the risks associated with the use of psychiatric medications during pregnancy.    The second generation atypical antipsychotic medications include olanzapine (Zyprexa), aripiprazole (Abilify) and risperidone (Risperdal) like first generation antipsychotics such as haloperidol are used chronic psychotic illnesses like schizophrenia. However, they are also used for bipolar disorder, anxiety disorders and obsessive compulsive disorders, making them commonly used in women of reproductive age.  Additional second generation antipsychotics include clozapine (Clozaril), ziprasidone (Geodon), paliperidone (Invega), quetiapine (Seroquel), asenapine (Saphris), lurasidone (Latuda) and iloperidone (Fanapt). Their effect on the fetus are not clear and data is accumulating.    Lamictal: Previous studies found lamotrigine (7-1000)  to cause cleft lip/cleft palate with use in first trimester. Slight association of  lamotrigine and anencephaly as well as transposition of the great vessels has been found.     Nevertheless, it is felt that second generation antipsychotics are not major teratogens. The risk of anomalies maybe slightly increased up to 4-5%, with cardiovascular malformations (VSD, ASD) being among common anomalies. Poor  adaptations are also more common among the exposed group. Given the potential adverse effects of uncontrolled psychiatric illness on  and obstetrical outcomes, a benefit risk assessment makes their use reasonable depending on severity of disease.     Discussed risks with depression and risks/benefits of antidepressant medication use in pregnancy. Although earlier limited data suggested association of paroxetine (Paxil) with right ventricular outflow tract obstruction and sertraline (Zoloft) with ventricular septal heart defects, a recent () large population based study showed no substantial increase in the risk of cardiac malformations attributable to antidepressant use in 1st trimester. The absolute risk of other reported risks in some studies is very small: omphalocele of 1 in 5,000 births, craniosynostosis 1 in 1,800 births, and anencephaly of 1 in 1,000 births. I discussed with her that the SSRI medication used in pregnancy is associated with potential small risk of pulmonary hypertension when used after 20 weeks gestation (in 6-12 per thousand exposed women). However, discontinuing medication is associated with a higher risk with recurrence of symptoms . Relapse rate is 68% if medication is discontinued, vs 25% with continued medication use. Untreated depression may increase the risk of low weight gain, sexually transmitted diseases, alcohol & substance abuse, all of which have maternal and fetal health implications. Factors associated with relapse during pregnancy include a history of more than 5 years of depressive illness and of more than four episodes of  relapse.    Vortioxetine shares some properties of selective serotonin reuptake inhibitors (SSRIs); additionally, it modulates other serotoninergic activity. As a class, SSRIs have been evaluated extensively in pregnant patients. Studies focusing on  outcomes following first trimester exposure often have inconsistent results due to differences in study design and confounders such as maternal disease and social factors      Depending upon the severity of her symptoms and previous response to discontinued medication, consider weaning down or off medications if possible. Although discontinuing the medication for a few weeks before delivery  has been suggested, to avoid  withdrawal, the potential risks to mom and lack of proven benefit from this approach, makes continuing medication till delivery a reasonable option. Discussed the association of higher  morbidity with SSRI medication use close to delivery (in 30 % of neonates) including higher rate of admission to intensive care unit, jitteriness, mild respiratory distress, tachypnea of the , weak cry, and poor tone.      Continued consultation with mental health provider is very helpful in coordinating care. Patient will continue medication and follow with mental health provider and advised her to report any worsening symptoms or SI/HI at anytime.       History of migraines  She reports the Lamictal also helps with her migraines.  She reports she has had maybe 1 or 2 migraine since has been pregnant.  Advised her to report any worsening and continue follow-up with neurology.      Increased BMI in pregnancy  Body mass index is 32.44 kg/m².    I discussed the risk of miscarriage in first trimester, recurrent miscarriages, congenital anomalies, hypertension, diabetes,  labor and the higher risk of  section and the higher risk of fetal demise in-utero. There is also higher risk of for excessive fetal weight and large for  gestational age (LGA) fetuses. Mothers with LGA fetuses are at higher risk of prolonged labor,  delivery, shoulder dystocia and birth trauma. LGA neonates are increased risk of fetal hypoxia and intrauterine death, and are at risk to develop diabetes, obesity, metabolic syndrome, asthma and cancer later in life. She was advised of the importance of eating healthy and limiting weight gain to 11-20 lbs during the pregnancy, as optimal in this situation. I recommended low calorie, low fat diet avoiding any additional excessive weight gain. Excess weight gain would be associated with gestational hypertension, gestational diabetes and adverse  outcomes, including fetal demise in utero.    It is important to do FKC from 24 weeks till delivery.       Importance of working on losing weight after the pregnancy is over, especially before a future pregnancy was discussed. Breastfeeding may be an important tool in reducing the postpartum weight retention. Fetal risks were discussed with short term risk of fetal/ obesity and long term risk of adolescent component of metabolic syndrome.    Follow a healthy low caloric diet..       At high risk for preeclampsia  With her risk factors for preeclampsia including  nulliparity, BMI over 30, and low socioeconomic status, discussed recommendations for low dose aspirin use to decrease risks for adverse outcomes, including preeclampsia, low birth rate and  delivery. Low-dose aspirin reduced the risk for preeclampsia by 15% in clinical trials and reduced the risk for  birth by 20% and FGR by 20%, and  mortality by around 20%.  After discussing risks/benefits of its use, it was agreed to continue asa 81 mg daily until delivery.. Also, recommend using in all future pregnancies.      Vitamin-D deficiency  I discussed the risks of Vitamin D deficiency in pregnancy with severe deficiency being associated with disordered skeletal homeostasis,  congenital rickets and bone fractures in . It is most common in vegans, no outside exposure and dark-skinned individuals. [place] Vit D level is dependent on maternal Vit D status. Althought no standard recommendation or data available on dosage recommendation in pregnancy, most agree that up to 2-4,000 units of Vit D in pregnancy and lactation is safe. Vit D is directly related to the absorption of calcium. I suggest checking calcium level as well for hypocalcemia. It is suggested to monitor levels throughout pregnancy for accurate titration of dosage.  She was advised to continue vitamin-D 2000 IU daily.      Palpitations/tachycardia  History of tachycardia/palpitations, controlled with metoprolol XL 50 mg daily. Reviewed risks with tachycardia in pregnancy, including risks for recurrence of tachycardia. I discussed with her that some beta-blocker are associated with fetal growth restriction in the setting of hypertension that most likely reflects the effects of chronic hypertension. Outside that setting, there is limited data on the effects of beta-blocker use in pregnancy; however, because of that concern it would be prudent to do a follow up ultrasound to check fetal growth every 3-4 weeks till end of the pregnancy. If evidence of fetal growth restriction is present, then closer fetal surveillance will be needed.     Advised patient to avoid caffeine/dehydration, reduces stress, and eat a balanced, healthy diet.     Recommend continue followup with cardiologist as scheduled.       Round ligament pain  She reports intermittent groin pain that worsens with ambulation and position changes, consistent with round ligament pain. Discussed supportive measures. Instructed patient to notify provider/primary OB if it worsens or becomes associated with nausea/vomiting or fever.       Nausea and vomiting in pregnancy  Discussed with her that nausea and vomiting affects about 50% of pregnancies, and additional 25%  have nausea only. The severe form of the condition, hyperemesis gravidarum with significant weight loss, electrolyte abnormalities affect only about 0.3-1% of pregnancies. She does not appear to be dehydrated. I advised her to take vitamin B6 25 mg three times a day, in addition to Doxylamine 25mg at bed time and 12.5 mg in am and 12.5 mg in afternoon. Phenergan 25 mg every six hours prn rectally.  She was advised to try to avoid using Reglan if possible. Drug-induced acute dystonic reactions have been reported following use of metoclopramide in nonpregnant and pregnant patients; maternal CYP2D6 metabolizer status and hormonal changes that occur during pregnancy may contribute to this risk.    She was advised on a low-fat bland diet, and eat frequent small meals, avoid juices, and avoid fluids with the meals.  If symptoms worsen, patient was advised to go to the emergency room or labor and delivery.       Constipation during pregnancy  I discussed the importance of eating a healthy high fiber diet, along with plenty of hydration (at least 3 Liters of water), in addition to stool softener like Colace 100 mg tid, with dose to be decreased if good/over-response. Supplemental fiber could also be used, daily or twice daily depending on degree of constipation.  If no bowel movement in 3 days, Milk of Magnesia 2 tbsp could be used. Patient will let me know if still having problems despite above measures.     Patient was counseled on antidepressive and psychiatric medication use in pregnancy were there is now 1 formula that fits all with a to continue or stop the medication and risks benefits assessment she will be done.  It seems reasonable from patient's history that she could continue the medication with close follow-up with her psychiatrist.  The concern with Lamictal use and cleft lip palate was discussed and no abnormalities were seen today.  Limitations of ultrasound explained.  We will plan to see her again in about  5 weeks to try to complete anatomy scan.  Potential association of metoprolol with fetal growth restriction was discussed.  Patient feels like heart rate is very high without the medication she wants to continue it understanding potential risks.  We will plan to rechecked 1 more time for that around 32 weeks.  Diet advice.  Proper weight gain recommendations explained. Patient's questions were answered.  She is in agreement with plan of care.         Follow up in about 4 weeks (around 2/26/2024) for MFM follow-up, Repeat ultrasound, Room 1 or 2, to complete anatomy scan.     Future Appointments   Date Time Provider Department Center   1/30/2024  9:30 AM Rico Hay MD [facility]   2/26/2024  8:30 AM Damian Ward MD [facility]   2/26/2024  1:00 PM Ismael Winston MD [facility]   2/26/2024  1:00 PM ROOM 1 AND 2, [facility]   7/22/2024 10:45 AM Idalia Alegria PA-C [facility] C[facility] [facility]   7/23/2024  1:00 PM Daly Colon MD [facility] NEURO [facility]        Patient was evaluated by CHICHI Zepeda and Dr. Winston.  Final assessment and recommendations as stated above were made by Dr. Winston.    This note was created with the assistance of [employer] voice recognition software. There may be transcription errors as a result of using this technology, however minimal. Effort has been made to ensure accuracy of transcription, but any obvious errors or omissions should be clarified with the author of the document.

## 2024-02-22 DIAGNOSIS — O35.5XX0: Primary | ICD-10-CM

## 2024-02-26 ENCOUNTER — OFFICE VISIT (OUTPATIENT)
Dept: MATERNAL FETAL MEDICINE | Facility: CLINIC | Age: 29
End: 2024-02-26
Payer: MEDICAID

## 2024-02-26 ENCOUNTER — PATIENT MESSAGE (OUTPATIENT)
Dept: BEHAVIORAL HEALTH | Facility: CLINIC | Age: 29
End: 2024-02-26
Payer: MEDICAID

## 2024-02-26 ENCOUNTER — PROCEDURE VISIT (OUTPATIENT)
Dept: MATERNAL FETAL MEDICINE | Facility: CLINIC | Age: 29
End: 2024-02-26
Payer: MEDICAID

## 2024-02-26 ENCOUNTER — OFFICE VISIT (OUTPATIENT)
Dept: BEHAVIORAL HEALTH | Facility: CLINIC | Age: 29
End: 2024-02-26
Payer: MEDICAID

## 2024-02-26 VITALS
TEMPERATURE: 98 F | DIASTOLIC BLOOD PRESSURE: 67 MMHG | SYSTOLIC BLOOD PRESSURE: 98 MMHG | BODY MASS INDEX: 33.44 KG/M2 | HEART RATE: 84 BPM | WEIGHT: 207.19 LBS | OXYGEN SATURATION: 99 %

## 2024-02-26 VITALS
WEIGHT: 209.19 LBS | HEIGHT: 66 IN | HEART RATE: 91 BPM | BODY MASS INDEX: 33.62 KG/M2 | SYSTOLIC BLOOD PRESSURE: 110 MMHG | DIASTOLIC BLOOD PRESSURE: 60 MMHG

## 2024-02-26 DIAGNOSIS — F33.1 MODERATE EPISODE OF RECURRENT MAJOR DEPRESSIVE DISORDER: Chronic | ICD-10-CM

## 2024-02-26 DIAGNOSIS — F43.10 PTSD (POST-TRAUMATIC STRESS DISORDER): Chronic | ICD-10-CM

## 2024-02-26 DIAGNOSIS — O21.9 NAUSEA AND VOMITING DURING PREGNANCY: ICD-10-CM

## 2024-02-26 DIAGNOSIS — F32.A DEPRESSION AFFECTING PREGNANCY IN SECOND TRIMESTER, ANTEPARTUM: ICD-10-CM

## 2024-02-26 DIAGNOSIS — O99.342 DEPRESSION AFFECTING PREGNANCY IN SECOND TRIMESTER, ANTEPARTUM: ICD-10-CM

## 2024-02-26 DIAGNOSIS — F41.1 GAD (GENERALIZED ANXIETY DISORDER): Chronic | ICD-10-CM

## 2024-02-26 DIAGNOSIS — K59.00 CONSTIPATION DURING PREGNANCY IN SECOND TRIMESTER: ICD-10-CM

## 2024-02-26 DIAGNOSIS — Z86.69 H/O MIGRAINE DURING PREGNANCY: Primary | ICD-10-CM

## 2024-02-26 DIAGNOSIS — Z87.59 H/O MIGRAINE DURING PREGNANCY: Primary | ICD-10-CM

## 2024-02-26 DIAGNOSIS — O35.5XX0: ICD-10-CM

## 2024-02-26 DIAGNOSIS — O99.612 CONSTIPATION DURING PREGNANCY IN SECOND TRIMESTER: ICD-10-CM

## 2024-02-26 DIAGNOSIS — O26.02 EXCESSIVE WEIGHT GAIN IN PREGNANCY IN SECOND TRIMESTER: ICD-10-CM

## 2024-02-26 DIAGNOSIS — O09.90 AT HIGH RISK FOR COMPLICATIONS OF INTRAUTERINE PREGNANCY (IUP): ICD-10-CM

## 2024-02-26 DIAGNOSIS — R00.2 HEART PALPITATIONS: Chronic | ICD-10-CM

## 2024-02-26 DIAGNOSIS — O99.212 OBESITY AFFECTING PREGNANCY IN SECOND TRIMESTER, UNSPECIFIED OBESITY TYPE: ICD-10-CM

## 2024-02-26 DIAGNOSIS — G43.009 MIGRAINE WITHOUT AURA AND WITHOUT STATUS MIGRAINOSUS, NOT INTRACTABLE: ICD-10-CM

## 2024-02-26 DIAGNOSIS — G47.00 INSOMNIA, UNSPECIFIED TYPE: ICD-10-CM

## 2024-02-26 PROBLEM — H93.19 TINNITUS: Status: RESOLVED | Noted: 2022-06-09 | Resolved: 2024-02-26

## 2024-02-26 PROCEDURE — 1159F MED LIST DOCD IN RCRD: CPT | Mod: CPTII,,, | Performed by: STUDENT IN AN ORGANIZED HEALTH CARE EDUCATION/TRAINING PROGRAM

## 2024-02-26 PROCEDURE — 99213 OFFICE O/P EST LOW 20 MIN: CPT | Mod: PBBFAC,PN | Performed by: STUDENT IN AN ORGANIZED HEALTH CARE EDUCATION/TRAINING PROGRAM

## 2024-02-26 PROCEDURE — 1160F RVW MEDS BY RX/DR IN RCRD: CPT | Mod: CPTII,,, | Performed by: STUDENT IN AN ORGANIZED HEALTH CARE EDUCATION/TRAINING PROGRAM

## 2024-02-26 PROCEDURE — 3078F DIAST BP <80 MM HG: CPT | Mod: CPTII,,, | Performed by: STUDENT IN AN ORGANIZED HEALTH CARE EDUCATION/TRAINING PROGRAM

## 2024-02-26 PROCEDURE — 3074F SYST BP LT 130 MM HG: CPT | Mod: CPTII,S$GLB,, | Performed by: OBSTETRICS & GYNECOLOGY

## 2024-02-26 PROCEDURE — 99213 OFFICE O/P EST LOW 20 MIN: CPT | Mod: TH,S$GLB,, | Performed by: OBSTETRICS & GYNECOLOGY

## 2024-02-26 PROCEDURE — 99213 OFFICE O/P EST LOW 20 MIN: CPT | Mod: AF,HB,S$PBB, | Performed by: STUDENT IN AN ORGANIZED HEALTH CARE EDUCATION/TRAINING PROGRAM

## 2024-02-26 PROCEDURE — 3074F SYST BP LT 130 MM HG: CPT | Mod: CPTII,,, | Performed by: STUDENT IN AN ORGANIZED HEALTH CARE EDUCATION/TRAINING PROGRAM

## 2024-02-26 PROCEDURE — 1159F MED LIST DOCD IN RCRD: CPT | Mod: CPTII,S$GLB,, | Performed by: OBSTETRICS & GYNECOLOGY

## 2024-02-26 PROCEDURE — 3008F BODY MASS INDEX DOCD: CPT | Mod: CPTII,S$GLB,, | Performed by: OBSTETRICS & GYNECOLOGY

## 2024-02-26 PROCEDURE — 3078F DIAST BP <80 MM HG: CPT | Mod: CPTII,S$GLB,, | Performed by: OBSTETRICS & GYNECOLOGY

## 2024-02-26 PROCEDURE — 76816 OB US FOLLOW-UP PER FETUS: CPT | Mod: S$GLB,,, | Performed by: OBSTETRICS & GYNECOLOGY

## 2024-02-26 PROCEDURE — 1160F RVW MEDS BY RX/DR IN RCRD: CPT | Mod: CPTII,S$GLB,, | Performed by: OBSTETRICS & GYNECOLOGY

## 2024-02-26 PROCEDURE — 3008F BODY MASS INDEX DOCD: CPT | Mod: CPTII,,, | Performed by: STUDENT IN AN ORGANIZED HEALTH CARE EDUCATION/TRAINING PROGRAM

## 2024-02-26 RX ORDER — TRAZODONE HYDROCHLORIDE 100 MG/1
100-200 TABLET ORAL NIGHTLY PRN
Qty: 60 TABLET | Refills: 5 | Status: SHIPPED | OUTPATIENT
Start: 2024-02-26 | End: 2024-04-03 | Stop reason: SDUPTHER

## 2024-02-26 NOTE — PROGRESS NOTES
Maternal Fetal Medicine Follow Up    Subjective     Patient ID: [id number]    Chief Complaint: mfm followup w/us      HPI: Salma Montero is a 28 y.o. female  at 22w2d gestation with Estimated Date of Delivery: 24  who is here for follow-up consultation by [facility].    She has history of PTSD and bipolar disorder diagnosed in .  She is currently on Lamictal 150 mg twice daily, trazodone 100-200 mg as needed nightly, and Trintellix 20 mg daily.  She is also on folic acid 1 mg daily.  She is history of migraine headaches and used to take sumatriptan as needed which was discontinued when she became pregnant. She reports the Lamictal also helps with her migraines.  She reports she has had maybe 1 or 2 migraine since has been pregnant.  She has history of vitamin-D deficiency and is on 2000 IU of vitamin-D daily.  She has history of palpitations that occur irregularly.  She has been on metoprolol XL 50 mg daily since 2023. On 2023, TSH was 2.145 and free T4 was 1.33.  She saw cardiology on 2024 and had uneventful Holter monitor, normal EKG, and normal exercise stress test.  She has increased BMI of 32.4 at initial [facility] consultation visit.  She is on low-dose aspirin daily.  She reports she has had nausea and vomiting daily since the beginning of pregnancy.  She is on 3 drug regimen.  She was advised to discontinue Reglan if possible.  She reports she has had some constipation, and has implemented lifestyle modifications with improvement.    Patient was accompanied by her  Fer       Interval history since last M visit: None.. She denies any leaking fluid, vaginal bleeding, contractions, decreased fetal movement. Denies headaches, visual disturbances, or epigastric pain.    Pregnancy complications include:   Patient Active Problem List   Diagnosis    Increased BMI affecting pregnancy in second trimester    Moderate episode of recurrent major depressive disorder    FLORENCE (generalized  anxiety disorder)    Gastroesophageal reflux disease    Heart palpitations    PTSD (post-traumatic stress disorder)    Migraine without aura and without status migrainosus, not intractable    Orthostatic dizziness    Nausea and vomiting during pregnancy    Chronic tension-type headache, not intractable    H/O migraine during pregnancy    Pain of round ligament affecting pregnancy, antepartum    Constipation during pregnancy in second trimester    At high risk for complications of intrauterine pregnancy (IUP)        No changes to medical, surgical, family, social, or obstetric history.    Medications:  Current Outpatient Medications   Medication Instructions    aspirin (ECOTRIN) 162 mg, Oral, Daily, Start at 12 weeks gestation.  At 36 weeks gestation, decrease to one 81mg tablet daily.    cetirizine (ALLER-VALE) 10 MG tablet 1 tablet, Oral, Daily    cholecalciferol (vitamin D3) (VITAMIN D3) 50 mcg, Oral, Daily    famotidine-calcium carbonate-magnesium hydroxide (PEPCID COMPLETE) -165 mg 1 tablet, Oral, Daily PRN    folic acid (FOLVITE) 1 mg, Oral, Daily    inulin-chromium picolinate (FIBER SELECT GUMMIES) 2-100 gram-mcg Chew 2 tablets, Oral, Daily    lamoTRIgine (LAMICTAL) 150 mg, Oral, 2 times daily    metoclopramide HCl (REGLAN) 10 mg, Oral, 3 times daily with meals    metoprolol succinate (TOPROL-XL) 50 mg, Oral, Nightly    ondansetron (ZOFRAN-ODT) 4 mg, Oral, Every 6 hours PRN    pantoprazole (PROTONIX) 40 mg, Oral, Daily    promethazine (PHENERGAN) 25 mg, Rectal, Every 6 hours PRN    scopolamine (TRANSDERM-SCOP) 1.3-1.5 mg (1 mg over 3 days) 1 patch, Transdermal, Every 72 hours    sumatriptan (IMITREX) 100 mg, Oral, Every 2 hours PRN    traZODone (DESYREL) 100-200 mg, Oral, Nightly PRN    vortioxetine (TRINTELLIX) 20 mg, Oral, Daily       Review of Systems   12 point review of systems conducted, negative except as stated in the history of present illness. See HPI for details.      Objective     Visit  "Vitals  /60 (BP Location: Left arm, Patient Position: Sitting, BP Method: Large (Automatic))   Pulse 91   Ht 5' 6" (1.676 m)   Wt 94.9 kg (209 lb 3.2 oz)   LMP 2023 (Exact Date)   BMI 33.77 kg/m²        Physical Exam  Vitals and nursing note reviewed.   Constitutional:       Appearance: Normal appearance.      Comments: Increased BMI   HENT:      Head: Normocephalic and atraumatic.      Nose: Nose normal. No congestion.   Cardiovascular:      Rate and Rhythm: Normal rate.   Pulmonary:      Effort: Pulmonary effort is normal.   Skin:     Findings: No rash.   Neurological:      Mental Status: She is alert and oriented to person, place, and time.   Psychiatric:         Mood and Affect: Mood normal.         Behavior: Behavior normal.         Thought Content: Thought content normal.         Judgment: Judgment normal.         ASSESSMENT/PLAN:     28 y.o.  female with IUP at 22w2d    History of PTSD, on multiple psychiatric medications  There is normal fetal growth with an EFW of 501 g at the 42% and the AC at the 45% on 2024.  AFV is normal.      ACOG recommends that therapy for mental health disorders during pregnancy be individualized. Treatment of anxiety and depression should incorporate the clinical expertise of her mental health clinician, her obstetrician, her primary care provider, and her pediatrician. The risks of untreated depression and the benefits of treatment must be weighed against the risks associated with the use of psychiatric medications during pregnancy.    Continued consultation with mental health provider is very helpful in coordinating care. Patient will continue  Lamictal 150 mg twice daily, trazodone 100-200 mg as needed nightly, and Trintellix 20 mg daily.  She is also on folic acid 1 mg daily.  Advised to follow with mental health provider and advised her to report any worsening symptoms or SI/HI at anytime.       History of migraines  She reports the Lamictal also helps " with her migraines.  She reports she has had maybe 1 or 2 migraine since has been pregnant.  Advised her to report any worsening and continue follow-up with neurology.      Increased BMI in pregnancy with a excessive weight gain  Body mass index is 33.77 kg/m². With excessive weight gain from last visit, 8 lbs in 1 month , she was advised to decrease caloric intake and was reminded of the importance of avoiding excessive weight gain.  Excess weight gain would be associated with gestational hypertension, gestational diabetes and adverse  outcomes, including fetal demise in utero.    It is important to lose weight after the pregnancy is over, especially before a future pregnancy was discussed. Breastfeeding may be an important tool in reducing the postpartum weight retention. Fetal risks were discussed with short term risk of fetal/ obesity and long term risk of adolescent component of metabolic syndrome.    With elevated BMI, will do fetal testing as recommended elsewhere in this note. She was advised to do fetal kick counts 3 times daily after 24 weeks, with immediate reporting of decreased fetal movements (<10 movements/hr).       At high risk for preeclampsia  With her increased risk for preeclampsia, she agreed to continue asa 81 mg daily until delivery.. Preeclampsia precautions reviewed.       Vitamin-D deficiency  It is suggested to monitor levels throughout pregnancy for accurate titration of dosage.  She was advised to continue vitamin-D 2000 IU daily.      Palpitations/tachycardia  History of tachycardia/palpitations, controlled with metoprolol XL 50 mg daily. Reviewed risks with tachycardia in pregnancy, including risks for recurrence of tachycardia. I discussed with her that some beta-blocker are associated with fetal growth restriction in the setting of hypertension that most likely reflects the effects of chronic hypertension. Outside that setting, there is limited data on the effects  of beta-blocker use in pregnancy; however, because of that concern it would be prudent to do a follow up ultrasound to check fetal growth around 32 weeks If evidence of fetal growth restriction is present, then closer fetal surveillance will be needed.     Advised patient to avoid caffeine/dehydration, reduces stress, and eat a balanced, healthy diet.     Recommend continue followup with cardiologist as scheduled.       Nausea and vomiting in pregnancy  I advised her to take vitamin B6 25 mg three times a day, in addition to Doxylamine 25mg at bed time and 12.5 mg in am and 12.5 mg in afternoon. Phenergan 25 mg every six hours prn rectally.  She was advised to try to avoid using Reglan if possible.     She was advised on a low-fat bland diet, and eat frequent small meals, avoid juices, and avoid fluids with the meals.  If symptoms worsen, patient was advised to go to the emergency room or labor and delivery.       Constipation during pregnancy  I discussed the importance of eating a healthy high fiber diet, along with plenty of hydration (at least 3 Liters of water), in addition to stool softener like Colace 100 mg tid, with dose to be decreased if good/over-response. Supplemental fiber could also be used, daily or twice daily depending on degree of constipation.  If no bowel movement in 3 days, Milk of Magnesia 2 tbsp could be used. Patient will let me know if still having problems despite above measures.     There is normal fetal growth at this time with fetal anatomy scan completed except for three-vessel trachea view.  We will plan to rechecked again around 32 weeks.  Emphasized importance of healthy diet and avoiding excess weight gain with risks discussed.  Patient and her 's questions were answered.  They are in agreement with plan of care.    Follow up in about 10 weeks (around 5/6/2024) for MFM follow-up, Repeat ultrasound, Room 1 or 2.     Future Appointments   Date Time Provider Department Center    3/12/2024  9:05 AM Rico Hay MD [facility] [place] [facility]   3/25/2024  8:30 AM Desiree Kiser NP [facility]   7/22/2024 10:45 AM Idalia Alegria PA-C [facility] CARD [place] [facility]   7/23/2024  1:00 PM Daly Colon MD [facility] NEURO [place]         MEÑO involvement: Patient was evaluated and examined by Dr. Winston. CHICHI Zepeda, helped in pre charting of part of note.    This note was created with the assistance of Roamer voice recognition software. There may be transcription errors as a result of using this technology, however minimal. Effort has been made to ensure accuracy of transcription, but any obvious errors or omissions should be clarified with the author of the document.

## 2024-02-26 NOTE — PROGRESS NOTES
"Outpatient Psychiatry Follow-Up Visit    2/26/2024    Clinical Status of Patient:  Outpatient (Ambulatory)    Chief Complaint:  Salma Montero is a 28 y.o. female who presents today for follow-up of MDD, FLORENCE, PTSD, insomnia. Patient last seen for follow-up on 12/21/2023.  Now 23 weeks pregnant.  Met with patient.      Interval History and Content of Current Session:  Interim Events/Subjective Report/Content of Current Session:   Pt reports doing "good"  overall.  Reports good mood, well controlled anxiety.  Sleep "good," endorses vivid dreaming. Appetite improved, weight stable.  Energy good, motivation good.  Denies irritability, denies hopelessness.  Denies SI/HI/AVH/paranoia, denies plan or desire for self harm or harm to others.  Denies SE from current regimen.  Regarding somatic complaints, reports nausea/vomiting have largely resolved.  Pt happy with current regimen and wants to continue.     Review of Systems   PSYCHIATRIC: Pertinant items are noted in the narrative.  CONSTITUTIONAL: No weight gain or loss.   MUSCULOSKELETAL: No pain or stiffness of the joints.  NEUROLOGIC: No weakness, sensory changes, seizures, confusion, memory loss, tremor or other abnormal movements.    RESPIRATORY: No shortness of breath.  CARDIOVASCULAR: No tachycardia or chest pain.    GASTROINTESTINAL: + vomiting (improved), +nausea (improved), diarrhea.    GENITOURINARY: +abdominal tightness    Past Medical, Family and Social History: The patient's past medical, family and social history have been reviewed and updated as appropriate within the electronic medical record - see encounter notes.    Compliance: good    Side effects: denies    Risk Parameters:  Patient reports no suicidal ideation  Patient reports no homicidal ideation  Patient reports no self-injurious behavior  Patient reports no violent behavior    Exam (detailed: at least 9 elements; comprehensive: all 15 elements)   Constitutional  Vitals:  Most recent vital " "signs, dated less than 90 days prior to this appointment, were reviewed.     Vitals:    02/26/24 0829   BP: 98/67   Pulse: 84   Temp: 97.6 °F (36.4 °C)   SpO2: 99%   Weight: 94 kg (207 lb 3.2 oz)          General:   Constitutional: No acute distress, appears stated age, casually dressed    Neurologic:   Motor: moves all extremities spontaneously and without difficulty, no abnormal involuntary movements observed  Gait: normal gait and station    Mental status examination:   Appearance: appears stated age, casually dressed, no acute distress  Behavior: calm and cooperative  Mood: "good"  Affect: mood congruent, euthymic  Thought process: linear and goal directed  Associations: appropriate for conversation  Thought content: no plan or desire for self harm or harm to others, denies paranoia, no delusional ideation volunteered  Perceptions: denies hallucinations or other altered perceptions  Orientation: oriented to day of week, month, year, location and situation  Language: English, fluid  Attention: able to attend to interview  Insight: good  Judgement: good    PHQ9:  Over the last two weeks how often have you been bothered by little interest or pleasure in doing things: 0  Over the last two weeks how often have you been bothered by feeling down, depressed or hopeless: 0  PHQ-2 Total Score: 0  PHQ-9 Score: 2  PHQ-9 Interpretation: Minimal or None        2/26/2024     8:28 AM 11/3/2023     8:42 AM 9/6/2023    11:09 AM   GAD7   1. Feeling nervous, anxious, or on edge? 1 3 3   2. Not being able to stop or control worrying? 1 3 3   3. Worrying too much about different things? 1 3 3   4. Trouble relaxing? 0 1 3   5. Being so restless that it is hard to sit still? 1 1 3   6. Becoming easily annoyed or irritable? 1 3 3   7. Feeling afraid as if something awful might happen? 1 1 3   8. If you checked off any problems, how difficult have these problems made it for you to do your work, take care of things at home, or get along " with other people? 1 2 3   FLORENCE-7 Score 6 15 21     Assessment and Diagnosis   Status/Progress: Based on the examination today, the patient's problem(s) is/are well controlled.  New problems have not been presented today.   Co-morbidities and Lack of compliance are not complicating management of the primary condition.  Number of separate conditions addressed during today's visit: 2 (mood well controlled, sleep fair control).  Are medication adjustments being made today: No.  Are referral(s) being ordered today: No.  Complexity (level) of medical decision making employed in the encounter: MODERATE.    General Impression:    ICD-10-CM ICD-9-CM   1. Moderate episode of recurrent major depressive disorder  F33.1 296.32   2. Insomnia, unspecified type  G47.00 780.52   3. FLORENCE (generalized anxiety disorder)  F41.1 300.02   4. PTSD (post-traumatic stress disorder)  F43.10 309.81     Intervention/Counseling/Treatment Plan   Continue trazodone 100-200mg nightly prn insomnia  Continue lamotrigine 150mg bid  Continue trintellix 20mg daily  No need for PEC as pt is not an imminent danger to self or others or gravely disabled due to acute psychiatric illness  Discussed that pt should either call clinic for psychiatric crisis symptoms or present to nearest emergency room    Discussed with patient informed consent including diagnosis, risks and benefits of proposed treatment above vs. alternative treatments vs. no treatment, as well as serious and common side effects of these treatments, and the inherent unpredictability of individual responses to these treatments. The patient expresses understanding of the above and displays the capacity to agree with this current plan. Patient also agrees that, currently, the benefits outweigh the risks and would like to pursue treatment at this time, and had no other questions.    Instructions:  Take all medications as prescribed.    Abstain from recreational drugs and alcohol.  Present to ED or  call 911 for SI/HI plan or intent, psychosis, or medical emergency.    Return to Clinic: Follow up in about 4 weeks (around 3/25/2024).    Total time: Total time spent with patient 15 minutes.     Damian Ward MD  [facility]

## 2024-03-12 ENCOUNTER — LAB VISIT (OUTPATIENT)
Dept: LAB | Facility: HOSPITAL | Age: 29
End: 2024-03-12
Attending: STUDENT IN AN ORGANIZED HEALTH CARE EDUCATION/TRAINING PROGRAM
Payer: MEDICAID

## 2024-03-12 DIAGNOSIS — Z34.02 ENCOUNTER FOR SUPERVISION OF NORMAL FIRST PREGNANCY IN SECOND TRIMESTER: Primary | ICD-10-CM

## 2024-03-12 LAB
ERYTHROCYTE [DISTWIDTH] IN BLOOD BY AUTOMATED COUNT: 13.3 % (ref 11.5–17)
GLUCOSE 1H P 100 G GLC PO SERPL-MCNC: 94 MG/DL (ref 100–180)
GROUP & RH: NORMAL
HCT VFR BLD AUTO: 31.7 % (ref 37–47)
HGB BLD-MCNC: 10.6 G/DL (ref 12–16)
HIV 1+2 AB+HIV1 P24 AG SERPL QL IA: NONREACTIVE
INDIRECT COOMBS: NORMAL
MCH RBC QN AUTO: 30.3 PG (ref 27–31)
MCHC RBC AUTO-ENTMCNC: 33.4 G/DL (ref 33–36)
MCV RBC AUTO: 90.6 FL (ref 80–94)
NRBC BLD AUTO-RTO: 0 %
PLATELET # BLD AUTO: 244 X10(3)/MCL (ref 130–400)
PMV BLD AUTO: 9.4 FL (ref 7.4–10.4)
RBC # BLD AUTO: 3.5 X10(6)/MCL (ref 4.2–5.4)
SPECIMEN OUTDATE: NORMAL
T PALLIDUM AB SER QL: NONREACTIVE
WBC # SPEC AUTO: 9.6 X10(3)/MCL (ref 4.5–11.5)

## 2024-03-12 PROCEDURE — 85027 COMPLETE CBC AUTOMATED: CPT

## 2024-03-12 PROCEDURE — 86780 TREPONEMA PALLIDUM: CPT

## 2024-03-12 PROCEDURE — 87389 HIV-1 AG W/HIV-1&-2 AB AG IA: CPT

## 2024-03-12 PROCEDURE — 82950 GLUCOSE TEST: CPT

## 2024-03-12 PROCEDURE — 86850 RBC ANTIBODY SCREEN: CPT | Performed by: STUDENT IN AN ORGANIZED HEALTH CARE EDUCATION/TRAINING PROGRAM

## 2024-03-12 PROCEDURE — 36415 COLL VENOUS BLD VENIPUNCTURE: CPT

## 2024-04-03 ENCOUNTER — OFFICE VISIT (OUTPATIENT)
Dept: BEHAVIORAL HEALTH | Facility: CLINIC | Age: 29
End: 2024-04-03
Payer: MEDICAID

## 2024-04-03 DIAGNOSIS — G47.00 INSOMNIA, UNSPECIFIED TYPE: ICD-10-CM

## 2024-04-03 DIAGNOSIS — F33.1 MODERATE EPISODE OF RECURRENT MAJOR DEPRESSIVE DISORDER: Chronic | ICD-10-CM

## 2024-04-03 DIAGNOSIS — G44.229 CHRONIC TENSION-TYPE HEADACHE, NOT INTRACTABLE: ICD-10-CM

## 2024-04-03 DIAGNOSIS — F43.10 PTSD (POST-TRAUMATIC STRESS DISORDER): Chronic | ICD-10-CM

## 2024-04-03 DIAGNOSIS — G43.009 MIGRAINE WITHOUT AURA AND WITHOUT STATUS MIGRAINOSUS, NOT INTRACTABLE: ICD-10-CM

## 2024-04-03 DIAGNOSIS — F41.1 GAD (GENERALIZED ANXIETY DISORDER): Chronic | ICD-10-CM

## 2024-04-03 PROCEDURE — 1160F RVW MEDS BY RX/DR IN RCRD: CPT | Mod: CPTII,NDTC,, | Performed by: STUDENT IN AN ORGANIZED HEALTH CARE EDUCATION/TRAINING PROGRAM

## 2024-04-03 PROCEDURE — 90833 PSYTX W PT W E/M 30 MIN: CPT | Mod: AF,HB,S$PBB,NDTC | Performed by: STUDENT IN AN ORGANIZED HEALTH CARE EDUCATION/TRAINING PROGRAM

## 2024-04-03 PROCEDURE — 99213 OFFICE O/P EST LOW 20 MIN: CPT | Mod: AF,HB,S$PBB,NDTC | Performed by: STUDENT IN AN ORGANIZED HEALTH CARE EDUCATION/TRAINING PROGRAM

## 2024-04-03 PROCEDURE — 1159F MED LIST DOCD IN RCRD: CPT | Mod: CPTII,NDTC,, | Performed by: STUDENT IN AN ORGANIZED HEALTH CARE EDUCATION/TRAINING PROGRAM

## 2024-04-03 RX ORDER — TRAZODONE HYDROCHLORIDE 100 MG/1
100-200 TABLET ORAL NIGHTLY PRN
Qty: 60 TABLET | Refills: 5 | Status: ON HOLD | OUTPATIENT
Start: 2024-04-03

## 2024-04-03 RX ORDER — LAMOTRIGINE 150 MG/1
150 TABLET ORAL 2 TIMES DAILY
Qty: 60 TABLET | Refills: 5 | Status: ON HOLD | OUTPATIENT
Start: 2024-04-03 | End: 2024-08-01

## 2024-04-03 NOTE — PROGRESS NOTES
Outpatient Psychiatry Follow-Up Visit    4/3/2024    Clinical Status of Patient:  Outpatient (Ambulatory)    Chief Complaint:  Salma Montero is a 28 y.o. female who presents today for follow-up of MDD, FLORENCE, PTSD, insomnia. Patient last seen for follow-up on 2/26/2024.  Now 28 weeks pregnant.  Met with patient.      Interval History and Content of Current Session:  Interim Events/Subjective Report/Content of Current Session:   Patient reports doing okay since last visit.  Feels extremely nervous about her capability as a perspective parent.  Says that her mother did not model good behaviors and patient worries about her ability to be a good mother in spite of this.  Mood is fair but occasionally down when she experiences negative ruminative thinking.  Sleep stable.  Appetite stable.  Denies hopelessness.  Denies plan or desire for self-harm or harm to others.  Denies side effects from current regimen.  Denies change in somatic complaints.  Patient happy with current regimen and wants to continue.    Review of Systems   PSYCHIATRIC: Pertinant items are noted in the narrative.  CONSTITUTIONAL: No weight gain or loss.   MUSCULOSKELETAL: No pain or stiffness of the joints.  NEUROLOGIC: No weakness, sensory changes, seizures, confusion, memory loss, tremor or other abnormal movements.    RESPIRATORY: No shortness of breath.  CARDIOVASCULAR: No tachycardia or chest pain.    GASTROINTESTINAL: denies nausea/vomiting    Past Medical, Family and Social History: The patient's past medical, family and social history have been reviewed and updated as appropriate within the electronic medical record - see encounter notes.    Compliance: good    Side effects: denies    Risk Parameters:  Patient reports no suicidal ideation  Patient reports no homicidal ideation  Patient reports no self-injurious behavior  Patient reports no violent behavior    Exam (detailed: at least 9 elements; comprehensive: all 15 elements)  "  Constitutional  Vitals:  Most recent vital signs, dated less than 90 days prior to this appointment, were reviewed.     There were no vitals filed for this visit.       General:   Constitutional: No acute distress, appears stated age, casually dressed    Neurologic:   Motor: moves all extremities spontaneously and without difficulty, no abnormal involuntary movements observed  Gait: [facility] 2/2 virtual visit    Mental status examination:   Appearance: appears stated age, casually dressed, no acute distress  Behavior: calm and cooperative  Mood: "ok"  Affect: mood congruent, euthymic  Thought process: linear and goal directed  Associations: appropriate for conversation  Thought content: no plan or desire for self harm or harm to others, denies paranoia, no delusional ideation volunteered  Perceptions: denies hallucinations or other altered perceptions  Orientation: oriented to day of week, month, year, location and situation  Language: English, fluid  Attention: able to attend to interview  Insight: good  Judgement: good    PHQ9:  Over the last two weeks how often have you been bothered by little interest or pleasure in doing things: 0  Over the last two weeks how often have you been bothered by feeling down, depressed or hopeless: 0  PHQ-2 Total Score: 0  PHQ-9 Score: 0  PHQ-9 Interpretation: Minimal or None        2/26/2024     8:28 AM 11/3/2023     8:42 AM 9/6/2023    11:09 AM   GAD7   1. Feeling nervous, anxious, or on edge? 1 3 3   2. Not being able to stop or control worrying? 1 3 3   3. Worrying too much about different things? 1 3 3   4. Trouble relaxing? 0 1 3   5. Being so restless that it is hard to sit still? 1 1 3   6. Becoming easily annoyed or irritable? 1 3 3   7. Feeling afraid as if something awful might happen? 1 1 3   8. If you checked off any problems, how difficult have these problems made it for you to do your work, take care of things at home, or get along with other people? 1 2 3   FLORENCE-7 Score " 6 15 21     PSYCHOTHERAPY ADD-ON +[id number]   16-37 minutes    Site: [facility]  Time: 16 minutes  Participants: Met with patient    Therapeutic Intervention Type: supportive/cognitive psychotherapy  Why chosen therapy is appropriate versus another modality: relevant to diagnosis    Target symptoms: depression, anxiety   Primary focus:  Discussed patient's anxiety about becoming a new mother.    Psychotherapeutic techniques: Discussed patient's anxiety about becoming a new mother.  Patient feels that she will have difficulty due to her mother not mottling good behaviors.  Some difficulty due to seeing herself as a mentally ill person, feels that her child will be disappointed in her.  Gently challenged some of patient's cognitive distortions.  Provided psychoeducation on positive affirmations and recommended patient utilize this technique.  Also discussed gratitudes.  Empathic communication, reflective listening, and motivational interviewing techniques utilized throughout.    Outcome monitoring methods: self-report    Patient's response to intervention:  The patient's response to intervention is accepting.    Progress toward goals:  The patient's progress toward goals is fair .    Assessment and Diagnosis   Status/Progress: Based on the examination today, the patient's problem(s) is/are adequately but not ideally controlled.  New problems have not been presented today.   Co-morbidities and Lack of compliance are not complicating management of the primary condition.  Number of separate conditions addressed during today's visit: 2 (mood stable, anxiety stable).  Are medication adjustments being made today: No.  Are referral(s) being ordered today: No.  Complexity (level) of medical decision making employed in the encounter: HIGH.    General Impression:    ICD-10-CM ICD-9-CM   1. Moderate episode of recurrent major depressive disorder  F33.1 296.32   2. Insomnia, unspecified type  G47.00 780.52   3.  Chronic tension-type headache, not intractable  G44.229 339.12   4. Migraine without aura and without status migrainosus, not intractable  G43.009 346.10   5. FLORENCE (generalized anxiety disorder)  F41.1 300.02   6. PTSD (post-traumatic stress disorder)  F43.10 309.81       Intervention/Counseling/Treatment Plan   Continue trazodone 100-200mg nightly prn insomnia  Continue lamotrigine 150mg bid  Continue trintellix 20mg daily  No need for PEC as pt is not an imminent danger to self or others or gravely disabled due to acute psychiatric illness  Discussed that pt should either call clinic for psychiatric crisis symptoms or present to nearest emergency room    Discussed with patient informed consent including diagnosis, risks and benefits of proposed treatment above vs. alternative treatments vs. no treatment, as well as serious and common side effects of these treatments, and the inherent unpredictability of individual responses to these treatments. The patient expresses understanding of the above and displays the capacity to agree with this current plan. Patient also agrees that, currently, the benefits outweigh the risks and would like to pursue treatment at this time, and had no other questions.    Instructions:  Take all medications as prescribed.    Abstain from recreational drugs and alcohol.  Present to ED or call 911 for SI/HI plan or intent, psychosis, or medical emergency.    Return to Clinic: No follow-ups on file.    Total time: Total time spent with patient 32 minutes (with 16 minutes spent exclusively on psychotherapy).     Damian Ward MD  [facility]

## 2024-05-02 DIAGNOSIS — O99.212 OBESITY AFFECTING PREGNANCY IN SECOND TRIMESTER, UNSPECIFIED OBESITY TYPE: Primary | ICD-10-CM

## 2024-05-02 DIAGNOSIS — O35.5XX0: ICD-10-CM

## 2024-05-05 PROBLEM — O99.213 OBESITY AFFECTING PREGNANCY IN THIRD TRIMESTER: Status: ACTIVE | Noted: 2022-06-09

## 2024-05-06 ENCOUNTER — PROCEDURE VISIT (OUTPATIENT)
Dept: MATERNAL FETAL MEDICINE | Facility: CLINIC | Age: 29
End: 2024-05-06
Payer: MEDICAID

## 2024-05-06 ENCOUNTER — LAB VISIT (OUTPATIENT)
Dept: LAB | Facility: HOSPITAL | Age: 29
End: 2024-05-06
Attending: OBSTETRICS & GYNECOLOGY
Payer: MEDICAID

## 2024-05-06 ENCOUNTER — OFFICE VISIT (OUTPATIENT)
Dept: MATERNAL FETAL MEDICINE | Facility: CLINIC | Age: 29
End: 2024-05-06
Payer: MEDICAID

## 2024-05-06 VITALS
BODY MASS INDEX: 35.94 KG/M2 | HEIGHT: 66 IN | WEIGHT: 223.63 LBS | HEART RATE: 80 BPM | SYSTOLIC BLOOD PRESSURE: 132 MMHG | DIASTOLIC BLOOD PRESSURE: 88 MMHG

## 2024-05-06 DIAGNOSIS — L29.9 PRURITUS OF PREGNANCY IN THIRD TRIMESTER: ICD-10-CM

## 2024-05-06 DIAGNOSIS — Z87.59 H/O MIGRAINE DURING PREGNANCY: Primary | ICD-10-CM

## 2024-05-06 DIAGNOSIS — O26.03 EXCESSIVE WEIGHT GAIN IN PREGNANCY IN THIRD TRIMESTER: ICD-10-CM

## 2024-05-06 DIAGNOSIS — O99.212 OBESITY AFFECTING PREGNANCY IN SECOND TRIMESTER, UNSPECIFIED OBESITY TYPE: ICD-10-CM

## 2024-05-06 DIAGNOSIS — Z86.69 H/O MIGRAINE DURING PREGNANCY: Primary | ICD-10-CM

## 2024-05-06 DIAGNOSIS — F43.10 PTSD (POST-TRAUMATIC STRESS DISORDER): Chronic | ICD-10-CM

## 2024-05-06 DIAGNOSIS — O99.213 OBESITY AFFECTING PREGNANCY IN THIRD TRIMESTER, UNSPECIFIED OBESITY TYPE: ICD-10-CM

## 2024-05-06 DIAGNOSIS — O35.5XX0: ICD-10-CM

## 2024-05-06 DIAGNOSIS — O09.90 AT HIGH RISK FOR COMPLICATIONS OF INTRAUTERINE PREGNANCY (IUP): ICD-10-CM

## 2024-05-06 DIAGNOSIS — O99.713 PRURITUS OF PREGNANCY IN THIRD TRIMESTER: ICD-10-CM

## 2024-05-06 DIAGNOSIS — R00.2 HEART PALPITATIONS: Chronic | ICD-10-CM

## 2024-05-06 LAB
ALT SERPL-CCNC: 11 UNIT/L (ref 0–55)
AST SERPL-CCNC: 17 UNIT/L (ref 5–34)

## 2024-05-06 PROCEDURE — 84450 TRANSFERASE (AST) (SGOT): CPT

## 2024-05-06 PROCEDURE — 84460 ALANINE AMINO (ALT) (SGPT): CPT

## 2024-05-06 PROCEDURE — 3008F BODY MASS INDEX DOCD: CPT | Mod: CPTII,S$GLB,, | Performed by: OBSTETRICS & GYNECOLOGY

## 2024-05-06 PROCEDURE — 76819 FETAL BIOPHYS PROFIL W/O NST: CPT | Mod: S$GLB,,, | Performed by: OBSTETRICS & GYNECOLOGY

## 2024-05-06 PROCEDURE — 99213 OFFICE O/P EST LOW 20 MIN: CPT | Mod: 25,TH,S$GLB, | Performed by: OBSTETRICS & GYNECOLOGY

## 2024-05-06 PROCEDURE — 76816 OB US FOLLOW-UP PER FETUS: CPT | Mod: S$GLB,,, | Performed by: OBSTETRICS & GYNECOLOGY

## 2024-05-06 PROCEDURE — 82542 COL CHROMOTOGRAPHY QUAL/QUAN: CPT

## 2024-05-06 PROCEDURE — 1160F RVW MEDS BY RX/DR IN RCRD: CPT | Mod: CPTII,S$GLB,, | Performed by: OBSTETRICS & GYNECOLOGY

## 2024-05-06 PROCEDURE — 36415 COLL VENOUS BLD VENIPUNCTURE: CPT

## 2024-05-06 PROCEDURE — 3079F DIAST BP 80-89 MM HG: CPT | Mod: CPTII,S$GLB,, | Performed by: OBSTETRICS & GYNECOLOGY

## 2024-05-06 PROCEDURE — 1159F MED LIST DOCD IN RCRD: CPT | Mod: CPTII,S$GLB,, | Performed by: OBSTETRICS & GYNECOLOGY

## 2024-05-06 PROCEDURE — 3075F SYST BP GE 130 - 139MM HG: CPT | Mod: CPTII,S$GLB,, | Performed by: OBSTETRICS & GYNECOLOGY

## 2024-05-06 NOTE — PROGRESS NOTES
Maternal Fetal Medicine Follow Up    Subjective     Patient ID: [id number]    Chief Complaint: mfm followup w/us (Pt has been having bouts of itching. Last night her whole body was itching.)      HPI: Salma Montero is a 29 y.o. female  at 32w2d gestation with Estimated Date of Delivery: 24  who is here for follow-up consultation by M.    She has history of PTSD and bipolar disorder diagnosed in .  She is currently on Lamictal 150 mg twice daily, trazodone 100-200 mg as needed nightly, and Trintellix 20 mg daily.  She is also on folic acid 1 mg daily.  She is history of migraine headaches and used to take sumatriptan as needed which was discontinued when she became pregnant. She reports the Lamictal also helps with her migraines. She has history of vitamin-D deficiency and is on 2000 IU of vitamin-D daily.  She has history of palpitations that occur irregularly.  She has been on metoprolol XL 50 mg daily since 2023. On 2023, TSH was 2.145 and free T4 was 1.33.  She saw cardiology on 2024 and had uneventful Holter monitor, normal EKG, and normal exercise stress test.  She had increased BMI of 32.4 at initial MFM consultation visit.  She is on low-dose aspirin daily.  Patient was accompanied by her mother      Patient that is reported having diffuse itching yesterday in the torso and extremities and had a similar episode few days ago.       Interval history since last MFM visit: None.. She denies any leaking fluid, vaginal bleeding, contractions, decreased fetal movement. Denies headaches, visual disturbances, or epigastric pain.    Pregnancy complications include:   Patient Active Problem List   Diagnosis    Obesity affecting pregnancy in third trimester    Moderate episode of recurrent major depressive disorder    FLORENCE (generalized anxiety disorder)    Gastroesophageal reflux disease    Heart palpitations    PTSD (post-traumatic stress disorder)    Migraine without aura and  without status migrainosus, not intractable    Orthostatic dizziness    Nausea and vomiting during pregnancy    Chronic tension-type headache, not intractable    H/O migraine during pregnancy    Pain of round ligament affecting pregnancy, antepartum    Constipation during pregnancy in second trimester    At high risk for complications of intrauterine pregnancy (IUP)    Excessive weight gain in pregnancy in second trimester        No changes to medical, surgical, family, social, or obstetric history.    Medications:  Current Outpatient Medications   Medication Instructions    aspirin (ECOTRIN) 162 mg, Oral, Daily, Start at 12 weeks gestation.  At 36 weeks gestation, decrease to one 81mg tablet daily.    cetirizine (ALLER-VALE) 10 MG tablet 1 tablet, Oral, Daily    cholecalciferol (vitamin D3) (VITAMIN D3) 50 mcg, Oral, Daily    famotidine-calcium carbonate-magnesium hydroxide (PEPCID COMPLETE) -165 mg 1 tablet, Oral, Daily PRN    folic acid (FOLVITE) 1 mg, Oral, Daily    inulin-chromium picolinate (FIBER SELECT GUMMIES) 2-100 gram-mcg Chew 2 tablets, Oral, Daily    lamoTRIgine (LAMICTAL) 150 mg, Oral, 2 times daily    metoclopramide HCl (REGLAN) 10 mg, Oral, 3 times daily with meals    metoprolol succinate (TOPROL-XL) 50 mg, Oral, Nightly    ondansetron (ZOFRAN-ODT) 4 mg, Oral, Every 6 hours PRN    pantoprazole (PROTONIX) 40 mg, Oral, 2 times daily    promethazine (PHENERGAN) 25 mg, Rectal, Every 6 hours PRN    scopolamine (TRANSDERM-SCOP) 1.3-1.5 mg (1 mg over 3 days) 1 patch, Transdermal, Every 72 hours    sumatriptan (IMITREX) 100 mg, Oral, Every 2 hours PRN    traZODone (DESYREL) 100-200 mg, Oral, Nightly PRN    vortioxetine (TRINTELLIX) 20 mg, Oral, Daily       Review of Systems   12 point review of systems conducted, negative except as stated in the history of present illness. See HPI for details.      Objective     Visit Vitals  /88 (BP Location: Left arm, Patient Position: Sitting, BP Method: Large  "(Automatic))   Pulse 80   Ht 5' 6" (1.676 m)   Wt 101.4 kg (223 lb 9.6 oz)   LMP 2023 (Exact Date)   BMI 36.09 kg/m²        Physical Exam  Vitals and nursing note reviewed.   Constitutional:       Appearance: Normal appearance.      Comments: Increased BMI   HENT:      Head: Normocephalic and atraumatic.      Nose: Nose normal. No congestion.   Cardiovascular:      Rate and Rhythm: Normal rate.   Pulmonary:      Effort: Pulmonary effort is normal.   Skin:     Findings: No rash.   Neurological:      Mental Status: She is alert and oriented to person, place, and time.   Psychiatric:         Mood and Affect: Mood normal.         Behavior: Behavior normal.         Thought Content: Thought content normal.         Judgment: Judgment normal.         ASSESSMENT/PLAN:     29 y.o.  female with IUP at 32w2d    History of PTSD, on multiple psychiatric medications  There is normal fetal growth with an EFW of 1969 g at the 29% and the AC at the 64% on 2024 bpp 8    AFV is normal.      ACOG recommends that therapy for mental health disorders during pregnancy be individualized. Treatment of anxiety and depression should incorporate the clinical expertise of her mental health clinician, her obstetrician, her primary care provider, and her pediatrician. The risks of untreated depression and the benefits of treatment must be weighed against the risks associated with the use of psychiatric medications during pregnancy.    Continued consultation with mental health provider is very helpful in coordinating care. Patient will continue  Lamictal 150 mg twice daily, trazodone 100-200 mg as needed nightly, and Trintellix 20 mg daily.  She is also on folic acid 1 mg daily.  Advised to follow with mental health provider and advised her to report any worsening symptoms or SI/HI at anytime.       History of migraines  Relief from lamictal.   Advised her to report any worsening and continue follow-up with " neurology.      Increased BMI in pregnancy with a excessive weight  Body mass index is 36.09 kg/m². With excessive weight gain from last visit, 13 lbs in 2 , she was advised to decrease caloric intake and was reminded of the importance of avoiding excessive weight gain.  Excess weight gain would be associated with gestational hypertension, gestational diabetes and adverse  outcomes, including fetal demise in utero.    Reviewed importance of FKC 3/day and prn with instructions to immediately report any decreased fetal movement.    It is important to lose weight after the pregnancy is over, especially before a future pregnancy. Breastfeeding may be an important tool in reducing the postpartum weight retention. Fetal risks were discussed with short term risk of fetal/ obesity and long term risk of adolescent component of metabolic syndrome.      At high risk for preeclampsia  With her increased risk for preeclampsia, she agreed to continue asa 81 mg daily until delivery.. Preeclampsia precautions reviewed.       Vitamin-D deficiency  It is suggested to monitor levels throughout pregnancy for accurate titration of dosage.  She was advised to continue vitamin-D 2000 IU daily.      Palpitations/tachycardia  History of tachycardia/palpitations, controlled with metoprolol XL 50 mg daily. Reviewed risks with tachycardia in pregnancy, including risks for recurrence of tachycardia. With some beta-blockers associated with fetal growth restriction, repeat fetal growth ultrasound done today.     Advised patient to avoid caffeine/dehydration, reduces stress, and eat a balanced, healthy diet.     There is no evidence of fetal growth restriction we will continue taking metoprolol XL 50 daily.    Recommend continue followup with cardiologist as scheduled.    Pruritus in pregnancy    Emphasized the importance of checking for cholestasis of pregnancy.  Order the blood test.  If elevated bile acid then we would like to  see the patient with Dr. Cope twice a week till delivery.  If bile acids are normal supportive care measures were discussed and she will follow with Dr. Hay.      With normal fetal growth at this time we will sign off on her care we will continue regular follow-up with Dr. Hay.      No follow-ups on file.     Future Appointments   Date Time Provider Department Center   5/7/2024  8:45 AM Rico Hay MD [facility] [place] [facility]   5/20/2024 12:45 PM Damian Ward MD [facility]   5/21/2024  8:45 AM Rico Hay MD [facility] [place] [facility]   6/4/2024  8:20 AM Rico Hay MD [facility] [place] [facility]   6/11/2024  9:00 AM Rico Hay MD [facility] [place] [facility]   6/19/2024 11:30 AM Noris Novoa PA [facility] [place] [facility]   6/27/2024  3:20 PM Rico Hay MD [facility] [place] [facility]   7/22/2024 10:45 AM Idalia Alegria PA-C [facility] CARD [place] [facility]   8/13/2024  2:00 PM Daly Colon MD [facility] NEURO [place] [facility]        Patient was evaluated and examined by Dr. Winston. RIKKI Danielle, helped in pre charting of part of note.      This note was created with the assistance of [employer] voice recognition software. There may be transcription errors as a result of using this technology, however minimal. Effort has been made to ensure accuracy of transcription, but any obvious errors or omissions should be clarified with the author of the document.

## 2024-05-08 LAB
BILE AC SERPL-SCNC: 0.7 NMOL/ML
CDCAE SERPL-SCNC: 0.27 NMOL/ML
CHOLATE SERPL-SCNC: 0.07 NMOL/ML
DO-CHOLATE SERPL-SCNC: 0.17 NMOL/ML
URSODEOXYCHOLATE SERPL-SCNC: 0.19 NMOL/ML

## 2024-05-16 NOTE — ASSESSMENT & PLAN NOTE
H/o heart palpitations, syncope, dizziness. See EKG/ Holter/ Echo results. Referred to Cardiology January 2022- no appointment noted.   No previous uterine incision/Cerda Pregnancy/Less than or equal to 4 previous vaginal births/No history of postpartum hemorrhage

## 2024-05-20 ENCOUNTER — OFFICE VISIT (OUTPATIENT)
Dept: BEHAVIORAL HEALTH | Facility: CLINIC | Age: 29
End: 2024-05-20
Payer: MEDICAID

## 2024-05-20 DIAGNOSIS — F43.10 PTSD (POST-TRAUMATIC STRESS DISORDER): Chronic | ICD-10-CM

## 2024-05-20 DIAGNOSIS — F41.1 GAD (GENERALIZED ANXIETY DISORDER): Chronic | ICD-10-CM

## 2024-05-20 DIAGNOSIS — F33.1 MODERATE EPISODE OF RECURRENT MAJOR DEPRESSIVE DISORDER: Chronic | ICD-10-CM

## 2024-05-20 PROCEDURE — 99213 OFFICE O/P EST LOW 20 MIN: CPT | Mod: AF,HB,95, | Performed by: STUDENT IN AN ORGANIZED HEALTH CARE EDUCATION/TRAINING PROGRAM

## 2024-05-20 PROCEDURE — 1160F RVW MEDS BY RX/DR IN RCRD: CPT | Mod: CPTII,95,, | Performed by: STUDENT IN AN ORGANIZED HEALTH CARE EDUCATION/TRAINING PROGRAM

## 2024-05-20 PROCEDURE — 1159F MED LIST DOCD IN RCRD: CPT | Mod: CPTII,95,, | Performed by: STUDENT IN AN ORGANIZED HEALTH CARE EDUCATION/TRAINING PROGRAM

## 2024-05-20 NOTE — PROGRESS NOTES
"Outpatient Psychiatry Follow-Up Visit    5/20/2024    Clinical Status of Patient:  Outpatient (Ambulatory)    Chief Complaint:  Salma Montero is a 29 y.o. female who presents today for follow-up of MDD, FLORENCE, PTSD, insomnia. Patient last seen for follow-up on 4/3/2024.  Now 35 weeks pregnant.  Met with patient.      TELE PSYCHIATRY Disclaimer   *The patient was informed despite using HIPPA compliant technology there may be risks including security breach, technological failure, inability to perform a comprehensive physical exam which could delay or prevent an accurate diagnosis, and potential complications from treatment decisions rendered over a telemedical platform.   The patient was also informed of the relationship between the physician and patient and the respective role of any other health care provider with respect to management of the patient; and notified that the pt may decline to receive medical services by telemedicine and may withdraw from such care at any time.     Patient's Current location: [address] [place] [address]     In Case of Emergency pts next of kin  Name:Fer Vigil  Phone number:  [phone]   Visit type: Virtual visit with synchronous audio and video  Total time spent with patient: 20 minutes    Interval History and Content of Current Session:  Interim Events/Subjective Report/Content of Current Session:   Pt reports doing "pretty good"  overall.  Overall mood and anxiety symptoms are well controlled.  Still struggling with occasional thoughts that she would be a good parent due to her mental health history.  Has discussed with her partner who is supportive of her.  Notes her pregnancy is progressing well and that she is due in about 4 weeks.  Overall pt happy with current treatment and wants to continue.     Review of Systems   PSYCHIATRIC: Pertinant items are noted in the narrative.  CONSTITUTIONAL: No weight gain or loss.   MUSCULOSKELETAL: No pain or stiffness of the " "joints.  NEUROLOGIC: No weakness, sensory changes, seizures, confusion, memory loss, tremor or other abnormal movements.    RESPIRATORY: No shortness of breath.  CARDIOVASCULAR: No tachycardia or chest pain.    GASTROINTESTINAL: denies nausea/vomiting    Past Medical, Family and Social History: The patient's past medical, family and social history have been reviewed and updated as appropriate within the electronic medical record - see encounter notes.    Compliance: good    Side effects: denies    Risk Parameters:  Patient reports no suicidal ideation  Patient reports no homicidal ideation  Patient reports no self-injurious behavior  Patient reports no violent behavior    Exam (detailed: at least 9 elements; comprehensive: all 15 elements)   Constitutional  Vitals:  Most recent vital signs, dated less than 90 days prior to this appointment, were reviewed.     There were no vitals filed for this visit.       General:   Constitutional: No acute distress, appears stated age, casually dressed    Neurologic:   Motor: moves all extremities spontaneously and without difficulty, no abnormal involuntary movements observed  Gait: [facility] 2/2 virtual visit    Mental status examination:   Appearance: appears stated age, casually dressed, no acute distress  Behavior: calm and cooperative  Mood: "good"  Affect: mood congruent, euthymic  Thought process: linear and goal directed  Associations: appropriate for conversation  Thought content: no plan or desire for self harm or harm to others, denies paranoia, no delusional ideation volunteered  Perceptions: denies hallucinations or other altered perceptions  Orientation: oriented to day of week, month, year, location and situation  Language: English, fluid  Attention: able to attend to interview  Insight: good  Judgement: good    PHQ9:  Over the last two weeks how often have you been bothered by little interest or pleasure in doing things: 0  Over the last two weeks how often have you " been bothered by feeling down, depressed or hopeless: 1  PHQ-2 Total Score: 1  PHQ-9 Score: 4  PHQ-9 Interpretation: Minimal or None        2/26/2024     8:28 AM 11/3/2023     8:42 AM 9/6/2023    11:09 AM   GAD7   1. Feeling nervous, anxious, or on edge? 1 3 3   2. Not being able to stop or control worrying? 1 3 3   3. Worrying too much about different things? 1 3 3   4. Trouble relaxing? 0 1 3   5. Being so restless that it is hard to sit still? 1 1 3   6. Becoming easily annoyed or irritable? 1 3 3   7. Feeling afraid as if something awful might happen? 1 1 3   8. If you checked off any problems, how difficult have these problems made it for you to do your work, take care of things at home, or get along with other people? 1 2 3   FLORENCE-7 Score 6 15 21     Assessment and Diagnosis   Status/Progress: Based on the examination today, the patient's problem(s) is/are well controlled.  New problems have not been presented today.   Co-morbidities and Lack of compliance are not complicating management of the primary condition.  Number of separate conditions addressed during today's visit: 2 (mood stable, anxiety stable).  Are medication adjustments being made today: No.  Are referral(s) being ordered today: No.  Complexity (level) of medical decision making employed in the encounter: HIGH.    General Impression:    ICD-10-CM ICD-9-CM   1. Moderate episode of recurrent major depressive disorder  F33.1 296.32   2. FLORENCE (generalized anxiety disorder)  F41.1 300.02   3. PTSD (post-traumatic stress disorder)  F43.10 309.81     Intervention/Counseling/Treatment Plan   Continue trazodone 100-200mg nightly prn insomnia  Continue lamotrigine 150mg bid  Continue trintellix 20mg daily  No need for PEC as pt is not an imminent danger to self or others or gravely disabled due to acute psychiatric illness  Discussed that pt should either call clinic for psychiatric crisis symptoms or present to nearest emergency room    Discussed with  patient informed consent including diagnosis, risks and benefits of proposed treatment above vs. alternative treatments vs. no treatment, as well as serious and common side effects of these treatments, and the inherent unpredictability of individual responses to these treatments. The patient expresses understanding of the above and displays the capacity to agree with this current plan. Patient also agrees that, currently, the benefits outweigh the risks and would like to pursue treatment at this time, and had no other questions.    Instructions:  Take all medications as prescribed.    Abstain from recreational drugs and alcohol.  Present to ED or call 911 for SI/HI plan or intent, psychosis, or medical emergency.    Return to Clinic: Follow up in about 4 weeks (around 6/17/2024).    Total time: Total time spent with patient 22 minutes.     Damian Ward MD  [facility]

## 2024-06-06 DIAGNOSIS — F33.1 MODERATE EPISODE OF RECURRENT MAJOR DEPRESSIVE DISORDER: ICD-10-CM

## 2024-06-06 LAB — PRENATAL STREP B CULTURE: NORMAL

## 2024-06-07 RX ORDER — FOLIC ACID 1 MG/1
1 TABLET ORAL
Qty: 30 TABLET | Refills: 4 | Status: ON HOLD | OUTPATIENT
Start: 2024-06-07

## 2024-06-10 DIAGNOSIS — O99.213 OBESITY AFFECTING PREGNANCY IN THIRD TRIMESTER, UNSPECIFIED OBESITY TYPE: Primary | ICD-10-CM

## 2024-06-11 PROBLEM — L29.9 PRURITUS OF PREGNANCY IN THIRD TRIMESTER: Status: RESOLVED | Noted: 2024-05-06 | Resolved: 2024-06-11

## 2024-06-11 PROBLEM — O99.343 MENTAL DISORDER AFFECTING PREGNANCY IN THIRD TRIMESTER: Status: ACTIVE | Noted: 2024-06-11

## 2024-06-11 PROBLEM — O26.899 PAIN OF ROUND LIGAMENT AFFECTING PREGNANCY, ANTEPARTUM: Status: RESOLVED | Noted: 2024-01-29 | Resolved: 2024-06-11

## 2024-06-11 PROBLEM — O28.3 ABNORMAL PRENATAL ULTRASOUND: Status: ACTIVE | Noted: 2024-06-11

## 2024-06-11 PROBLEM — O99.713 PRURITUS OF PREGNANCY IN THIRD TRIMESTER: Status: RESOLVED | Noted: 2024-05-06 | Resolved: 2024-06-11

## 2024-06-11 PROBLEM — R10.2 PAIN OF ROUND LIGAMENT AFFECTING PREGNANCY, ANTEPARTUM: Status: RESOLVED | Noted: 2024-01-29 | Resolved: 2024-06-11

## 2024-06-11 NOTE — PROGRESS NOTES
Maternal Fetal Medicine Follow Up    Subjective     Patient ID: [id number]    Chief Complaint: M follow up with US (PRATIMA valentine.  )      HPI: Salma Montero is a 29 y.o. female  at 37w4d gestation with Estimated Date of Delivery: 24  who is here for follow-up consultation by M.    She has history of PTSD and bipolar disorder diagnosed in .  She is currently on Lamictal 150 mg twice daily, trazodone 100-200 mg as needed nightly, and Trintellix 20 mg daily.  She is also on folic acid 1 mg daily.  She is history of migraine headaches and used to take sumatriptan as needed which was discontinued when she became pregnant. She reports the Lamictal also helps with her migraines. She has history of vitamin-D deficiency and is on 2000 IU of vitamin-D daily.  She has history of palpitations that occur irregularly.  She has been on metoprolol XL 50 mg daily since 2023. On 2023, TSH was 2.145 and free T4 was 1.33.  She saw cardiology on 2024 and had uneventful Holter monitor, normal EKG, and normal exercise stress test.  She had increased BMI of 32.4 at initial MFM consultation visit.  She is on low-dose aspirin daily.  Patient complained of some diffuse itching at last visit, for which labs were done on 2024 with AST 17, ALT 11, bile acid 0.7.  She recently had new concern for head circumference lagging behind on outside ultrasound.       Interval history since last M visit: None.. She denies any leaking fluid, vaginal bleeding, contractions, decreased fetal movement. Denies headaches, visual disturbances, or epigastric pain.    Pregnancy complications include:   Patient Active Problem List   Diagnosis    Increased BMI affecting pregnancy in third trimester    Moderate episode of recurrent major depressive disorder    FLORENCE (generalized anxiety disorder)    Gastroesophageal reflux disease    Heart palpitations    PTSD (post-traumatic stress disorder)    Migraine without aura and  without status migrainosus, not intractable    Orthostatic dizziness    Nausea and vomiting during pregnancy    Chronic tension-type headache, not intractable    H/O migraine during pregnancy    Constipation during pregnancy in second trimester    At high risk for complications of intrauterine pregnancy (IUP)    Excessive weight gain in pregnancy in third trimester    Pruritus of pregnancy in third trimester    Abnormal prenatal ultrasound    Mental disorder affecting pregnancy in third trimester        No changes to medical, surgical, family, social, or obstetric history.    Medications:  Current Outpatient Medications   Medication Instructions    aspirin (ECOTRIN) 162 mg, Oral, Daily, Start at 12 weeks gestation.  At 36 weeks gestation, decrease to one 81mg tablet daily.    cetirizine (ALLER-VALE) 10 MG tablet 1 tablet, Oral, Daily    cholecalciferol (vitamin D3) (VITAMIN D3) 50 mcg, Oral, Daily    famotidine-calcium carbonate-magnesium hydroxide (PEPCID COMPLETE) -165 mg 1 tablet, Oral, Daily PRN    folic acid (FOLVITE) 1 mg, Oral    inulin-chromium picolinate (FIBER SELECT GUMMIES) 2-100 gram-mcg Chew 2 tablets, Oral, Daily    lamoTRIgine (LAMICTAL) 150 mg, Oral, 2 times daily    metoclopramide HCl (REGLAN) 10 mg, Oral, 3 times daily with meals    metoprolol succinate (TOPROL-XL) 50 mg, Oral, Nightly    ondansetron (ZOFRAN-ODT) 4 mg, Oral, Every 6 hours PRN    pantoprazole (PROTONIX) 40 mg, Oral, 2 times daily    promethazine (PHENERGAN) 25 mg, Rectal, Every 6 hours PRN    scopolamine (TRANSDERM-SCOP) 1.3-1.5 mg (1 mg over 3 days) 1 patch, Transdermal, Every 72 hours    sumatriptan (IMITREX) 100 mg, Oral, Every 2 hours PRN    traZODone (DESYREL) 100-200 mg, Oral, Nightly PRN    vortioxetine (TRINTELLIX) 20 mg, Oral, Daily       Review of Systems   12 point review of systems conducted, negative except as stated in the history of present illness. See HPI for details.      Objective     Visit Vitals  /84  "(BP Location: Left arm, Patient Position: Sitting, BP Method: Large (Automatic))   Pulse 91   Ht 5' 6" (1.676 m)   Wt 97.5 kg (215 lb)   LMP 2023 (Exact Date)   BMI 34.70 kg/m²        Physical Exam  Vitals and nursing note reviewed.   Constitutional:       Appearance: Normal appearance.      Comments: Increased BMI   HENT:      Head: Normocephalic and atraumatic.      Nose: Nose normal. No congestion.   Cardiovascular:      Rate and Rhythm: Normal rate.   Pulmonary:      Effort: Pulmonary effort is normal.   Skin:     Findings: No rash.   Neurological:      Mental Status: She is alert and oriented to person, place, and time.   Psychiatric:         Mood and Affect: Mood normal.         Behavior: Behavior normal.         Thought Content: Thought content normal.         Judgment: Judgment normal.         ASSESSMENT/PLAN:     29 y.o.  female with IUP at 37w4d    History of PTSD, on multiple psychiatric medications  There is normal fetal growth with an EFW of 2825 g at the 20% and the AC at the 38% on 2024.  AFV is normal. BPP is 8/8 today (2024).     ACOG recommends that therapy for mental health disorders during pregnancy be individualized. Treatment of anxiety and depression should incorporate the clinical expertise of her mental health clinician, her obstetrician, her primary care provider, and her pediatrician. The risks of untreated depression and the benefits of treatment must be weighed against the risks associated with the use of psychiatric medications during pregnancy.    Continued consultation with mental health provider is very helpful in coordinating care. Patient will continue  Lamictal 150 mg twice daily, trazodone 100-200 mg as needed nightly, and Trintellix 20 mg daily.  She is also on folic acid 1 mg daily.  Advised to follow with mental health provider and advised her to report any worsening symptoms or SI/HI at anytime.       Concern for HC lagging behind  Head circumference " measures 34 weeks 6 days, a little less than 3 weeks behind.  Overall there is normal fetal growth.  With no other findings suspect nonspecific finding and recommend routine  evaluation.  Fetal kick count instructions reviewed.      History of migraines  Relief from lamictal. Advised her to report any worsening and continue follow-up with neurology.      Increased BMI in pregnancy with excessive weight gain  Body mass index is 34.7 kg/m². With stable weight since last visit., she was advised to continue healthy and low caloric diet.  Excess weight gain would be associated with gestational hypertension, gestational diabetes and adverse  outcomes, including fetal demise in utero.    Reviewed importance of FKC 3/day and prn with instructions to immediately report any decreased fetal movement.    It is important to lose weight after the pregnancy is over, especially before a future pregnancy. Breastfeeding may be an important tool in reducing the postpartum weight retention. Fetal risks were discussed with short term risk of fetal/ obesity and long term risk of adolescent component of metabolic syndrome.      At high risk for preeclampsia  With her increased risk for preeclampsia, she agreed to continue asa 81 mg daily until delivery.. Preeclampsia precautions reviewed.       Vitamin-D deficiency  It is suggested to monitor levels throughout pregnancy for accurate titration of dosage.  She was advised to continue vitamin-D 2000 IU daily.      Palpitations/tachycardia  History of tachycardia/palpitations, controlled with metoprolol XL 50 mg daily. Reviewed risks with tachycardia in pregnancy, including risks for recurrence of tachycardia. With some beta-blockers associated with fetal growth restriction, repeat fetal growth ultrasound done today.     Advised patient to avoid caffeine/dehydration, reduces stress, and eat a balanced, healthy diet.     There is no evidence of fetal growth restriction  we will continue taking metoprolol XL 50 daily.    Recommend continue followup with cardiologist as scheduled.      Pruritus in pregnancy  She had normal LFTs and bile acids.  Supportive care measures reviewed.  If does not improve, consider evaluation by dermatology.    there is normal fetal growth at this time with no definite abnormalities.  discussed with patient potential causes of microcephaly including viral infections, chromosomal abnormalities, and syndromes.  However 2 weeks behind is considered with a normal variant.  The possibility of such abnormalities are very unlikely but possible.  Regular  evaluation is recommended.  Fetal kick count instructions    Follow up for None. Keep follow up with primary OB.     Future Appointments   Date Time Provider Department Center   2024 11:30 AM Noris Novoa PA [facility] Ob[facility]   2024  3:20 PM Rico Hay MD [facility] Ob   2024 10:45 AM Idalia Alegria PA-C [facility] CARD [place]    2024  2:00 PM Daly Colon MD [facility] NEURO [facility]      Patient was evaluated and examined by Dr. Winston. CHICHI Zepeda, helped in pre charting of part of note.    This note was created with the assistance of nooked voice recognition software. There may be transcription errors as a result of using this technology, however minimal. Effort has been made to ensure accuracy of transcription, but any obvious errors or omissions should be clarified with the author of the document.

## 2024-06-12 ENCOUNTER — OFFICE VISIT (OUTPATIENT)
Dept: MATERNAL FETAL MEDICINE | Facility: CLINIC | Age: 29
End: 2024-06-12
Payer: COMMERCIAL

## 2024-06-12 ENCOUNTER — PROCEDURE VISIT (OUTPATIENT)
Dept: MATERNAL FETAL MEDICINE | Facility: CLINIC | Age: 29
End: 2024-06-12
Payer: COMMERCIAL

## 2024-06-12 VITALS
SYSTOLIC BLOOD PRESSURE: 104 MMHG | WEIGHT: 215 LBS | BODY MASS INDEX: 34.55 KG/M2 | DIASTOLIC BLOOD PRESSURE: 84 MMHG | HEIGHT: 66 IN | HEART RATE: 91 BPM

## 2024-06-12 DIAGNOSIS — Z86.69 H/O MIGRAINE DURING PREGNANCY: Primary | ICD-10-CM

## 2024-06-12 DIAGNOSIS — Z87.59 H/O MIGRAINE DURING PREGNANCY: Primary | ICD-10-CM

## 2024-06-12 DIAGNOSIS — O99.713 PRURITUS OF PREGNANCY IN THIRD TRIMESTER: ICD-10-CM

## 2024-06-12 DIAGNOSIS — O99.213 OBESITY AFFECTING PREGNANCY IN THIRD TRIMESTER, UNSPECIFIED OBESITY TYPE: ICD-10-CM

## 2024-06-12 DIAGNOSIS — L29.9 PRURITUS OF PREGNANCY IN THIRD TRIMESTER: ICD-10-CM

## 2024-06-12 DIAGNOSIS — O28.3 ABNORMAL PRENATAL ULTRASOUND: ICD-10-CM

## 2024-06-12 DIAGNOSIS — O26.03 EXCESSIVE WEIGHT GAIN IN PREGNANCY IN THIRD TRIMESTER: ICD-10-CM

## 2024-06-12 DIAGNOSIS — O99.343 MENTAL DISORDER AFFECTING PREGNANCY IN THIRD TRIMESTER: ICD-10-CM

## 2024-06-12 DIAGNOSIS — F33.1 MODERATE EPISODE OF RECURRENT MAJOR DEPRESSIVE DISORDER: Chronic | ICD-10-CM

## 2024-06-12 DIAGNOSIS — R00.2 HEART PALPITATIONS: Chronic | ICD-10-CM

## 2024-06-12 DIAGNOSIS — O35.9XX0 SUSPECTED FETAL ABNORMALITY AFFECTING MANAGEMENT OF MOTHER, SINGLE OR UNSPECIFIED FETUS: ICD-10-CM

## 2024-06-12 DIAGNOSIS — O09.90 AT HIGH RISK FOR COMPLICATIONS OF INTRAUTERINE PREGNANCY (IUP): ICD-10-CM

## 2024-06-12 DIAGNOSIS — O21.9 NAUSEA AND VOMITING DURING PREGNANCY: ICD-10-CM

## 2024-06-12 PROCEDURE — 99213 OFFICE O/P EST LOW 20 MIN: CPT | Mod: S$GLB,,, | Performed by: OBSTETRICS & GYNECOLOGY

## 2024-06-12 PROCEDURE — 1160F RVW MEDS BY RX/DR IN RCRD: CPT | Mod: CPTII,S$GLB,, | Performed by: OBSTETRICS & GYNECOLOGY

## 2024-06-12 PROCEDURE — 3074F SYST BP LT 130 MM HG: CPT | Mod: CPTII,S$GLB,, | Performed by: OBSTETRICS & GYNECOLOGY

## 2024-06-12 PROCEDURE — 76816 OB US FOLLOW-UP PER FETUS: CPT | Mod: S$GLB,,, | Performed by: OBSTETRICS & GYNECOLOGY

## 2024-06-12 PROCEDURE — 3079F DIAST BP 80-89 MM HG: CPT | Mod: CPTII,S$GLB,, | Performed by: OBSTETRICS & GYNECOLOGY

## 2024-06-12 PROCEDURE — 3008F BODY MASS INDEX DOCD: CPT | Mod: CPTII,S$GLB,, | Performed by: OBSTETRICS & GYNECOLOGY

## 2024-06-12 PROCEDURE — 1159F MED LIST DOCD IN RCRD: CPT | Mod: CPTII,S$GLB,, | Performed by: OBSTETRICS & GYNECOLOGY

## 2024-06-12 PROCEDURE — 76819 FETAL BIOPHYS PROFIL W/O NST: CPT | Mod: S$GLB,,, | Performed by: OBSTETRICS & GYNECOLOGY

## 2024-06-19 ENCOUNTER — HOSPITAL ENCOUNTER (INPATIENT)
Facility: HOSPITAL | Age: 29
LOS: 2 days | Discharge: HOME | End: 2024-06-21
Attending: OBSTETRICS & GYNECOLOGY | Admitting: STUDENT IN AN ORGANIZED HEALTH CARE EDUCATION/TRAINING PROGRAM
Payer: COMMERCIAL

## 2024-06-19 DIAGNOSIS — Z37.9 NORMAL LABOR: ICD-10-CM

## 2024-06-19 DIAGNOSIS — Z3A.38 PREGNANCY WITH 38 COMPLETED WEEKS GESTATION: Primary | ICD-10-CM

## 2024-06-19 LAB
BASOPHILS # BLD AUTO: 0.05 X10(3)/MCL
BASOPHILS NFR BLD AUTO: 0.4 %
EOSINOPHIL # BLD AUTO: 0.07 X10(3)/MCL (ref 0–0.9)
EOSINOPHIL NFR BLD AUTO: 0.5 %
ERYTHROCYTE [DISTWIDTH] IN BLOOD BY AUTOMATED COUNT: 13.9 % (ref 11.5–17)
GROUP & RH: NORMAL
HCT VFR BLD AUTO: 35.1 % (ref 37–47)
HGB BLD-MCNC: 12.3 G/DL (ref 12–16)
IMM GRANULOCYTES # BLD AUTO: 0.05 X10(3)/MCL (ref 0–0.04)
IMM GRANULOCYTES NFR BLD AUTO: 0.4 %
INDIRECT COOMBS: NORMAL
LYMPHOCYTES # BLD AUTO: 2.15 X10(3)/MCL (ref 0.6–4.6)
LYMPHOCYTES NFR BLD AUTO: 15.9 %
MCH RBC QN AUTO: 28.4 PG (ref 27–31)
MCHC RBC AUTO-ENTMCNC: 35 G/DL (ref 33–36)
MCV RBC AUTO: 81.1 FL (ref 80–94)
MONOCYTES # BLD AUTO: 0.58 X10(3)/MCL (ref 0.1–1.3)
MONOCYTES NFR BLD AUTO: 4.3 %
NEUTROPHILS # BLD AUTO: 10.64 X10(3)/MCL (ref 2.1–9.2)
NEUTROPHILS NFR BLD AUTO: 78.5 %
NRBC BLD AUTO-RTO: 0 %
PLATELET # BLD AUTO: 332 X10(3)/MCL (ref 130–400)
PMV BLD AUTO: 10.4 FL (ref 7.4–10.4)
RBC # BLD AUTO: 4.33 X10(6)/MCL (ref 4.2–5.4)
SPECIMEN OUTDATE: NORMAL
T PALLIDUM AB SER QL: NONREACTIVE
WBC # BLD AUTO: 13.54 X10(3)/MCL (ref 4.5–11.5)

## 2024-06-19 PROCEDURE — 11000001 HC ACUTE MED/SURG PRIVATE ROOM

## 2024-06-19 PROCEDURE — 51702 INSERT TEMP BLADDER CATH: CPT

## 2024-06-19 PROCEDURE — 72100003 HC LABOR CARE, EA. ADDL. 8 HRS

## 2024-06-19 PROCEDURE — 63600175 PHARM REV CODE 636 W HCPCS: Performed by: STUDENT IN AN ORGANIZED HEALTH CARE EDUCATION/TRAINING PROGRAM

## 2024-06-19 PROCEDURE — 25000003 PHARM REV CODE 250: Performed by: OBSTETRICS & GYNECOLOGY

## 2024-06-19 PROCEDURE — 0HQ9XZZ REPAIR PERINEUM SKIN, EXTERNAL APPROACH: ICD-10-PCS | Performed by: OBSTETRICS & GYNECOLOGY

## 2024-06-19 PROCEDURE — 72200006 HC VAGINAL DELIVERY LEVEL III

## 2024-06-19 PROCEDURE — 86850 RBC ANTIBODY SCREEN: CPT | Performed by: OBSTETRICS & GYNECOLOGY

## 2024-06-19 PROCEDURE — 99285 EMERGENCY DEPT VISIT HI MDM: CPT | Mod: 25

## 2024-06-19 PROCEDURE — 85025 COMPLETE CBC W/AUTO DIFF WBC: CPT | Performed by: OBSTETRICS & GYNECOLOGY

## 2024-06-19 PROCEDURE — 25000003 PHARM REV CODE 250

## 2024-06-19 PROCEDURE — 72200004 HC VAGINAL DELIVERY LEVEL I

## 2024-06-19 PROCEDURE — 86780 TREPONEMA PALLIDUM: CPT | Performed by: OBSTETRICS & GYNECOLOGY

## 2024-06-19 PROCEDURE — 63600175 PHARM REV CODE 636 W HCPCS: Performed by: OBSTETRICS & GYNECOLOGY

## 2024-06-19 PROCEDURE — 86900 BLOOD TYPING SEROLOGIC ABO: CPT | Performed by: OBSTETRICS & GYNECOLOGY

## 2024-06-19 PROCEDURE — 72100002 HC LABOR CARE, 1ST 8 HOURS

## 2024-06-19 RX ORDER — OXYTOCIN/RINGER'S LACTATE 30/500 ML
334 PLASTIC BAG, INJECTION (ML) INTRAVENOUS ONCE AS NEEDED
Status: DISCONTINUED | OUTPATIENT
Start: 2024-06-19 | End: 2024-06-21 | Stop reason: HOSPADM

## 2024-06-19 RX ORDER — PRENATAL WITH FERROUS FUM AND FOLIC ACID 3080; 920; 120; 400; 22; 1.84; 3; 20; 10; 1; 12; 200; 27; 25; 2 [IU]/1; [IU]/1; MG/1; [IU]/1; MG/1; MG/1; MG/1; MG/1; MG/1; MG/1; UG/1; MG/1; MG/1; MG/1; MG/1
1 TABLET ORAL DAILY
Status: DISCONTINUED | OUTPATIENT
Start: 2024-06-20 | End: 2024-06-21 | Stop reason: HOSPADM

## 2024-06-19 RX ORDER — SODIUM CHLORIDE 9 MG/ML
INJECTION, SOLUTION INTRAVENOUS
Status: DISCONTINUED | OUTPATIENT
Start: 2024-06-19 | End: 2024-06-19

## 2024-06-19 RX ORDER — OXYTOCIN/RINGER'S LACTATE 30/500 ML
334 PLASTIC BAG, INJECTION (ML) INTRAVENOUS ONCE AS NEEDED
Status: DISCONTINUED | OUTPATIENT
Start: 2024-06-19 | End: 2024-06-19 | Stop reason: SDUPTHER

## 2024-06-19 RX ORDER — NALOXONE HCL 0.4 MG/ML
0.02 VIAL (ML) INJECTION
Status: DISCONTINUED | OUTPATIENT
Start: 2024-06-19 | End: 2024-06-21 | Stop reason: HOSPADM

## 2024-06-19 RX ORDER — MISOPROSTOL 100 UG/1
800 TABLET ORAL ONCE AS NEEDED
Status: DISCONTINUED | OUTPATIENT
Start: 2024-06-19 | End: 2024-06-19 | Stop reason: SDUPTHER

## 2024-06-19 RX ORDER — EPHEDRINE SULFATE 50 MG/ML
INJECTION, SOLUTION INTRAVENOUS
Status: COMPLETED
Start: 2024-06-19 | End: 2024-06-19

## 2024-06-19 RX ORDER — CALCIUM CARBONATE 200(500)MG
500 TABLET,CHEWABLE ORAL 3 TIMES DAILY PRN
Status: DISCONTINUED | OUTPATIENT
Start: 2024-06-19 | End: 2024-06-21 | Stop reason: HOSPADM

## 2024-06-19 RX ORDER — OXYTOCIN/RINGER'S LACTATE 30/500 ML
334 PLASTIC BAG, INJECTION (ML) INTRAVENOUS ONCE
Status: COMPLETED | OUTPATIENT
Start: 2024-06-19 | End: 2024-06-19

## 2024-06-19 RX ORDER — DIPHENHYDRAMINE HYDROCHLORIDE 50 MG/ML
25 INJECTION INTRAMUSCULAR; INTRAVENOUS EVERY 4 HOURS PRN
Status: DISCONTINUED | OUTPATIENT
Start: 2024-06-19 | End: 2024-06-21 | Stop reason: HOSPADM

## 2024-06-19 RX ORDER — ONDANSETRON 4 MG/1
8 TABLET, ORALLY DISINTEGRATING ORAL EVERY 8 HOURS PRN
Status: DISCONTINUED | OUTPATIENT
Start: 2024-06-19 | End: 2024-06-19 | Stop reason: SDUPTHER

## 2024-06-19 RX ORDER — NALOXONE HCL 0.4 MG/ML
0.04 VIAL (ML) INJECTION EVERY 10 MIN PRN
Status: DISCONTINUED | OUTPATIENT
Start: 2024-06-19 | End: 2024-06-21 | Stop reason: HOSPADM

## 2024-06-19 RX ORDER — OXYTOCIN 10 [USP'U]/ML
10 INJECTION, SOLUTION INTRAMUSCULAR; INTRAVENOUS ONCE AS NEEDED
Status: DISCONTINUED | OUTPATIENT
Start: 2024-06-19 | End: 2024-06-19 | Stop reason: SDUPTHER

## 2024-06-19 RX ORDER — CARBOPROST TROMETHAMINE 250 UG/ML
250 INJECTION, SOLUTION INTRAMUSCULAR
Status: DISCONTINUED | OUTPATIENT
Start: 2024-06-19 | End: 2024-06-21 | Stop reason: HOSPADM

## 2024-06-19 RX ORDER — HYDROCORTISONE 25 MG/G
CREAM TOPICAL 3 TIMES DAILY PRN
Status: DISCONTINUED | OUTPATIENT
Start: 2024-06-19 | End: 2024-06-21 | Stop reason: HOSPADM

## 2024-06-19 RX ORDER — ACETAMINOPHEN 325 MG/1
650 TABLET ORAL EVERY 6 HOURS PRN
Status: DISCONTINUED | OUTPATIENT
Start: 2024-06-19 | End: 2024-06-21 | Stop reason: HOSPADM

## 2024-06-19 RX ORDER — SIMETHICONE 80 MG
1 TABLET,CHEWABLE ORAL 4 TIMES DAILY PRN
Status: DISCONTINUED | OUTPATIENT
Start: 2024-06-19 | End: 2024-06-19 | Stop reason: SDUPTHER

## 2024-06-19 RX ORDER — OXYTOCIN/RINGER'S LACTATE 30/500 ML
95 PLASTIC BAG, INJECTION (ML) INTRAVENOUS ONCE
Status: DISCONTINUED | OUTPATIENT
Start: 2024-06-19 | End: 2024-06-21 | Stop reason: HOSPADM

## 2024-06-19 RX ORDER — DIPHENOXYLATE HYDROCHLORIDE AND ATROPINE SULFATE 2.5; .025 MG/1; MG/1
2 TABLET ORAL EVERY 6 HOURS PRN
Status: DISCONTINUED | OUTPATIENT
Start: 2024-06-19 | End: 2024-06-21 | Stop reason: HOSPADM

## 2024-06-19 RX ORDER — MISOPROSTOL 100 UG/1
800 TABLET ORAL ONCE AS NEEDED
Status: DISCONTINUED | OUTPATIENT
Start: 2024-06-19 | End: 2024-06-21 | Stop reason: HOSPADM

## 2024-06-19 RX ORDER — IBUPROFEN 600 MG/1
600 TABLET ORAL EVERY 6 HOURS PRN
Status: DISCONTINUED | OUTPATIENT
Start: 2024-06-19 | End: 2024-06-21 | Stop reason: HOSPADM

## 2024-06-19 RX ORDER — SODIUM CHLORIDE, SODIUM LACTATE, POTASSIUM CHLORIDE, CALCIUM CHLORIDE 600; 310; 30; 20 MG/100ML; MG/100ML; MG/100ML; MG/100ML
INJECTION, SOLUTION INTRAVENOUS CONTINUOUS
Status: DISCONTINUED | OUTPATIENT
Start: 2024-06-19 | End: 2024-06-19

## 2024-06-19 RX ORDER — ONDANSETRON HYDROCHLORIDE 2 MG/ML
4 INJECTION, SOLUTION INTRAVENOUS EVERY 6 HOURS PRN
Status: DISCONTINUED | OUTPATIENT
Start: 2024-06-19 | End: 2024-06-19 | Stop reason: SDUPTHER

## 2024-06-19 RX ORDER — LIDOCAINE HYDROCHLORIDE 10 MG/ML
10 INJECTION INFILTRATION; PERINEURAL ONCE AS NEEDED
Status: DISCONTINUED | OUTPATIENT
Start: 2024-06-19 | End: 2024-06-21 | Stop reason: HOSPADM

## 2024-06-19 RX ORDER — DIPHENHYDRAMINE HCL 25 MG
25 CAPSULE ORAL EVERY 4 HOURS PRN
Status: DISCONTINUED | OUTPATIENT
Start: 2024-06-19 | End: 2024-06-21 | Stop reason: HOSPADM

## 2024-06-19 RX ORDER — OXYTOCIN/RINGER'S LACTATE 30/500 ML
95 PLASTIC BAG, INJECTION (ML) INTRAVENOUS ONCE AS NEEDED
Status: DISCONTINUED | OUTPATIENT
Start: 2024-06-19 | End: 2024-06-21 | Stop reason: HOSPADM

## 2024-06-19 RX ORDER — SIMETHICONE 80 MG
1 TABLET,CHEWABLE ORAL EVERY 6 HOURS PRN
Status: DISCONTINUED | OUTPATIENT
Start: 2024-06-19 | End: 2024-06-21 | Stop reason: HOSPADM

## 2024-06-19 RX ORDER — HYDROCODONE BITARTRATE AND ACETAMINOPHEN 10; 325 MG/1; MG/1
1 TABLET ORAL EVERY 4 HOURS PRN
Status: DISCONTINUED | OUTPATIENT
Start: 2024-06-19 | End: 2024-06-21 | Stop reason: HOSPADM

## 2024-06-19 RX ORDER — ONDANSETRON 4 MG/1
8 TABLET, ORALLY DISINTEGRATING ORAL EVERY 8 HOURS PRN
Status: DISCONTINUED | OUTPATIENT
Start: 2024-06-19 | End: 2024-06-21 | Stop reason: HOSPADM

## 2024-06-19 RX ORDER — METHYLERGONOVINE MALEATE 0.2 MG/ML
200 INJECTION INTRAVENOUS ONCE AS NEEDED
Status: DISCONTINUED | OUTPATIENT
Start: 2024-06-19 | End: 2024-06-21 | Stop reason: HOSPADM

## 2024-06-19 RX ORDER — OXYTOCIN 10 [USP'U]/ML
10 INJECTION, SOLUTION INTRAMUSCULAR; INTRAVENOUS ONCE AS NEEDED
Status: DISCONTINUED | OUTPATIENT
Start: 2024-06-19 | End: 2024-06-21 | Stop reason: HOSPADM

## 2024-06-19 RX ORDER — DIPHENOXYLATE HYDROCHLORIDE AND ATROPINE SULFATE 2.5; .025 MG/1; MG/1
2 TABLET ORAL EVERY 6 HOURS PRN
Status: DISCONTINUED | OUTPATIENT
Start: 2024-06-19 | End: 2024-06-19 | Stop reason: SDUPTHER

## 2024-06-19 RX ORDER — DOCUSATE SODIUM 100 MG/1
200 CAPSULE, LIQUID FILLED ORAL 2 TIMES DAILY PRN
Status: DISCONTINUED | OUTPATIENT
Start: 2024-06-19 | End: 2024-06-21 | Stop reason: HOSPADM

## 2024-06-19 RX ORDER — METHYLERGONOVINE MALEATE 0.2 MG/ML
200 INJECTION INTRAVENOUS ONCE AS NEEDED
Status: DISCONTINUED | OUTPATIENT
Start: 2024-06-19 | End: 2024-06-19 | Stop reason: SDUPTHER

## 2024-06-19 RX ORDER — CARBOPROST TROMETHAMINE 250 UG/ML
250 INJECTION, SOLUTION INTRAMUSCULAR
Status: DISCONTINUED | OUTPATIENT
Start: 2024-06-19 | End: 2024-06-19 | Stop reason: SDUPTHER

## 2024-06-19 RX ORDER — PENICILLIN G 3000000 [IU]/50ML
3 INJECTION, SOLUTION INTRAVENOUS
Status: DISCONTINUED | OUTPATIENT
Start: 2024-06-19 | End: 2024-06-19

## 2024-06-19 RX ORDER — HYDROCODONE BITARTRATE AND ACETAMINOPHEN 5; 325 MG/1; MG/1
1 TABLET ORAL EVERY 4 HOURS PRN
Status: DISCONTINUED | OUTPATIENT
Start: 2024-06-19 | End: 2024-06-21 | Stop reason: HOSPADM

## 2024-06-19 RX ORDER — FENTANYL/BUPIVACAINE/NS/PF 2-1250MCG
PLASTIC BAG, INJECTION (ML) INJECTION CONTINUOUS
Status: DISCONTINUED | OUTPATIENT
Start: 2024-06-19 | End: 2024-06-19

## 2024-06-19 RX ORDER — OXYTOCIN/RINGER'S LACTATE 30/500 ML
95 PLASTIC BAG, INJECTION (ML) INTRAVENOUS ONCE AS NEEDED
Status: DISCONTINUED | OUTPATIENT
Start: 2024-06-19 | End: 2024-06-19 | Stop reason: SDUPTHER

## 2024-06-19 RX ORDER — ONDANSETRON HYDROCHLORIDE 2 MG/ML
4 INJECTION, SOLUTION INTRAVENOUS EVERY 6 HOURS PRN
Status: DISCONTINUED | OUTPATIENT
Start: 2024-06-19 | End: 2024-06-21 | Stop reason: HOSPADM

## 2024-06-19 RX ADMIN — OXYTOCIN 334 MILLI-UNITS/MIN: 10 INJECTION, SOLUTION INTRAMUSCULAR; INTRAVENOUS at 04:06

## 2024-06-19 RX ADMIN — ONDANSETRON 4 MG: 2 INJECTION INTRAMUSCULAR; INTRAVENOUS at 11:06

## 2024-06-19 RX ADMIN — IBUPROFEN 600 MG: 600 TABLET, FILM COATED ORAL at 06:06

## 2024-06-19 RX ADMIN — IBUPROFEN 600 MG: 600 TABLET, FILM COATED ORAL at 11:06

## 2024-06-19 RX ADMIN — EPHEDRINE SULFATE 10 MG: 50 INJECTION INTRAVENOUS at 01:06

## 2024-06-19 NOTE — PLAN OF CARE
Problem:  Fall Injury Risk  Goal: Absence of Fall, Infant Drop and Related Injury  Outcome: Progressing     Problem: Adult Inpatient Plan of Care  Goal: Plan of Care Review  Outcome: Progressing  Goal: Patient-Specific Goal (Individualized)  Outcome: Progressing  Goal: Absence of Hospital-Acquired Illness or Injury  Outcome: Progressing  Goal: Optimal Comfort and Wellbeing  Outcome: Progressing  Goal: Readiness for Transition of Care  Outcome: Progressing     Problem: Infection  Goal: Absence of Infection Signs and Symptoms  Outcome: Progressing

## 2024-06-19 NOTE — ED PROVIDER NOTES
"MICHAEL NOTE     Admit Date: 2024  MICHAEL Physician: Gorge Hoyt  Primary OBGYN: Dr. Hay    Admit Diagnosis/Chief Complaint: Contractions      Chief Complaint   Patient presents with    Contractions     Since 8 am. Denies any bleeding or leaking of fluid. Dark red spotting noted after vaginal exam       Hospital Course:  Salma Montero is a 29 y.o.  at 38w4d presents complaining of contractions      Patient states has been complicated by maternal tachycardia and anxiety followed by Maternal-Fetal Medicine in this pregnancy.       Patient denies leakage of fluid   Fetal Movement: normal.    Temp 97.7 °F (36.5 °C) (Oral)   Ht 5' 6" (1.676 m)   Wt 96.6 kg (213 lb)   LMP 2023 (Exact Date) Comment: POSITIVE HOME PREG.  BMI 34.38 kg/m²   Temp:  [97.7 °F (36.5 °C)] 97.7 °F (36.5 °C)    General: in no apparent distress  Abdominal: soft, nontender, nondistended, no abnormal masses, no epigastric pain FHT present  Back: lumbar tenderness absent   CVA tenderness none  Extremeties no redness or tenderness in the calves or thighs no edema    SSE:   SVE:SVE (PeriWATCH)  Dilation (cm): 3  Effacement (%): 80  Station: -2  Cervical Consistency: Soft  Examined by:: Theresa Pisano  Simplified Finley Score: 6     FHT:  Reactive  TOCO: Contractions every 2-4 min      LABS:   No results found for this or any previous visit (from the past 24 hour(s)).  [unfilled]     Imaging Results    None          ASSESMENT: Salma Montero is a 29 y.o.   at 38w4d rosalina in labor  Admit to Labor and delivery   IV fluids   GBS prophylaxis  Epidural as needed   Admission labs   Discussed with private physician or on-call physician      Discharge Diagnosis/Clinical Impression**: Normal labor   Status:Stable                This note was created with the assistance of [employer] Scaler voice recognition software. There may be transcription errors as a result of using this technology however minimal. Effort has been " made to assure accuracy of transcription but any obvious errors or omissions should be clarified with the author of the document.

## 2024-06-19 NOTE — L&D DELIVERY NOTE
[facility] - Labor and Delivery  OBGYN  Operative Note    SUMMARY     Date of Procedure: 2024    Procedure: Spontaneous Vaginal Delivery    Surgeon: Delroy Alexandra MD    Post-Operative Diagnosis:   1. s/p  38w4d without complications  2. 1st degree perineal laceration and right labial laceration  with repair      Anesthesia: epidural    Procedure in Detail: With good maternal effort a viable liveborn infant was delivered after approximately 30 minutes of pushing. The fetal head delivered. Nuchal cord was not present. Remainder of infant delivered without difficulty. The placenta then delivered spontaneously, and was noted to be intact. The vagina, perineum, and cervix were examined. . After any necessary repairs were performed, all instruments and sponges were removed from the vagina. Sponge, lap, and needle counts were correct after the procedure.    Repair Suture: 3.0 vicryl in standard fashion    Infant: Liveborn male infant 3 vessel umbilical cord.  Apgars    Living status:   Apgar Component Scores:  1 min.:  5 min.:  10 min.:  15 min.:  20 min.:    Skin color:         Heart rate:         Reflex irritability:         Muscle tone:         Respiratory effort:         Total:                  Complications: none    Estimated Blood Loss (EBL): 200 cc           Condition: Mother and baby doing well

## 2024-06-19 NOTE — PROGRESS NOTES
06/19/24 1636   TeleStork Janna Note - Strip   Strip Reviewed by Janna Nurse? Yes   TeleStork Janna Note - Communication   [place] Nurse Communicated with Bedside Nurse Regarding: Fetal Status   TeleStork Janna Note - Notification   Nurse Notified? Yes

## 2024-06-19 NOTE — H&P
HISTORY AND PHYSICAL                                                OBSTETRICS        Chief Complaint:  Labor contractions     Subjective:      Salma Montero is a 29 y.o.  female with IUP at 38w4d gestation who presents to L&D for active labor.    Patient reports regular contraction increasing in frequency and intensity.  She reports normal fetal movement, and denies vaginal bleeding or loss of fluid.  Although she does have h/o PTSD requiring psychiatric medications and has seen MFM (Dr. Winston) for this reason, her pregnancy has been uncomplicated thus far.  Please see antepartum records for more details.  Care this pregnancy has been with Rico Hay MD     PMHx:   Past Medical History:   Diagnosis Date    Dizziness     Endometriosis of uterus     GERD (gastroesophageal reflux disease)     Headache     Obesity     Palpitations     PTSD (post-traumatic stress disorder)     BiPolar disorder   dx.        PSHx:   Past Surgical History:   Procedure Laterality Date    ADENOIDECTOMY      TONSILLECTOMY      TONSILLECTOMY AND ADENOIDECTOMY      UPPER GASTROINTESTINAL ENDOSCOPY         All:   Review of patient's allergies indicates:   Allergen Reactions    Corn meal-luffa cylindrica frt Anaphylaxis, Hives, Itching and Rash    Orange Hives and Itching    Sulfa (sulfonamide antibiotics) Rash, Hives and Itching       Meds:   Medications Prior to Admission   Medication Sig Dispense Refill Last Dose    aspirin (ECOTRIN) 81 MG EC tablet Take 2 tablets (162 mg total) by mouth once daily. Start at 12 weeks gestation.  At 36 weeks gestation, decrease to one 81mg tablet daily. 60 tablet 6 2024    cholecalciferol, vitamin D3, (VITAMIN D3) 50 mcg (2,000 unit) Cap capsule Take 50 mcg by mouth once daily.   2024    folic acid (FOLVITE) 1 MG tablet TAKE 1 TABLET(1 MG) BY MOUTH EVERY DAY 30 tablet 4 2024    lamoTRIgine (LAMICTAL) 150 MG Tab Take 1 tablet (150 mg total) by mouth 2 (two) times daily. 60  tablet 5 6/18/2024    metoprolol succinate (TOPROL-XL) 50 MG 24 hr tablet Take 1 tablet (50 mg total) by mouth every evening. 30 tablet 6 6/18/2024    pantoprazole (PROTONIX) 40 MG tablet Take 1 tablet (40 mg total) by mouth 2 (two) times daily. 60 tablet 2 6/18/2024    scopolamine (TRANSDERM-SCOP) 1.3-1.5 mg (1 mg over 3 days) Place 1 patch onto the skin every 72 hours. 10 patch 3 6/19/2024    traZODone (DESYREL) 100 MG tablet Take 1-2 tablets (100-200 mg total) by mouth nightly as needed for Insomnia. 60 tablet 5 6/18/2024    vortioxetine (TRINTELLIX) 20 mg Tab Take 1 tablet (20 mg total) by mouth Daily. 30 tablet 5 6/18/2024    [DISCONTINUED] famotidine-calcium carbonate-magnesium hydroxide (PEPCID COMPLETE) -165 mg Take 1 tablet by mouth daily as needed.   6/18/2024    [DISCONTINUED] metoclopramide HCl (REGLAN) 10 MG tablet Take 1 tablet (10 mg total) by mouth 3 (three) times daily with meals. 90 tablet 1 Past Week    ondansetron (ZOFRAN-ODT) 4 MG TbDL Take 1 tablet (4 mg total) by mouth every 6 (six) hours as needed (nausea and vomiting). 40 tablet 5     [DISCONTINUED] cetirizine (ALLER-VALE) 10 MG tablet Take 1 tablet by mouth once daily.       [DISCONTINUED] inulin-chromium picolinate (FIBER SELECT GUMMIES) 2-100 gram-mcg Chew Take 2 tablets by mouth once daily.       [DISCONTINUED] promethazine (PHENERGAN) 25 MG suppository Place 1 suppository (25 mg total) rectally every 6 (six) hours as needed for Nausea. 10 suppository 1     [DISCONTINUED] sumatriptan (IMITREX) 100 MG tablet Take 1 tablet (100 mg total) by mouth every 2 (two) hours as needed for Migraine. 10 tablet 4        SH:   Social History     Socioeconomic History    Marital status:    Tobacco Use    Smoking status: Former     Types: Vaping with nicotine    Smokeless tobacco: Never    Tobacco comments:     I just stated smoking a vape in June   Substance and Sexual Activity    Alcohol use: Not Currently     Alcohol/week: 2.0 standard  drinks of alcohol     Types: 2 Shots of liquor per week     Comment: couple times a month    Drug use: Not Currently     Frequency: 21.0 times per week     Types: Marijuana, Nitrous oxide    Sexual activity: Yes     Partners: Male     Birth control/protection: None     Social Determinants of Health     Physical Activity: Inactive (2022)    Exercise Vital Sign     Days of Exercise per Week: 0 days     Minutes of Exercise per Session: 0 min       FH:   Family History   Problem Relation Name Age of Onset    Heart failure Father Monty     Alcohol abuse Father Monty     Drug abuse Father Monty     Early death Father Monty         Heart attack    Bipolar disorder Sister Florecita     Crohn's disease Sister Florecita     Depression Sister Florecita     Mental illness Sister Florecita     Cancer Maternal Grandmother Verlen     Anxiety disorder Maternal Grandmother Verlen     Alzheimer's disease Maternal Grandmother Verlen     Thyroid disease Maternal Grandmother Verlen     Mental illness Maternal Grandmother Verlen     Heart failure Maternal Grandfather Gilberto     Diabetes Maternal Grandfather Gilberto     Skin cancer Maternal Grandfather Gilberto     Cancer Maternal Grandfather Gilberto     Bipolar disorder Cousin      Bipolar disorder Cousin      Mental illness Mother Nona     Heart disease Paternal Grandmother Natasha You     Depression Sister Ashloi     Mental illness Sister Ashloi        OBHx:   OB History    Para Term  AB Living   1 0 0 0 0 0   SAB IAB Ectopic Multiple Live Births   0 0 0 0 0      # Outcome Date GA Lbr Butch/2nd Weight Sex Type Anes PTL Lv   1 Current                Objective:      [unfilled]  [unfilled]  BMI Readings from Last 1 Encounters:   24 34.38 kg/m²         General:   alert and cooperative   HEENT:  normocephalic, atraumatic   Lungs:   Normal work of breathing   Heart:   Normal cap refill   Abdomen:  gravid, non-tender   Extremities non-tender, no edema   Derm: no rashes or  lesions   Psych: appropriate mood and affect   Pelvis:  adequate       Cervix:     Dilation: 5 cm    Effacement: 75%    Station:  -3               Vertex by palpation on exam    Lab Review  Prenatal Labs:  Lab Results   Component Value Date    GROUPTRH O POS 2024    INDCOGEL NEG 2024    HGB 12.3 2024    HCT 35.1 (L) 2024     2024    HEPBSAG Nonreactive 2023    LABURIN No Significant Growth 2023    STREPBCULT Negatve 2024    [id number] Not Detected 2023        Assessment:     29 y.o.  at 38w4d gestation here in active labor.      Plan:     1. Risks, benefits, alternatives and possible complications of delivery, possible , possible blood transfusion, possible operative vaginal delivery have been discussed in detail with the patient. All questions have been answered, and Ms. Montero has voiced understanding and agrees to the treatment plan.  2. Consents signed and in chart  3. Admit to Labor and Delivery unit

## 2024-06-20 PROCEDURE — 11000001 HC ACUTE MED/SURG PRIVATE ROOM

## 2024-06-20 PROCEDURE — 25000003 PHARM REV CODE 250: Performed by: OBSTETRICS & GYNECOLOGY

## 2024-06-20 RX ADMIN — DIPHENHYDRAMINE HYDROCHLORIDE 25 MG: 25 CAPSULE ORAL at 02:06

## 2024-06-20 RX ADMIN — DOCUSATE SODIUM 200 MG: 100 CAPSULE, LIQUID FILLED ORAL at 11:06

## 2024-06-20 RX ADMIN — IBUPROFEN 600 MG: 600 TABLET, FILM COATED ORAL at 11:06

## 2024-06-20 RX ADMIN — IBUPROFEN 600 MG: 600 TABLET, FILM COATED ORAL at 06:06

## 2024-06-20 RX ADMIN — IBUPROFEN 600 MG: 600 TABLET, FILM COATED ORAL at 05:06

## 2024-06-20 RX ADMIN — ONDANSETRON 8 MG: 4 TABLET, ORALLY DISINTEGRATING ORAL at 12:06

## 2024-06-20 NOTE — PLAN OF CARE
"  Problem:  Fall Injury Risk  Goal: Absence of Fall, Infant Drop and Related Injury  Outcome: Progressing     Problem: Adult Inpatient Plan of Care  Goal: Plan of Care Review  Outcome: Progressing  Goal: Patient-Specific Goal (Individualized)  Outcome: Progressing  Flowsheets (Taken 2024)  Individualized Care Needs: "pain mangement"  Goal: Absence of Hospital-Acquired Illness or Injury  Outcome: Progressing  Goal: Optimal Comfort and Wellbeing  Outcome: Progressing  Goal: Readiness for Transition of Care  Outcome: Progressing     Problem: Infection  Goal: Absence of Infection Signs and Symptoms  Outcome: Progressing     "

## 2024-06-20 NOTE — PROGRESS NOTES
Copied from baby's chart:    .. Admit Assessment    Patient Identification  Tevin Montero   :  2024  Admit Date:  2024  Attending Provider:  Rai Barlow MD              Referral:   Pt was admitted to NICU with a diagnosis of <principal problem not specified>, and was admitted this hospital stay due to Respiratory distress [R06.03].   is involved and patient was referred to the Social Work Department via Routine NICU consult.        Verified her face sheet information:      Living Situation:      Resides at [address] [facility] [facility] [address] [place] [id number], phone: [phone] (home).         Met with mother and FOB in postpartum room to complete new NICU admit assessment. Mother and FOB were interactive in the assessment and appropriate. Baby's Name: Ricardo Vigil. FOB: Fer Vigil (involved). Mother reports to living at above confirmed address with FOB, this is their first child. Provided mother with LCSW contact info along with parent resource packet.       OB: Dr Bharti Olivas: undecided at this time    Confirmed Supplies: parents report to having all necessary supplies for baby including a carseat and safe sleep     History/Current Symptoms of Anxiety/Depression: mother reports a hx of anxiety, depression and Bipolar II Disorder. She reports that she is prescribed Trintellix, Lamictal and Trazadone. She sees Dr. Damian Ward (psychiatry). She reports that she does not see a counselor at current but that her and Dr. Ward have been closely communicating regarding her follow up plans postpartum  Discussed PPD and identifying symptoms and provided mom with PPD counseling resources and symptom brochure.       Identified Support:  FOB, mother's mother      History/Current Substance Use:  mother denied, per chart mother with hx of THC use     Indications of Abuse/Neglect:   no indications         Emotional/Behavioral/Cognitive Issues: none present at time of assessment       Current RX Prescriptions: Lamictal, Trentellix Trazodone      Adequate Discharge/Visiting Transportation: yes, confirmed     MATHEW Signed/Filed: n/a     Qualifies:   Early steps: no  SSI: no     NICU Assessment completed in Flowsheets: yes    Mother's UDS: none collected on admit  Baby's UDS: Neg, not first void  Baby's MDS pending            Plan: will follow family throughout baby's stay in NICU for any needs, will follow MDS and make DCFS report as necessary       24 1121   NICU Assessment   Assessment Type Discharge Planning Assessment   Source of Information family   Verified Demographic and Insurance Information Yes   Insurance Commercial   Commercial   ([employer])   Lives With mother;father   Name(s) of People in Home Fer Pope   Number people in home 3 including baby   Relationship Status of Parents In relationship   Primary Source of Support/Comfort parent   Other children (include names and ages) n/a   Father's Involvement Fully Involved   Is Father signing the birth certificate Yes   Father Name and  Fer Vigil   Family Involvement Moderate   Primary Contact Name and Number Salma Montero [phone]   Other Contacts Names and Numbers Fer Vigil [phone]   Infant Feeding Plan formula feeding   Does baby have crib or safe sleep space? Yes   Do you have a car seat? Yes   Resource/Environmental Concerns none   Environment Concerns none   Resources/Education Provided Preparing for Your Baby's Discharge Home;Support Resources for NICU Families;Post Partum Depression   DME Needed Upon Discharge  none   DCFS   (MDS pending)   Discharge Plan A Home with family

## 2024-06-20 NOTE — PROGRESS NOTES
Pt assisted up to bathroom after delivery. Pericare performed. Void x1. Pads applied. Steady gait noted.

## 2024-06-20 NOTE — PROGRESS NOTES
PostPartum Progress Note        Subjective:      Postpartum Day #1 after uncomplicated vaginal delivery  .  Patient is without complaints. Lochia decreasing, less than menses.  Pain is well controlled. Patient is ambulating. Tolerating regular diet.Overall mother and baby are doing well.     Objective:      Temp:  [97.9 °F (36.6 °C)-98.7 °F (37.1 °C)] 97.9 °F (36.6 °C)  Pulse:  [0-154] 74  Resp:  [14] 14  SpO2:  [92 %-100 %] 96 %  BP: ()/(45-93) 149/93    Intake/Output Summary (Last 24 hours) at 2024 1133  Last data filed at 2024 0020  Gross per 24 hour   Intake 418.82 ml   Output 800 ml   Net -381.18 ml     Body mass index is 34.38 kg/m².    General: no acute distress  Abdomen: soft, non-tender, non-distended; Fundus firm and below the umbilicus  Extremities: non-tender, symmetric, mild edema    Group & Rh   Date Value Ref Range Status   2024 O POS  Final     Recent Results (from the past 336 hour(s))   CBC with Differential    Collection Time: 24 11:34 AM   Result Value Ref Range    WBC 13.54 (H) 4.50 - 11.50 x10(3)/mcL    Hgb 12.3 12.0 - 16.0 g/dL    Hct 35.1 (L) 37.0 - 47.0 %    Platelet 332 130 - 400 x10(3)/mcL          Assessment:     29 y.o.  S/P , Post-Partum Day # 1 - Doing Well      Plan:     1. Continue routine postpartum care  2. Plan for D/C tomorrow.

## 2024-06-20 NOTE — LACTATION NOTE
This note was copied from a baby's chart.  Medications mom currently taking  Lamictal 150mg BID (L2)  Trazodone 100mg HS (L2)  Trintelix 20mg daily (L3)  Metoprolol 50mg HS (L2)  Scopolamine patch (L3)    Notified MD regarding side effects advised not to use EBM. Parents notified discussed how to effectively decrease supply; verbalized understanding.

## 2024-06-21 VITALS
SYSTOLIC BLOOD PRESSURE: 130 MMHG | WEIGHT: 213 LBS | TEMPERATURE: 98 F | HEART RATE: 88 BPM | RESPIRATION RATE: 14 BRPM | DIASTOLIC BLOOD PRESSURE: 86 MMHG | OXYGEN SATURATION: 96 % | BODY MASS INDEX: 34.23 KG/M2 | HEIGHT: 66 IN

## 2024-06-21 PROBLEM — Z3A.38 PREGNANCY WITH 38 COMPLETED WEEKS GESTATION: Status: ACTIVE | Noted: 2024-06-21

## 2024-06-21 PROCEDURE — 25000003 PHARM REV CODE 250: Performed by: OBSTETRICS & GYNECOLOGY

## 2024-06-21 RX ADMIN — IBUPROFEN 600 MG: 600 TABLET, FILM COATED ORAL at 06:06

## 2024-06-21 RX ADMIN — IBUPROFEN 600 MG: 600 TABLET, FILM COATED ORAL at 12:06

## 2024-06-21 NOTE — PLAN OF CARE
Problem:  Fall Injury Risk  Goal: Absence of Fall, Infant Drop and Related Injury  Outcome: Progressing     Problem: Adult Inpatient Plan of Care  Goal: Plan of Care Review  Outcome: Progressing  Goal: Patient-Specific Goal (Individualized)  Outcome: Progressing  Goal: Absence of Hospital-Acquired Illness or Injury  Outcome: Progressing  Goal: Optimal Comfort and Wellbeing  Outcome: Progressing  Goal: Readiness for Transition of Care  Outcome: Progressing     Problem: Infection  Goal: Absence of Infection Signs and Symptoms  Outcome: Progressing     Problem: Postpartum (Vaginal Delivery)  Goal: Successful Parent Role Transition  Outcome: Progressing  Goal: Hemostasis  Outcome: Progressing  Goal: Absence of Infection Signs and Symptoms  Outcome: Progressing  Goal: Anesthesia/Sedation Recovery  Outcome: Progressing  Goal: Optimal Pain Control and Function  Outcome: Progressing  Goal: Effective Urinary Elimination  Outcome: Progressing

## 2024-06-21 NOTE — DISCHARGE SUMMARY
[facility]                       [address] [id number]    PATIENT NAME:       NIRU RIOJAS  YOB: 1995  CSN:                [id number]   MRN:                [id number]  ADMIT DATE:         06/19/2024 10:56:00  PHYSICIAN:          Crow Clay Jr, MD                          DISCHARGE SUMMARY    DATE OF DISCHARGE:  06/21/2024 00:00:00    DIAGNOSIS:  Status post vaginal delivery.    HOSPITAL COURSE:  The patient underwent vaginal delivery without incident. She   was admitted to the postpartal GYN service where she had an uneventful and   speedy recovery. She was ambulating and tolerating a regular diet.  Her vitals   were stable.  She was afebrile.  She had normal bowel and bladder function.  She   was discharged home on postpartum day 2.    CONDITION ON DISCHARGE:  Stable.    DIET:  Regular.    ACTIVITY:  Pelvic rest.    MEDICATIONS:  Percocet p.r.n., Motrin p.r.n.    FOLLOWUP:  With Dr. Hay as scheduled.        ______________________________  Crow Clay Jr, MD    DJE/AQS  DD:  06/21/2024  Time:  11:34AM  DT:  06/21/2024  Time:  12:19PM  Job #:  [id number]      DISCHARGE SUMMARY

## 2024-06-23 ENCOUNTER — PATIENT MESSAGE (OUTPATIENT)
Dept: ADMINISTRATIVE | Facility: OTHER | Age: 29
End: 2024-06-23
Payer: COMMERCIAL

## 2024-06-25 ENCOUNTER — APPOINTMENT (OUTPATIENT)
Dept: LAB | Facility: HOSPITAL | Age: 29
End: 2024-06-25
Attending: STUDENT IN AN ORGANIZED HEALTH CARE EDUCATION/TRAINING PROGRAM
Payer: COMMERCIAL

## 2024-06-25 DIAGNOSIS — O13.3 GESTATIONAL HYPERTENSION WITHOUT SIGNIFICANT PROTEINURIA IN THIRD TRIMESTER: Primary | ICD-10-CM

## 2024-06-25 LAB
ALBUMIN SERPL-MCNC: 3.2 G/DL (ref 3.5–5)
ALBUMIN/GLOB SERPL: 0.9 RATIO (ref 1.1–2)
ALP SERPL-CCNC: 108 UNIT/L (ref 40–150)
ALT SERPL-CCNC: 31 UNIT/L (ref 0–55)
ANION GAP SERPL CALC-SCNC: 12 MEQ/L
AST SERPL-CCNC: 39 UNIT/L (ref 5–34)
BILIRUB SERPL-MCNC: 0.4 MG/DL
BUN SERPL-MCNC: 9.4 MG/DL (ref 7–18.7)
CALCIUM SERPL-MCNC: 8.9 MG/DL (ref 8.4–10.2)
CHLORIDE SERPL-SCNC: 109 MMOL/L (ref 98–107)
CO2 SERPL-SCNC: 22 MMOL/L (ref 22–29)
CREAT SERPL-MCNC: 0.82 MG/DL (ref 0.55–1.02)
CREAT/UREA NIT SERPL: 11
ERYTHROCYTE [DISTWIDTH] IN BLOOD BY AUTOMATED COUNT: 14.2 % (ref 11.5–17)
GFR SERPLBLD CREATININE-BSD FMLA CKD-EPI: >60 ML/MIN/1.73/M2
GLOBULIN SER-MCNC: 3.7 GM/DL (ref 2.4–3.5)
GLUCOSE SERPL-MCNC: 99 MG/DL (ref 74–100)
HCT VFR BLD AUTO: 33.6 % (ref 37–47)
HGB BLD-MCNC: 11.2 G/DL (ref 12–16)
MCH RBC QN AUTO: 28 PG (ref 27–31)
MCHC RBC AUTO-ENTMCNC: 33.3 G/DL (ref 33–36)
MCV RBC AUTO: 84 FL (ref 80–94)
NRBC BLD AUTO-RTO: 0 %
PLATELET # BLD AUTO: 342 X10(3)/MCL (ref 130–400)
PMV BLD AUTO: 9 FL (ref 7.4–10.4)
POTASSIUM SERPL-SCNC: 3.7 MMOL/L (ref 3.5–5.1)
PROT SERPL-MCNC: 6.9 GM/DL (ref 6.4–8.3)
RBC # BLD AUTO: 4 X10(6)/MCL (ref 4.2–5.4)
SODIUM SERPL-SCNC: 143 MMOL/L (ref 136–145)
WBC # BLD AUTO: 11.63 X10(3)/MCL (ref 4.5–11.5)

## 2024-06-25 PROCEDURE — 85027 COMPLETE CBC AUTOMATED: CPT

## 2024-06-25 PROCEDURE — 36415 COLL VENOUS BLD VENIPUNCTURE: CPT

## 2024-06-25 PROCEDURE — 80053 COMPREHEN METABOLIC PANEL: CPT

## 2024-06-27 ENCOUNTER — OFFICE VISIT (OUTPATIENT)
Dept: BEHAVIORAL HEALTH | Facility: CLINIC | Age: 29
End: 2024-06-27
Payer: MEDICAID

## 2024-06-27 ENCOUNTER — PATIENT MESSAGE (OUTPATIENT)
Dept: BEHAVIORAL HEALTH | Facility: CLINIC | Age: 29
End: 2024-06-27
Payer: COMMERCIAL

## 2024-06-27 DIAGNOSIS — F33.1 MODERATE EPISODE OF RECURRENT MAJOR DEPRESSIVE DISORDER: Primary | Chronic | ICD-10-CM

## 2024-06-27 DIAGNOSIS — F41.1 GAD (GENERALIZED ANXIETY DISORDER): Chronic | ICD-10-CM

## 2024-06-27 DIAGNOSIS — F43.10 PTSD (POST-TRAUMATIC STRESS DISORDER): Chronic | ICD-10-CM

## 2024-06-27 RX ORDER — BUSPIRONE HYDROCHLORIDE 5 MG/1
TABLET ORAL
Qty: 30 TABLET | Refills: 3 | Status: SHIPPED | OUTPATIENT
Start: 2024-06-27

## 2024-06-27 NOTE — TELEPHONE ENCOUNTER
Patient sent a message through [employer] stating that she had a baby last Wednesday and the baby was in the NICU for 6 days. She states that she was doing well on Tueday morning. But, starting Tuesday afternooon, she began to feel depressed, anxious, dread, and panic and that these symptoms are getting worse.   This provider - in Dr. Matson absence - phoned patient

## 2024-06-27 NOTE — PROGRESS NOTES
This is a virtual visit note. I have reviewed the patient's name verbally reviewed the past medical history, active medication list, and allergy list with the patient and verified with a picture ID if applicable. I have verified that this patient is in the state of [place]. The patient has consented to be treated remotely by telemedicine appointment via [facility] approved interactive audio format. The patient does have access to a working audiovisualformat. The patient stated that they understood and accepted the privacy and security risks to their information at their location.  Originating site (where the patient is located): Patient Home   Distant site (where the provider is located): [facility]    Time spent with the patient was     minutes, over 50% of which was used for education and counseling regarding mental health conditions, current medications including risk/benefit and side effects/adverse events, OTC uses/doses, home self care, and indications for immediate medical attention. The patient is receptive, expresses understanding, and is agreeable to plan. All questions answered.    06/27/2024  Patient sent a message through [facility] [employer] [facility] stating that she had a baby last Wednesday and the baby was in the NICU for 6 days. She states that she was doing well on Tueday morning. But, starting Tuesday afternooon, she began to feel depressed, anxious, dread, and panic and that these symptoms are getting worse.   This provider - in Dr. Matson absence - had a virtual visit with patient.     She states that her son is now a week old.   Patient is tearful and she appears very tired.  She states that she has a lot of support: her , her mother, and her sister.  She states that her baby tested + for MJ on Monday and DCSF is involved. She states that this what has triggered her depression.   She states that she was fine on Tuesday morning but on Tuesday afternoon, something inside of her broke  and she began to worry about all of the things that could go wrong.     She states that she has had a lot of nausea and vomiting through out her pregnancy. She had been prescribed several medications that were not helping.   She states that back in March, she took one hit of a Marijuana joint.   She states that everyone has been very understanding.   He had to stay in the NICU for 6 days after aspirating some fluid.     Her  has to go back to work next week and she is frightened to be left alone with the baby.   Her mother works   Her sister lives in [place] and has two kids and her  works off [employer].   She states that her mother would be able to take extra time if needed.  She states that the day time is OK but night time is worse. She becomes very anxious and dreadful. She feels that she cannot take care of the baby by herself. She is scared to admit it. She is scared that if she admits this to professionals, her baby will be taken away.     She does not know how to shut her thoughts off and she does not know how long it will take to take care of her baby by herself.     Spoke with Dr. Ward regarding medications. Will start Buspar 5 to 10mg two to three times a day as needed.   Will also refer to 1:1 therapy for post partum depression.   Patient agreeable to Buspar and therapy.

## 2024-07-17 ENCOUNTER — OFFICE VISIT (OUTPATIENT)
Dept: BEHAVIORAL HEALTH | Facility: CLINIC | Age: 29
End: 2024-07-17
Payer: MEDICAID

## 2024-07-17 DIAGNOSIS — F41.1 GAD (GENERALIZED ANXIETY DISORDER): Chronic | ICD-10-CM

## 2024-07-17 DIAGNOSIS — G44.229 CHRONIC TENSION-TYPE HEADACHE, NOT INTRACTABLE: ICD-10-CM

## 2024-07-17 DIAGNOSIS — G43.009 MIGRAINE WITHOUT AURA AND WITHOUT STATUS MIGRAINOSUS, NOT INTRACTABLE: ICD-10-CM

## 2024-07-17 DIAGNOSIS — G47.00 INSOMNIA, UNSPECIFIED TYPE: ICD-10-CM

## 2024-07-17 DIAGNOSIS — F33.1 MODERATE EPISODE OF RECURRENT MAJOR DEPRESSIVE DISORDER: Chronic | ICD-10-CM

## 2024-07-17 DIAGNOSIS — F43.10 PTSD (POST-TRAUMATIC STRESS DISORDER): Chronic | ICD-10-CM

## 2024-07-17 PROCEDURE — 99214 OFFICE O/P EST MOD 30 MIN: CPT | Mod: AF,HB,95, | Performed by: STUDENT IN AN ORGANIZED HEALTH CARE EDUCATION/TRAINING PROGRAM

## 2024-07-17 PROCEDURE — 1111F DSCHRG MED/CURRENT MED MERGE: CPT | Mod: CPTII,95,, | Performed by: STUDENT IN AN ORGANIZED HEALTH CARE EDUCATION/TRAINING PROGRAM

## 2024-07-17 PROCEDURE — 1159F MED LIST DOCD IN RCRD: CPT | Mod: CPTII,95,, | Performed by: STUDENT IN AN ORGANIZED HEALTH CARE EDUCATION/TRAINING PROGRAM

## 2024-07-17 PROCEDURE — 1160F RVW MEDS BY RX/DR IN RCRD: CPT | Mod: CPTII,95,, | Performed by: STUDENT IN AN ORGANIZED HEALTH CARE EDUCATION/TRAINING PROGRAM

## 2024-07-17 RX ORDER — BUSPIRONE HYDROCHLORIDE 5 MG/1
TABLET ORAL
Qty: 90 TABLET | Refills: 3 | Status: SHIPPED | OUTPATIENT
Start: 2024-07-17

## 2024-07-17 RX ORDER — LAMOTRIGINE 150 MG/1
150 TABLET ORAL 2 TIMES DAILY
Qty: 60 TABLET | Refills: 5 | Status: SHIPPED | OUTPATIENT
Start: 2024-07-17 | End: 2024-11-14

## 2024-07-17 RX ORDER — TRAZODONE HYDROCHLORIDE 50 MG/1
TABLET ORAL
Qty: 60 TABLET | Refills: 5 | Status: SHIPPED | OUTPATIENT
Start: 2024-07-17

## 2024-07-17 NOTE — PROGRESS NOTES
"Outpatient Psychiatry Follow-Up Visit    7/17/2024    Clinical Status of Patient:  Outpatient (Ambulatory)    Chief Complaint:  Salma Montero is a 29 y.o. female who presents today for follow-up of MDD, FLORENCE, PTSD, insomnia. Patient last seen for follow-up on 5/20/2024.  Met with patient.      TELE PSYCHIATRY Disclaimer   *The patient was informed despite using HIPPA compliant technology there may be risks including security breach, technological failure, inability to perform a comprehensive physical exam which could delay or prevent an accurate diagnosis, and potential complications from treatment decisions rendered over a telemedical platform.   The patient was also informed of the relationship between the physician and patient and the respective role of any other health care provider with respect to management of the patient; and notified that the pt may decline to receive medical services by telemedicine and may withdraw from such care at any time.     Patient's Current location: [address] [place] [address]     In Case of Emergency pts next of kin  Name:Fer Vigil  Phone number:  [phone]   Visit type: Virtual visit with synchronous audio and video  Total time spent with patient: 20 minutes    Interval History and Content of Current Session:  Interim Events/Subjective Report/Content of Current Session:   Pt reports doing "much better"  overall.  Notes that she's been gaining more confidence as a parent.  Anxiety improving with parental experience and improved with buspirone.  Mood improving.  Sleep fair, taking turns caring for her child with her partner at nighttime.  Appetite and weight stable.  Energy "ok."  Motivation "good."  Denies SI/HI/AVH/paranoia, denies plan or desire for self harm or harm to others.  Denies SE from current regimen.  No somatic complaints reported.  Pt happy with current regimen and wants to continue.     Review of Systems   PSYCHIATRIC: Pertinant items are noted " "in the narrative.  CONSTITUTIONAL: No weight gain or loss.   MUSCULOSKELETAL: No pain or stiffness of the joints.  NEUROLOGIC: No weakness, sensory changes, seizures, confusion, memory loss, tremor or other abnormal movements.    RESPIRATORY: No shortness of breath.  CARDIOVASCULAR: No tachycardia or chest pain.    GASTROINTESTINAL: denies nausea/vomiting    Past Medical, Family and Social History: The patient's past medical, family and social history have been reviewed and updated as appropriate within the electronic medical record - see encounter notes.    Compliance: good    Side effects: denies    Risk Parameters:  Patient reports no suicidal ideation  Patient reports no homicidal ideation  Patient reports no self-injurious behavior  Patient reports no violent behavior    Exam (detailed: at least 9 elements; comprehensive: all 15 elements)   Constitutional  Vitals:  Most recent vital signs, dated less than 90 days prior to this appointment, were reviewed.     There were no vitals filed for this visit.       General:   Constitutional: No acute distress, appears stated age, casually dressed    Neurologic:   Motor: moves all extremities spontaneously and without difficulty, no abnormal involuntary movements observed  Gait: [facility] 2/2 virtual visit    Mental status examination:   Appearance: appears stated age, casually dressed, no acute distress  Behavior: calm and cooperative,   Mood: "ok"  Affect: mood congruent, anxious appearing  Thought process: linear and goal directed  Associations: appropriate for conversation  Thought content: no plan or desire for self harm or harm to others, denies paranoia, no delusional ideation volunteered  Perceptions: denies hallucinations or other altered perceptions  Orientation: oriented to day of week, month, year, location and situation  Language: English, fluid  Attention: able to attend to interview  Insight: good  Judgement: good    PHQ9:  Over the last two weeks how often have " you been bothered by little interest or pleasure in doing things: 0  Over the last two weeks how often have you been bothered by feeling down, depressed or hopeless: 0  PHQ-2 Total Score: 0  PHQ-9 Score: 4  PHQ-9 Interpretation: Minimal or None        2/26/2024     8:28 AM 11/3/2023     8:42 AM 9/6/2023    11:09 AM   GAD7   1. Feeling nervous, anxious, or on edge? 1 3 3   2. Not being able to stop or control worrying? 1 3 3   3. Worrying too much about different things? 1 3 3   4. Trouble relaxing? 0 1 3   5. Being so restless that it is hard to sit still? 1 1 3   6. Becoming easily annoyed or irritable? 1 3 3   7. Feeling afraid as if something awful might happen? 1 1 3   8. If you checked off any problems, how difficult have these problems made it for you to do your work, take care of things at home, or get along with other people? 1 2 3   FLORENCE-7 Score 6 15 21     Assessment and Diagnosis   Status/Progress: Based on the examination today, the patient's problem(s) is/are resolving.  New problems have not been presented today.   Co-morbidities and Lack of compliance are not complicating management of the primary condition.  Number of separate conditions addressed during today's visit: 3 (mood stable, anxiety improving, sleep fair0.  Are medication adjustments being made today: No.  Are referral(s) being ordered today: No.  Complexity (level) of medical decision making employed in the encounter: MODERATE.    General Impression:    ICD-10-CM ICD-9-CM   1. Moderate episode of recurrent major depressive disorder  F33.1 296.32   2. Insomnia, unspecified type  G47.00 780.52   3. Chronic tension-type headache, not intractable  G44.229 339.12   4. Migraine without aura and without status migrainosus, not intractable  G43.009 346.10   5. FLORENCE (generalized anxiety disorder)  F41.1 300.02   6. PTSD (post-traumatic stress disorder)  F43.10 309.81     Intervention/Counseling/Treatment Plan   Continue trazodone 100-200mg nightly  prn insomnia  Continue lamotrigine 150mg bid  Continue trintellix 20mg daily  Continue buspirone 5mg bid with option for extra PRN dose  No need for PEC as pt is not an imminent danger to self or others or gravely disabled due to acute psychiatric illness  Discussed that pt should either call clinic for psychiatric crisis symptoms or present to nearest emergency room    Discussed with patient informed consent including diagnosis, risks and benefits of proposed treatment above vs. alternative treatments vs. no treatment, as well as serious and common side effects of these treatments, and the inherent unpredictability of individual responses to these treatments. The patient expresses understanding of the above and displays the capacity to agree with this current plan. Patient also agrees that, currently, the benefits outweigh the risks and would like to pursue treatment at this time, and had no other questions.    Instructions:  Take all medications as prescribed.    Abstain from recreational drugs and alcohol.  Present to ED or call 911 for SI/HI plan or intent, psychosis, or medical emergency.    Return to Clinic: Follow up in about 6 weeks (around 8/28/2024).    Total time: Total time spent with patient 20 minutes.     Damian Ward MD  [facility]

## 2024-07-29 PROCEDURE — 87624 HPV HI-RISK TYP POOLED RSLT: CPT | Performed by: STUDENT IN AN ORGANIZED HEALTH CARE EDUCATION/TRAINING PROGRAM

## 2024-09-26 ENCOUNTER — OFFICE VISIT (OUTPATIENT)
Dept: BEHAVIORAL HEALTH | Facility: CLINIC | Age: 29
End: 2024-09-26
Payer: MEDICAID

## 2024-09-26 VITALS
HEART RATE: 87 BPM | TEMPERATURE: 99 F | SYSTOLIC BLOOD PRESSURE: 125 MMHG | DIASTOLIC BLOOD PRESSURE: 84 MMHG | OXYGEN SATURATION: 100 % | BODY MASS INDEX: 32.62 KG/M2 | WEIGHT: 202 LBS

## 2024-09-26 DIAGNOSIS — G44.229 CHRONIC TENSION-TYPE HEADACHE, NOT INTRACTABLE: ICD-10-CM

## 2024-09-26 DIAGNOSIS — F33.1 MODERATE EPISODE OF RECURRENT MAJOR DEPRESSIVE DISORDER: Primary | Chronic | ICD-10-CM

## 2024-09-26 DIAGNOSIS — G43.009 MIGRAINE WITHOUT AURA AND WITHOUT STATUS MIGRAINOSUS, NOT INTRACTABLE: ICD-10-CM

## 2024-09-26 DIAGNOSIS — F43.10 PTSD (POST-TRAUMATIC STRESS DISORDER): Chronic | ICD-10-CM

## 2024-09-26 DIAGNOSIS — F41.1 GAD (GENERALIZED ANXIETY DISORDER): Chronic | ICD-10-CM

## 2024-09-26 PROCEDURE — 99213 OFFICE O/P EST LOW 20 MIN: CPT | Mod: PBBFAC,PN | Performed by: STUDENT IN AN ORGANIZED HEALTH CARE EDUCATION/TRAINING PROGRAM

## 2024-09-26 RX ORDER — BUSPIRONE HYDROCHLORIDE 15 MG/1
15 TABLET ORAL 3 TIMES DAILY
Qty: 90 TABLET | Refills: 5 | Status: SHIPPED | OUTPATIENT
Start: 2024-09-26

## 2024-09-26 RX ORDER — LAMOTRIGINE 150 MG/1
150 TABLET ORAL 2 TIMES DAILY
Qty: 60 TABLET | Refills: 5 | Status: SHIPPED | OUTPATIENT
Start: 2024-09-26 | End: 2025-01-24

## 2024-09-26 NOTE — PROGRESS NOTES
"Outpatient Psychiatry Follow-Up Visit    9/26/2024    Clinical Status of Patient:  Outpatient (Ambulatory)    Chief Complaint:  Salma Montero is a 29 y.o. female who presents today for follow-up of MDD, FLORENCE, PTSD, insomnia. Patient last seen for follow-up on 7/17/2024.  Met with patient.      Interval History and Content of Current Session:  Interim Events/Subjective Report/Content of Current Session:   Pt reports doing "ok"  overall.  Reports "depressed" mood, elevated anxiety (worse than prior).  Notes that childcare responsibilities fall primarily on pt (and less on her partner), pt finds this frustrating.  Will be establishing with a psychotherapist and excited to engage in this work.  Sleep 5 hrs nightly, occasional nighttime awakenings. Appetite inconsistent, weight stable.  Energy low, motivation poor, concentration poor.  +Anhedonia.  Improved irritability, endorses hopelessness.  Denies SI/HI/AVH/paranoia, denies plan or desire for self harm or harm to others.  Denies SE from current regimen. Reports somatic complaints of migraines (increased). Pt voices desire to adjust regimen to address ongoing symptoms.      Review of Systems   PSYCHIATRIC: Pertinant items are noted in the narrative.  CONSTITUTIONAL: No weight gain or loss.   MUSCULOSKELETAL: No pain or stiffness of the joints.  NEUROLOGIC: No weakness, sensory changes, seizures, confusion, memory loss, tremor or other abnormal movements.    RESPIRATORY: No shortness of breath.  CARDIOVASCULAR: No tachycardia or chest pain.    GASTROINTESTINAL: denies nausea/vomiting    Past Medical, Family and Social History: The patient's past medical, family and social history have been reviewed and updated as appropriate within the electronic medical record - see encounter notes.    Compliance: good    Side effects: denies    Risk Parameters:  Patient reports no suicidal ideation  Patient reports no homicidal ideation  Patient reports no self-injurious " "behavior  Patient reports no violent behavior    Exam (detailed: at least 9 elements; comprehensive: all 15 elements)   Constitutional  Vitals:  Most recent vital signs, dated less than 90 days prior to this appointment, were reviewed.     Vitals:    09/26/24 0804   BP: 125/84   Pulse: 87   Temp: 98.7 °F (37.1 °C)   SpO2: 100%   Weight: 91.6 kg (202 lb)        General:   Constitutional: No acute distress, appears stated age, casually dressed    Neurologic:   Motor: moves all extremities spontaneously and without difficulty, no abnormal involuntary movements observed  Gait: Normal gait and station    Mental status examination:   Appearance: appears stated age, casually dressed, no acute distress  Behavior: calm and cooperative  Mood: "ok"  Affect: mood congruent, anxious appearing  Thought process: linear and goal directed  Associations: appropriate for conversation  Thought content: no plan or desire for self harm or harm to others, denies paranoia, no delusional ideation volunteered  Perceptions: denies hallucinations or other altered perceptions  Orientation: oriented to day of week, month, year, location and situation  Language: English, fluid  Attention: able to attend to interview  Insight: good  Judgement: good    PHQ9:  Over the last two weeks how often have you been bothered by little interest or pleasure in doing things: 1  Over the last two weeks how often have you been bothered by feeling down, depressed or hopeless: 3  PHQ-2 Total Score: 4  PHQ-9 Score: 19  PHQ-9 Interpretation: Moderately Severe        9/26/2024     8:03 AM 2/26/2024     8:28 AM 11/3/2023     8:42 AM   GAD7   1. Feeling nervous, anxious, or on edge? 3 1 3   2. Not being able to stop or control worrying? 3 1 3   3. Worrying too much about different things? 3 1 3   4. Trouble relaxing? 3 0 1   5. Being so restless that it is hard to sit still? 3 1 1   6. Becoming easily annoyed or irritable? 3 1 3   7. Feeling afraid as if something awful " might happen? 3 1 1   8. If you checked off any problems, how difficult have these problems made it for you to do your work, take care of things at home, or get along with other people? 3 1 2   FLORENCE-7 Score 21 6 15     Assessment and Diagnosis   Status/Progress: Based on the examination today, the patient's problem(s) is/are adequately but not ideally controlled.  New problems have not been presented today.   Co-morbidities and Lack of compliance are not complicating management of the primary condition.  Number of separate conditions addressed during today's visit: 3 (mood fair control, anxiety inadequately controlled, insomnia fair control).  Medication management: yes: Modifying an existing prescription and Deciding to continue a pre-existing prescription.  Are referral(s) being ordered today: No.  Complexity (level) of medical decision making employed in the encounter: HIGH.    General Impression:    ICD-10-CM ICD-9-CM   1. Moderate episode of recurrent major depressive disorder  F33.1 296.32   2. FLORENCE (generalized anxiety disorder)  F41.1 300.02   3. PTSD (post-traumatic stress disorder)  F43.10 309.81   4. Chronic tension-type headache, not intractable  G44.229 339.12   5. Migraine without aura and without status migrainosus, not intractable  G43.009 346.10     Intervention/Counseling/Treatment Plan   Continue trazodone 100-200mg nightly prn insomnia  Continue lamotrigine 150mg bid  Continue trintellix 20mg daily, consider increasing past FDA max if additional depression symptom control is needed (prior to making more significant changes to antidepressant regimen)  Increase buspirone to 15mg tid prn  No need for PEC as pt is not an imminent danger to self or others or gravely disabled due to acute psychiatric illness  Discussed that pt should either call clinic for psychiatric crisis symptoms or present to nearest emergency room    Discussed with patient informed consent including diagnosis, risks and benefits of  proposed treatment above vs. alternative treatments vs. no treatment, as well as serious and common side effects of these treatments, and the inherent unpredictability of individual responses to these treatments. The patient expresses understanding of the above and displays the capacity to agree with this current plan. Patient also agrees that, currently, the benefits outweigh the risks and would like to pursue treatment at this time, and had no other questions.    Instructions:  Take all medications as prescribed.    Abstain from recreational drugs and alcohol.  Present to ED or call 911 for SI/HI plan or intent, psychosis, or medical emergency.    Return to Clinic: Follow up in about 2 months (around 11/26/2024).    Total time: Total time spent with patient 24 minutes.     Damian Ward MD  [facility]

## 2024-11-06 ENCOUNTER — OFFICE VISIT (OUTPATIENT)
Dept: FAMILY MEDICINE | Facility: CLINIC | Age: 29
End: 2024-11-06
Payer: MEDICAID

## 2024-11-06 VITALS
DIASTOLIC BLOOD PRESSURE: 77 MMHG | OXYGEN SATURATION: 98 % | HEART RATE: 75 BPM | SYSTOLIC BLOOD PRESSURE: 114 MMHG | HEIGHT: 65 IN | BODY MASS INDEX: 35.07 KG/M2 | TEMPERATURE: 98 F | RESPIRATION RATE: 20 BRPM | WEIGHT: 210.5 LBS

## 2024-11-06 DIAGNOSIS — Z00.00 WELLNESS EXAMINATION: ICD-10-CM

## 2024-11-06 DIAGNOSIS — Z11.3 SCREEN FOR STD (SEXUALLY TRANSMITTED DISEASE): ICD-10-CM

## 2024-11-06 DIAGNOSIS — Z00.00 WELL ADULT EXAM: Primary | ICD-10-CM

## 2024-11-06 PROCEDURE — 3074F SYST BP LT 130 MM HG: CPT | Mod: CPTII,,, | Performed by: NURSE PRACTITIONER

## 2024-11-06 PROCEDURE — 3008F BODY MASS INDEX DOCD: CPT | Mod: CPTII,,, | Performed by: NURSE PRACTITIONER

## 2024-11-06 PROCEDURE — 99214 OFFICE O/P EST MOD 30 MIN: CPT | Mod: PBBFAC,PN | Performed by: NURSE PRACTITIONER

## 2024-11-06 PROCEDURE — 99395 PREV VISIT EST AGE 18-39: CPT | Mod: S$PBB,,, | Performed by: NURSE PRACTITIONER

## 2024-11-06 PROCEDURE — 1160F RVW MEDS BY RX/DR IN RCRD: CPT | Mod: CPTII,,, | Performed by: NURSE PRACTITIONER

## 2024-11-06 PROCEDURE — 1159F MED LIST DOCD IN RCRD: CPT | Mod: CPTII,,, | Performed by: NURSE PRACTITIONER

## 2024-11-06 PROCEDURE — 3078F DIAST BP <80 MM HG: CPT | Mod: CPTII,,, | Performed by: NURSE PRACTITIONER

## 2024-11-06 NOTE — PROGRESS NOTES
[facility]  Thiago Cedeno DNP     Patient ID: [id number]     Chief Complaint: Follow-up (Patient requesting appointment. Requesting annual appointment. )      HPI:     Salma Montero is a 29 y.o. female here today for wellness visit.      Health Maintenance         Date Due Completion Date    COVID-19 Vaccine (3 - 2024-25 season) 09/01/2024 6/2/2021    Pap Smear 07/29/2027 7/29/2024    TETANUS VACCINE 03/15/2034 3/15/2024    RSV Vaccine (Age 60+ and Pregnant patients) (1 - 1-dose 75+ series) 04/25/2070 ---            Past Medical History:   Diagnosis Date    Dizziness     Endometriosis of uterus     GERD (gastroesophageal reflux disease)     Headache     Obesity     Palpitations     PTSD (post-traumatic stress disorder)     BiPolar disorder   dx. 2022        Past Surgical History:   Procedure Laterality Date    ADENOIDECTOMY      TONSILLECTOMY      TONSILLECTOMY AND ADENOIDECTOMY      UPPER GASTROINTESTINAL ENDOSCOPY          Social History     Tobacco Use    Smoking status: Every Day     Types: Vaping with nicotine    Smokeless tobacco: Never   Substance and Sexual Activity    Alcohol use: Not Currently     Alcohol/week: 2.0 standard drinks of alcohol     Types: 2 Shots of liquor per week     Comment: couple times a month    Drug use: Not Currently     Frequency: 21.0 times per week     Types: Marijuana, Nitrous oxide    Sexual activity: Yes     Partners: Male     Birth control/protection: None        Current Outpatient Medications   Medication Instructions    busPIRone (BUSPAR) 15 mg, Oral, 3 times daily    cholecalciferol (vitamin D3) (VITAMIN D3) 50 mcg, Daily    lamoTRIgine (LAMICTAL) 150 mg, Oral, 2 times daily    metoprolol succinate (TOPROL-XL) 50 mg, Oral, Nightly    ondansetron (ZOFRAN-ODT) 4 mg, Oral, Every 6 hours PRN    pantoprazole (PROTONIX) 40 mg, Oral, 2 times daily    traZODone (DESYREL) 50 MG tablet Take 50-100mg (1-2 tablets) by mouth nightly as needed for insomnia.     "vortioxetine (TRINTELLIX) 20 mg, Oral, Daily       Review of patient's allergies indicates:   Allergen Reactions    Corn meal-luffa cylindrica frt Anaphylaxis, Hives, Itching and Rash    Orange Hives and Itching    Sulfa (sulfonamide antibiotics) Rash, Hives and Itching        Patient Care Team:  Desiree Kiser, NP as PCP - General (Family Medicine)  Brittanie Lowe LPN as Care Coordinator  Rico Hay MD as OB/GYN (Obstetrics and Gynecology)     Subjective:     Review of Systems   Constitutional:  Negative for appetite change, chills, diaphoresis and fever.   HENT:  Negative for ear pain, sinus pain and sore throat.    Eyes:  Negative for pain and visual disturbance.   Respiratory:  Negative for cough, shortness of breath and wheezing.    Cardiovascular:  Negative for chest pain, palpitations and leg swelling.   Gastrointestinal:  Negative for abdominal pain, blood in stool, diarrhea, nausea and vomiting.   Endocrine: Negative for cold intolerance.   Genitourinary:  Negative for difficulty urinating, dysuria, frequency and hematuria.   Musculoskeletal:  Negative for arthralgias, joint swelling and myalgias.   Skin:  Negative for color change and rash.   Allergic/Immunologic: Negative.    Neurological: Negative.  Negative for dizziness, syncope, light-headedness and numbness.   Hematological: Negative.    Psychiatric/Behavioral: Negative.  Negative for dysphoric mood and suicidal ideas. The patient is not nervous/anxious.    All other systems reviewed and are negative.      12 point review of systems conducted, negative except as stated in the history of present illness. See HPI for details.    Objective:     Visit Vitals  /77 (BP Location: Right arm, Patient Position: Sitting)   Pulse 75   Temp 98.2 °F (36.8 °C) (Oral)   Resp 20   Ht 5' 5" (1.651 m)   Wt 95.5 kg (210 lb 8 oz)   LMP 10/17/2024   SpO2 98%   Breastfeeding No   BMI 35.03 kg/m²       Physical Exam  Vitals and nursing note " reviewed.   Constitutional:       General: She is not in acute distress.     Appearance: She is not ill-appearing.   HENT:      Head: Normocephalic and atraumatic.      Mouth/Throat:      Mouth: Mucous membranes are moist.      Pharynx: Oropharynx is clear.   Eyes:      General: No scleral icterus.     Extraocular Movements: Extraocular movements intact.      Conjunctiva/sclera: Conjunctivae normal.      Pupils: Pupils are equal, round, and reactive to light.   Neck:      Vascular: No carotid bruit.   Cardiovascular:      Rate and Rhythm: Normal rate and regular rhythm.      Heart sounds: No murmur heard.     No friction rub. No gallop.   Pulmonary:      Effort: Pulmonary effort is normal. No respiratory distress.      Breath sounds: Normal breath sounds. No wheezing, rhonchi or rales.   Abdominal:      General: Abdomen is flat. Bowel sounds are normal. There is no distension.      Palpations: Abdomen is soft. There is no mass.      Tenderness: There is no abdominal tenderness.   Musculoskeletal:         General: Normal range of motion.      Cervical back: Normal range of motion and neck supple.   Skin:     General: Skin is warm and dry.   Neurological:      General: No focal deficit present.      Mental Status: She is alert.   Psychiatric:         Mood and Affect: Mood normal.         Labs Reviewed:     Chemistry:  Lab Results   Component Value Date     06/25/2024    K 3.7 06/25/2024    BUN 9.4 06/25/2024    CREATININE 0.82 06/25/2024    EGFRNORACEVR >60 06/25/2024    GLUCOSE 99 06/25/2024    CALCIUM 8.9 06/25/2024    ALKPHOS 108 06/25/2024    LABPROT 6.9 06/25/2024    ALBUMIN 3.2 (L) 06/25/2024    BILIDIR 0.1 10/20/2021    IBILI 0.10 10/20/2021    AST 39 (H) 06/25/2024    ALT 31 06/25/2024    MG 1.80 08/09/2023    RGKCVVRH14XP 58.2 08/14/2023    TSH 2.145 08/14/2023    EQSRJX6RCQH 1.33 08/14/2023        Lab Results   Component Value Date    HGBA1C 5.0 08/14/2023        Hematology:  Lab Results   Component  Value Date    WBC 11.63 (H) 06/25/2024    HGB 11.2 (L) 06/25/2024    HCT 33.6 (L) 06/25/2024     06/25/2024       Lipid Panel:  Lab Results   Component Value Date    CHOL 159 08/14/2023    HDL 26 (L) 08/14/2023    LDL 90.00 08/14/2023    TRIG 215 (H) 08/14/2023    TOTALCHOLEST 6 (H) 08/14/2023        Urine:  Lab Results   Component Value Date    APPEARANCEUA Hazy (A) 11/02/2023    SGUA 1.025 11/02/2023    PROTEINUA 1+ (A) 11/02/2023    KETONESUA 3+ (A) 11/02/2023    LEUKOCYTESUR 75 (A) 11/02/2023    RBCUA 0-5 11/02/2023    WBCUA 0-5 11/02/2023    BACTERIA Rare 11/02/2023    SQEPUA Occ (A) 09/14/2023    HYALINECASTS None Seen 11/02/2023        Assessment:       ICD-10-CM ICD-9-CM   1. Well adult exam  Z00.00 V70.0   2. Screen for STD (sexually transmitted disease)  Z11.3 V74.5   3. Wellness examination  Z00.00 V70.0        Plan:     1. Well adult exam  Assessment & Plan:  Counseled on importance of routine annual wellness exam and preventative health screenings at appropriate age intervals.    Counseled for healthy weight maintenance, active lifestyle, low carb, low-fat, low-cholesterol well-balanced diet as well as maintaining 20-30 minutes of aerobic activity daily.    Discussed importance of getting plenty of sleep nightly, drinking plenty of water daily, stress reduction, and maintenance of healthy social activities.    Reviewed history today.  Patient will continue follow-up with behavioral health/psychiatry and going to cognitive behavioral therapy.  She reports depression has been well-controlled and she denies any suicidal or homicidal ideations.  She is establish with gyn as well.    Orders:  -     CBC Auto Differential; Future; Expected date: 11/06/2024  -     Comprehensive Metabolic Panel; Future; Expected date: 11/06/2024  -     Lipid Panel; Future; Expected date: 11/06/2024  -     TSH; Future; Expected date: 11/06/2024  -     Hemoglobin A1C; Future; Expected date: 11/06/2024  -     Urinalysis;  Future; Expected date: 11/06/2024  -     T4, Free; Future; Expected date: 11/06/2024  -     Vitamin D; Future; Expected date: 11/06/2024    2. Screen for STD (sexually transmitted disease)  Assessment & Plan:  Counseled on importance of routine annual wellness exam and preventative health screenings at appropriate age intervals.    Counseled for healthy weight maintenance, active lifestyle, low carb, low-fat, low-cholesterol well-balanced diet as well as maintaining 20-30 minutes of aerobic activity daily.    Discussed importance of getting plenty of sleep nightly, drinking plenty of water daily, stress reduction, and maintenance of healthy social activities.    Reviewed history today.  Patient will continue follow-up with behavioral health/psychiatry and going to cognitive behavioral therapy.  She reports depression has been well-controlled and she denies any suicidal or homicidal ideations.  She is establish with gyn as well.    Orders:  -     Hepatitis C Antibody; Future; Expected date: 11/06/2024  -     HIV 1/2 Ag/Ab (4th Gen); Future; Expected date: 11/06/2024    3. Wellness examination  Assessment & Plan:  Counseled on importance of routine annual wellness exam and preventative health screenings at appropriate age intervals.    Counseled for healthy weight maintenance, active lifestyle, low carb, low-fat, low-cholesterol well-balanced diet as well as maintaining 20-30 minutes of aerobic activity daily.    Discussed importance of getting plenty of sleep nightly, drinking plenty of water daily, stress reduction, and maintenance of healthy social activities.    Reviewed history today.  Patient will continue follow-up with behavioral health/psychiatry and going to cognitive behavioral therapy.  She reports depression has been well-controlled and she denies any suicidal or homicidal ideations.  She is establish with gyn as well.           No follow-ups on file. In addition to their scheduled follow up, the patient  has also been instructed to follow up on as needed basis.     Future Appointments   Date Time Provider Department Center   12[facility]6/2024 11:30 [facility]   7/31/2025 10:00 AM Rico Hay MD [facility]   11/6/2025  1:00 PM [facility]        CATALINA Wagner

## 2024-11-06 NOTE — ASSESSMENT & PLAN NOTE
Counseled on importance of routine annual wellness exam and preventative health screenings at appropriate age intervals.    Counseled for healthy weight maintenance, active lifestyle, low carb, low-fat, low-cholesterol well-balanced diet as well as maintaining 20-30 minutes of aerobic activity daily.    Discussed importance of getting plenty of sleep nightly, drinking plenty of water daily, stress reduction, and maintenance of healthy social activities.    Reviewed history today.  Patient will continue follow-up with behavioral health/psychiatry and going to cognitive behavioral therapy.  She reports depression has been well-controlled and she denies any suicidal or homicidal ideations.  She is establish with gyn as well.

## 2024-12-06 ENCOUNTER — OFFICE VISIT (OUTPATIENT)
Dept: BEHAVIORAL HEALTH | Facility: CLINIC | Age: 29
End: 2024-12-06
Payer: MEDICAID

## 2024-12-06 DIAGNOSIS — G43.009 MIGRAINE WITHOUT AURA AND WITHOUT STATUS MIGRAINOSUS, NOT INTRACTABLE: ICD-10-CM

## 2024-12-06 DIAGNOSIS — F33.1 MODERATE EPISODE OF RECURRENT MAJOR DEPRESSIVE DISORDER: Chronic | ICD-10-CM

## 2024-12-06 DIAGNOSIS — G47.00 INSOMNIA, UNSPECIFIED TYPE: ICD-10-CM

## 2024-12-06 DIAGNOSIS — F41.1 GAD (GENERALIZED ANXIETY DISORDER): Primary | ICD-10-CM

## 2024-12-06 DIAGNOSIS — G44.229 CHRONIC TENSION-TYPE HEADACHE, NOT INTRACTABLE: ICD-10-CM

## 2024-12-06 DIAGNOSIS — F43.10 PTSD (POST-TRAUMATIC STRESS DISORDER): Chronic | ICD-10-CM

## 2024-12-06 PROCEDURE — 99213 OFFICE O/P EST LOW 20 MIN: CPT | Mod: PBBFAC,PN | Performed by: STUDENT IN AN ORGANIZED HEALTH CARE EDUCATION/TRAINING PROGRAM

## 2024-12-06 RX ORDER — CLONAZEPAM 0.5 MG/1
0.5 TABLET ORAL DAILY PRN
Qty: 30 TABLET | Refills: 0 | Status: SHIPPED | OUTPATIENT
Start: 2024-12-06 | End: 2025-12-06

## 2024-12-06 RX ORDER — LAMOTRIGINE 150 MG/1
150 TABLET ORAL 2 TIMES DAILY
Qty: 60 TABLET | Refills: 5 | Status: SHIPPED | OUTPATIENT
Start: 2024-12-06 | End: 2025-04-05

## 2024-12-06 RX ORDER — TRAZODONE HYDROCHLORIDE 50 MG/1
TABLET ORAL
Qty: 60 TABLET | Refills: 5 | Status: SHIPPED | OUTPATIENT
Start: 2024-12-06

## 2024-12-06 RX ORDER — BUSPIRONE HYDROCHLORIDE 15 MG/1
15 TABLET ORAL 3 TIMES DAILY
Qty: 90 TABLET | Refills: 5 | Status: SHIPPED | OUTPATIENT
Start: 2024-12-06
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
## 2024-12-17 ENCOUNTER — PATIENT MESSAGE (OUTPATIENT)
Dept: BEHAVIORAL HEALTH | Facility: CLINIC | Age: 29
End: 2024-12-17
Payer: MEDICAID

## 2024-12-22 DIAGNOSIS — F41.1 GAD (GENERALIZED ANXIETY DISORDER): Chronic | ICD-10-CM

## 2024-12-23 RX ORDER — METOPROLOL SUCCINATE 50 MG/1
50 TABLET, EXTENDED RELEASE ORAL NIGHTLY
Qty: 90 TABLET | Refills: 0 | Status: SHIPPED | OUTPATIENT
Start: 2024-12-23

## 2025-01-10 ENCOUNTER — OFFICE VISIT (OUTPATIENT)
Dept: BEHAVIORAL HEALTH | Facility: CLINIC | Age: 30
End: 2025-01-10
Payer: MEDICAID

## 2025-01-10 VITALS
BODY MASS INDEX: 34.31 KG/M2 | WEIGHT: 206.19 LBS | DIASTOLIC BLOOD PRESSURE: 86 MMHG | TEMPERATURE: 98 F | OXYGEN SATURATION: 100 % | SYSTOLIC BLOOD PRESSURE: 120 MMHG | HEART RATE: 73 BPM

## 2025-01-10 DIAGNOSIS — F41.1 GAD (GENERALIZED ANXIETY DISORDER): Chronic | ICD-10-CM

## 2025-01-10 DIAGNOSIS — G43.009 MIGRAINE WITHOUT AURA AND WITHOUT STATUS MIGRAINOSUS, NOT INTRACTABLE: ICD-10-CM

## 2025-01-10 DIAGNOSIS — F33.1 MODERATE EPISODE OF RECURRENT MAJOR DEPRESSIVE DISORDER: Primary | Chronic | ICD-10-CM

## 2025-01-10 DIAGNOSIS — G44.229 CHRONIC TENSION-TYPE HEADACHE, NOT INTRACTABLE: ICD-10-CM

## 2025-01-10 DIAGNOSIS — G47.00 INSOMNIA, UNSPECIFIED TYPE: ICD-10-CM

## 2025-01-10 DIAGNOSIS — F43.10 PTSD (POST-TRAUMATIC STRESS DISORDER): Chronic | ICD-10-CM

## 2025-01-10 PROCEDURE — 99213 OFFICE O/P EST LOW 20 MIN: CPT | Mod: PBBFAC,PN | Performed by: STUDENT IN AN ORGANIZED HEALTH CARE EDUCATION/TRAINING PROGRAM

## 2025-01-10 RX ORDER — BUSPIRONE HYDROCHLORIDE 15 MG/1
15 TABLET ORAL 3 TIMES DAILY
Qty: 90 TABLET | Refills: 5 | Status: SHIPPED | OUTPATIENT
Start: 2025-01-10

## 2025-01-10 RX ORDER — LAMOTRIGINE 150 MG/1
150 TABLET ORAL 2 TIMES DAILY
Qty: 60 TABLET | Refills: 5 | Status: SHIPPED | OUTPATIENT
Start: 2025-01-10 | End: 2025-05-10

## 2025-01-10 RX ORDER — SUMATRIPTAN SUCCINATE 100 MG/1
100 TABLET ORAL 2 TIMES DAILY PRN
COMMUNITY
Start: 2024-12-04

## 2025-01-10 RX ORDER — TRAZODONE HYDROCHLORIDE 50 MG/1
TABLET ORAL
Qty: 60 TABLET | Refills: 5 | Status: SHIPPED | OUTPATIENT
Start: 2025-01-10
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
## 2025-03-28 ENCOUNTER — OFFICE VISIT (OUTPATIENT)
Dept: BEHAVIORAL HEALTH | Facility: CLINIC | Age: 30
End: 2025-03-28
Payer: MEDICAID

## 2025-03-28 ENCOUNTER — PATIENT MESSAGE (OUTPATIENT)
Dept: BEHAVIORAL HEALTH | Facility: CLINIC | Age: 30
End: 2025-03-28
Payer: MEDICAID

## 2025-03-28 VITALS
OXYGEN SATURATION: 100 % | DIASTOLIC BLOOD PRESSURE: 88 MMHG | WEIGHT: 193 LBS | SYSTOLIC BLOOD PRESSURE: 129 MMHG | HEART RATE: 69 BPM | BODY MASS INDEX: 32.12 KG/M2

## 2025-03-28 DIAGNOSIS — F43.10 PTSD (POST-TRAUMATIC STRESS DISORDER): Chronic | ICD-10-CM

## 2025-03-28 DIAGNOSIS — F33.1 MODERATE EPISODE OF RECURRENT MAJOR DEPRESSIVE DISORDER: Primary | Chronic | ICD-10-CM

## 2025-03-28 DIAGNOSIS — G43.009 MIGRAINE WITHOUT AURA AND WITHOUT STATUS MIGRAINOSUS, NOT INTRACTABLE: ICD-10-CM

## 2025-03-28 DIAGNOSIS — G44.229 CHRONIC TENSION-TYPE HEADACHE, NOT INTRACTABLE: ICD-10-CM

## 2025-03-28 DIAGNOSIS — G47.00 INSOMNIA, UNSPECIFIED TYPE: ICD-10-CM

## 2025-03-28 DIAGNOSIS — F41.1 GAD (GENERALIZED ANXIETY DISORDER): Chronic | ICD-10-CM

## 2025-03-28 PROCEDURE — 99213 OFFICE O/P EST LOW 20 MIN: CPT | Mod: PBBFAC,PN | Performed by: STUDENT IN AN ORGANIZED HEALTH CARE EDUCATION/TRAINING PROGRAM

## 2025-03-28 RX ORDER — LAMOTRIGINE 150 MG/1
150 TABLET ORAL 2 TIMES DAILY
Qty: 180 TABLET | Refills: 3 | Status: SHIPPED | OUTPATIENT
Start: 2025-03-28 | End: 2025-07-26

## 2025-03-28 RX ORDER — TRAZODONE HYDROCHLORIDE 50 MG/1
TABLET ORAL
Qty: 180 TABLET | Refills: 3 | Status: SHIPPED | OUTPATIENT
Start: 2025-03-28

## 2025-03-28 RX ORDER — BUSPIRONE HYDROCHLORIDE 15 MG/1
15 TABLET ORAL 3 TIMES DAILY
Qty: 90 TABLET | Refills: 5 | Status: SHIPPED | OUTPATIENT
Start: 2025-03-28
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
## 2025-05-02 DIAGNOSIS — F41.1 GAD (GENERALIZED ANXIETY DISORDER): Chronic | ICD-10-CM

## 2025-05-02 RX ORDER — BUSPIRONE HYDROCHLORIDE 15 MG/1
15 TABLET ORAL 3 TIMES DAILY
Qty: 90 TABLET | Refills: 5 | Status: SHIPPED | OUTPATIENT
Start: 2025-05-02